# Patient Record
Sex: FEMALE | Race: WHITE | NOT HISPANIC OR LATINO | Employment: OTHER | URBAN - METROPOLITAN AREA
[De-identification: names, ages, dates, MRNs, and addresses within clinical notes are randomized per-mention and may not be internally consistent; named-entity substitution may affect disease eponyms.]

---

## 2017-06-15 ENCOUNTER — OFFICE VISIT (OUTPATIENT)
Dept: PODIATRY | Facility: CLINIC | Age: 82
End: 2017-06-15
Payer: COMMERCIAL

## 2017-06-15 VITALS
SYSTOLIC BLOOD PRESSURE: 138 MMHG | HEART RATE: 74 BPM | BODY MASS INDEX: 26.57 KG/M2 | WEIGHT: 150 LBS | DIASTOLIC BLOOD PRESSURE: 66 MMHG

## 2017-06-15 DIAGNOSIS — E11.51 TYPE II DIABETES MELLITUS WITH PERIPHERAL ARTERY DISEASE (H): Primary | ICD-10-CM

## 2017-06-15 DIAGNOSIS — B35.1 DERMATOPHYTOSIS OF NAIL: ICD-10-CM

## 2017-06-15 DIAGNOSIS — L84 CORNS AND CALLOSITIES: ICD-10-CM

## 2017-06-15 PROCEDURE — 11057 PARNG/CUTG B9 HYPRKR LES >4: CPT | Mod: Q8 | Performed by: PODIATRIST

## 2017-06-15 PROCEDURE — 11721 DEBRIDE NAIL 6 OR MORE: CPT | Mod: 59 | Performed by: PODIATRIST

## 2017-06-15 RX ORDER — MECLIZINE HYDROCHLORIDE 25 MG/1
25 TABLET ORAL
COMMUNITY
Start: 2017-01-09 | End: 2020-01-09

## 2017-06-15 RX ORDER — DIPHENOXYLATE HCL/ATROPINE 2.5-.025MG
TABLET ORAL
COMMUNITY
Start: 2017-01-09 | End: 2024-03-30

## 2017-06-15 RX ORDER — ATORVASTATIN CALCIUM 40 MG/1
TABLET, FILM COATED ORAL
COMMUNITY
Start: 2016-12-16 | End: 2020-01-09

## 2017-06-15 RX ORDER — ASPIRIN 81 MG/1
81 TABLET ORAL
COMMUNITY
Start: 2016-03-03 | End: 2020-01-09

## 2017-06-15 RX ORDER — GABAPENTIN 600 MG/1
TABLET ORAL
COMMUNITY
Start: 2017-03-16 | End: 2024-03-30

## 2017-06-15 RX ORDER — LEVOTHYROXINE SODIUM 100 UG/1
100 TABLET ORAL DAILY
COMMUNITY

## 2017-06-15 NOTE — PATIENT INSTRUCTIONS
We wish you continued good healing. If you have any questions or concerns, please do not hesitate to contact us at [phone]      Please remember to call and schedule a follow up appointment if one was recommended at your earliest convenience.   PODIATRY CLINIC HOURS  TELEPHONE NUMBER    Dr. Willie Plascencia D.P.M [facility]    Clinics:  [facility]        Tosha Leigh MA  Medical Assistant  Tuesday 1PM-6PM  [facility]  Wednesday 7AM-2PM  [place]  Thursday 10AM-6PM  [place] Friday 7AM-345PM  [place]  Specialty schedulers:   [phone] to make an appointment with any Specialty Provider.        Urgent Care locations:    [facility] Monday-Friday 5 pm - 9 pm. Saturday-Sunday 9 am -5pm    Monday-Friday 11 am - 9 pm Saturday 9 am - 5 pm     Monday-Sunday 12 noon-8PM [phone]     If you need a medication refill, please contact us you may need lab work and/or a follow up visit prior to your refill (i.e. Antifungal medications).    [employer] (secure e-mail communication and access to your chart) to send a message or to make an appointment.    If MRI needed please call [facility] at [phone]        Weight management plan: Patient was referred to their PCP to discuss a diet and exercise plan.

## 2017-06-15 NOTE — MR AVS SNAPSHOT
After Visit Summary   6/15/2017    Mariela Ortiz    MRN: [id number]           Patient Information     Date Of Birth          5/25/1933        Visit Information        Provider Department      6/15/2017 4:15 PM Willie Plascencia, DPM [facility]        Today's Diagnoses     Type II diabetes mellitus with peripheral artery disease (H)    -  1    Dermatophytosis of nail        Corns and callosities          Care Instructions    We wish you continued good healing. If you have any questions or concerns, please do not hesitate to contact us at [phone]      Please remember to call and schedule a follow up appointment if one was recommended at your earliest convenience.   PODIATRY CLINIC HOURS  TELEPHONE NUMBER    Dr. Willie Plascencia DJillianPCHETAN FAC FAS    Clinics:  [facility]        Tosha Leigh MA  Medical Assistant  Tuesday 1PM-6PM  [place]  Wednesday 7AM-2PM  [place]  Thursday 10AM-6PM  [place]  Friday 7AM-345PM  [place]  Specialty schedulers:   [phone] to make an appointment with any Specialty Provider.        Urgent Care locations:    [place] Monday-Friday 5 pm - 9 pm. Saturday-Sunday 9 am -5pm    Monday-Friday 11 am - 9 pm Saturday 9 am - 5 pm     Monday-Sunday 12 noon-8PM [phone]     If you need a medication refill, please contact us you may need lab work and/or a follow up visit prior to your refill (i.e. Antifungal medications).    [email or web address] (secure e-mail communication and access to your chart) to send a message or to make an appointment.    If MRI needed please call [facility] Re[facility] at [phone]        Weight management plan: Patient was referred to their PCP to discuss a diet and exercise plan.            Follow-ups after your visit        Who to contact     If you have questions or need follow up information about  "today's clinic visit or your schedule please contact [facility] directly at [phone].  Normal or non-critical lab and imaging results will be communicated to you by MyChart, letter or phone within 4 business days after the clinic has received the results. If you do not hear from us within 7 days, please contact the clinic through [employer] or phone. If you have a critical or abnormal lab result, we will notify you by phone as soon as possible.  Submit refill requests through [employer] or call your pharmacy and they will forward the refill request to us. Please allow 3 business days for your refill to be completed.          Additional Information About Your Visit        [employer] Information     [employer] lets you send messages to your doctor, view your test results, renew your prescriptions, schedule appointments and more. To sign up, go to [email or web address] . Click on \"Log in\" on the left side of the screen, which will take you to the Welcome page. Then click on \"Sign up Now\" on the right side of the page.     You will be asked to enter the access code listed below, as well as some personal information. Please follow the directions to create your username and password.     Your access code is: [email or web address]  Expires: 2017  4:42 PM     Your access code will  in 90 days. If you need help or a new code, please call your [place] clinic or [phone].        Care EveryWhere ID     This is your Care EveryWhere ID. This could be used by other organizations to access your [place] medical records  [id number]        Your Vitals Were     Pulse BMI (Body Mass Index)                74 26.57 kg/m2           Blood Pressure from Last 3 Encounters:   06/15/17 138/66   12/14/15 142/64   03/11/15 134/70    Weight from Last 3 Encounters:   06/15/17 68 kg (150 lb)   12/14/15 71.2 kg (157 lb)   04/01/15 73 kg (161 lb)              We Performed the Following     DEBRIDEMENT OF NAILS, 6 OR MORE     TRIM " HYPERKERATOTIC SKIN LESION,>4          Today's Medication Changes          These changes are accurate as of: 6/15/17  4:42 PM.  If you have any questions, ask your nurse or doctor.               These medicines have changed or have updated prescriptions.        Dose/Directions    aspirin EC 81 MG EC tablet   This may have changed:  Another medication with the same name was removed. Continue taking this medication, and follow the directions you see here.   Changed by:  Willie Plascencia DPM        Dose:  81 mg   Take 81 mg by mouth   Refills:  0                Primary Care Provider Office Phone # Fax #    Tiffany Prater [phone]       [address] [address]        Thank you!     Thank you for choosing [facility]  for your care. Our goal is always to provide you with excellent care. Hearing back from our patients is one way we can continue to improve our services. Please take a few minutes to complete the written survey that you may receive in the mail after your visit with us. Thank you!             Your Updated Medication List - Protect others around you: Learn how to safely use, store and throw away your medicines at www.disposemymeds.org.          This list is accurate as of: 6/15/17  4:42 PM.  Always use your most recent med list.                   Brand Name Dispense Instructions for use    ACE NOT PRESCRIBED (INTENTIONAL)     0 each    1 each continuous prn. ACE Inhibitor not prescribed due to Other:not needed right now       acetaminophen 500 MG tablet    TYLENOL     Take 500-1,000 mg by mouth Take 2 tablets as needed for pain       amoxicillin 500 MG capsule    AMOXIL     Take 500 mg by mouth 4 tablets 1 hour before dental.       aspirin EC 81 MG EC tablet      Take 81 mg by mouth       atorvastatin 40 MG tablet    LIPITOR     TAKE ONE TABLET BY MOUTH EVERY DAY       CALCIUM 600 + D PO      Take 2 tablets by mouth daily.        D-400 400 UNITS Tabs      Once daily.       diphenoxylate-atropine 2.5-0.025 MG per tablet    LOMOTIL         gabapentin 600 MG tablet    NEURONTIN     TAKE 1 TABLET BY MOUTH THREE TIMES A DAY       levothyroxine 150 MCG tablet    SYNTHROID/LEVOTHROID     TAKE ONE TABLET BY MOUTH EVERY DAY BEFORE MORNING MEAL       meclizine 25 MG tablet    ANTIVERT     Take 25 mg by mouth       pantoprazole sodium 40 MG tablet      Take 1 packet by mouth daily

## 2017-06-15 NOTE — PROGRESS NOTES
Subjective:    Pt is seen today  for a diabetic foot exam.  Patient is having a hard time trimming his nails and points to all of them.  This has been getting more difficult over the years.  Sometimes they are painful.  The pain is aggravated by activity and relieved by rest.  Patient also has calluses which are painful.  Describes it as a burning pain which is aggravated by activity and relieved by rest.  Primary care is Ana Lilia Hurt last seen 2/2/17.      ROS:  No hx of wound healing problems, denies numbness, erythema, or fever and chills.  Denies foot ulcers.     No Known Allergies    Current Outpatient Prescriptions   Medication Sig Dispense Refill     gabapentin (NEURONTIN) 600 MG tablet Take 1 tablet (600 mg) by mouth 3 times daily 270 tablet 3     atorvastatin (LIPITOR) 40 MG tablet Take 1 tablet (40 mg) by mouth daily 90 tablet 0     pantoprazole sodium 40 MG tablet Take 1 packet by mouth daily       acetaminophen (TYLENOL) 500 MG tablet Take 500-1,000 mg by mouth Take 2 tablets as needed for pain       levothyroxine (LEVOTHROID) 150 MCG tablet Take 1 tablet (150 mcg) by mouth daily 90 tablet 3     ACE NOT PRESCRIBED, INTENTIONAL, 1 each continuous prn. ACE Inhibitor not prescribed due to Other:not needed right now 0 each 0     Cholecalciferol (D-400) 400 UNITS TABS Once daily.        amoxicillin (AMOXIL) 500 MG capsule Take 500 mg by mouth 4 tablets 1 hour before dental.       aspirin 325 MG tablet Take 325 mg by mouth daily.       Calcium Carbonate-Vitamin D (CALCIUM 600 + D OR) Take 2 tablets by mouth daily.         Patient Active Problem List   Diagnosis     Advanced directives, counseling/discussion     GERD (gastroesophageal reflux disease)     Chronic lymphocytic leukemia (H)     Spinal stenosis     Type 2 diabetes mellitus without complication (H)     Hyperlipidemia LDL goal <100     Right knee pain     Low back pain     AC (acromioclavicular) arthritis     Arthritis of shoulder     Hypothyroidism  due to acquired atrophy of thyroid       Past Medical History:   Diagnosis Date     Melanoma (H)        Past Surgical History:   Procedure Laterality Date     APPENDECTOMY       CHOLECYSTECTOMY, LAPOROSCOPIC      Cholecystectomy, Laparoscopic     HYSTERECTOMY, PAP NO LONGER INDICATED  1981    menorrhagia     JOINT REPLACEMENT, HIP RT/LT  fall 2002    right     JOINT REPLACEMTN, KNEE RT/LT  2002    Joint Replacement knee RT/LT       Family History   Problem Relation Age of Onset     DIABETES Daughter        Social History   Substance Use Topics     Smoking status: Former Smoker     Start date: 4/1/2002     Smokeless tobacco: Never Used     Alcohol use Yes      Comment: 2 beers a week         Exam:    There were no vitals taken for this visit..  Patient a good historian.  A&O X 3.  Pulses DP 1/4, PT 0/4 b/l.  CRT < 3 seconds X 10 digits.  Bilateral edema or varicosities noted.  5.07 monofilament  intact b/l.  Reflexes 2/4 b/l.  Skin thin, shiny, and atrophic with no hair growth noted b/l.  Normal arch with weightbearing.  No forefoot or rear foot deformities noted.  MS 5/5 all compartments.  Normal ROM all fore foot and rearfoot joints.  All nails are thickened, elongated, discolored with subungual debris.  Patient has calluses on right foot sub fifth met head and right  plantar toes 1,2,4 and left hallus plantar.  No masses or breakdown in the skin bilaterally.  No erythema or ecchymosis noted bilateral.     A/P  Diabetes mellitus with PAD  Onychomycosis  Calluses x 5    Mycotic nails manually debrided with a .  Calluses debrided with a fifteen blade.    Gave patient instructions on daily foot examination and wearing good shoes at all times. Will RETURN TO CLINIC ASAP if notices any problems.  Discussed with patient that I do not do this in my practice unless patient at significant risk of limb loss.  Will refer to foot care service/nurse.          Willie Plascencia DPM, FACFAS

## 2017-06-15 NOTE — LETTER
6/15/2017       RE: Mariela Ortiz  [address]  [facility] [address]           Dear Colleague,    Thank you for referring your patient, Mariela Ortiz, to the [facility]. Please see a copy of my visit note below.    Subjective:    Pt is seen today  for a diabetic foot exam.  Patient is having a hard time trimming his nails and points to all of them.  This has been getting more difficult over the years.  Sometimes they are painful.  The pain is aggravated by activity and relieved by rest.  Patient also has calluses which are painful.  Describes it as a burning pain which is aggravated by activity and relieved by rest.  Primary care is Ana Lilia Hurt last seen 2/2/17.      ROS:  No hx of wound healing problems, denies numbness, erythema, or fever and chills.  Denies foot ulcers.     No Known Allergies    Current Outpatient Prescriptions   Medication Sig Dispense Refill     gabapentin (NEURONTIN) 600 MG tablet Take 1 tablet (600 mg) by mouth 3 times daily 270 tablet 3     atorvastatin (LIPITOR) 40 MG tablet Take 1 tablet (40 mg) by mouth daily 90 tablet 0     pantoprazole sodium 40 MG tablet Take 1 packet by mouth daily       acetaminophen (TYLENOL) 500 MG tablet Take 500-1,000 mg by mouth Take 2 tablets as needed for pain       levothyroxine (LEVOTHROID) 150 MCG tablet Take 1 tablet (150 mcg) by mouth daily 90 tablet 3     ACE NOT PRESCRIBED, INTENTIONAL, 1 each continuous prn. ACE Inhibitor not prescribed due to Other:not needed right now 0 each 0     Cholecalciferol (D-400) 400 UNITS TABS Once daily.        amoxicillin (AMOXIL) 500 MG capsule Take 500 mg by mouth 4 tablets 1 hour before dental.       aspirin 325 MG tablet Take 325 mg by mouth daily.       Calcium Carbonate-Vitamin D (CALCIUM 600 + D OR) Take 2 tablets by mouth daily.         Patient Active Problem List   Diagnosis     Advanced directives, counseling/discussion     GERD (gastroesophageal reflux disease)     Chronic  lymphocytic leukemia (H)     Spinal stenosis     Type 2 diabetes mellitus without complication (H)     Hyperlipidemia LDL goal <100     Right knee pain     Low back pain     AC (acromioclavicular) arthritis     Arthritis of shoulder     Hypothyroidism due to acquired atrophy of thyroid       Past Medical History:   Diagnosis Date     Melanoma (H)        Past Surgical History:   Procedure Laterality Date     APPENDECTOMY       CHOLECYSTECTOMY, LAPOROSCOPIC      Cholecystectomy, Laparoscopic     HYSTERECTOMY, PAP NO LONGER INDICATED  1981    menorrhagia     JOINT REPLACEMENT, HIP RT/LT  fall 2002    right     JOINT REPLACEMTN, KNEE RT/LT  2002    Joint Replacement knee RT/LT       Family History   Problem Relation Age of Onset     DIABETES Daughter        Social History   Substance Use Topics     Smoking status: Former Smoker     Start date: 4/1/2002     Smokeless tobacco: Never Used     Alcohol use Yes      Comment: 2 beers a week         Exam:    There were no vitals taken for this visit..  Patient a good historian.  A&O X 3.  Pulses DP 1/4, PT 0/4 b/l.  CRT < 3 seconds X 10 digits.  Bilateral edema or varicosities noted.  5.07 monofilament  intact b/l.  Reflexes 2/4 b/l.  Skin thin, shiny, and atrophic with no hair growth noted b/l.  Normal arch with weightbearing.  No forefoot or rear foot deformities noted.  MS 5/5 all compartments.  Normal ROM all fore foot and rearfoot joints.  All nails are thickened, elongated, discolored with subungual debris.  Patient has calluses on right foot sub fifth met head and right  plantar toes 1,2,4 and left hallus plantar.  No masses or breakdown in the skin bilaterally.  No erythema or ecchymosis noted bilateral.     A/P  Diabetes mellitus with PAD  Onychomycosis  Calluses x 5    Mycotic nails manually debrided with a .  Calluses debrided with a fifteen blade.    Gave patient instructions on daily foot examination and wearing good shoes at all times. Will RETURN TO  CLINIC ASAP if notices any problems.  Discussed with patient that I do not do this in my practice unless patient at significant risk of limb loss.  Will refer to foot care service/nurse.          Willie Plascencia DPM, FACFAS           Again, thank you for allowing me to participate in the care of your patient.        Sincerely,              Willie Plascencia DPM

## 2020-01-09 ENCOUNTER — OFFICE VISIT (OUTPATIENT)
Dept: PODIATRY | Facility: CLINIC | Age: 85
End: 2020-01-09
Payer: COMMERCIAL

## 2020-01-09 VITALS
HEART RATE: 70 BPM | WEIGHT: 134 LBS | DIASTOLIC BLOOD PRESSURE: 64 MMHG | BODY MASS INDEX: 23.74 KG/M2 | SYSTOLIC BLOOD PRESSURE: 146 MMHG

## 2020-01-09 DIAGNOSIS — E11.51 TYPE II DIABETES MELLITUS WITH PERIPHERAL ARTERY DISEASE (H): Primary | ICD-10-CM

## 2020-01-09 DIAGNOSIS — B35.1 ONYCHOMYCOSIS: ICD-10-CM

## 2020-01-09 DIAGNOSIS — L84 CALLUS: ICD-10-CM

## 2020-01-09 PROBLEM — F03.90 DEMENTIA (H): Status: ACTIVE | Noted: 2017-07-21

## 2020-01-09 PROBLEM — R42 DISEQUILIBRIUM: Status: ACTIVE | Noted: 2019-03-09

## 2020-01-09 PROBLEM — Z78.9 ALCOHOL USE: Status: ACTIVE | Noted: 2018-04-03

## 2020-01-09 PROBLEM — N99.3 VAGINAL VAULT PROLAPSE AFTER HYSTERECTOMY: Status: ACTIVE | Noted: 2019-02-19

## 2020-01-09 PROCEDURE — 11056 PARNG/CUTG B9 HYPRKR LES 2-4: CPT | Mod: Q8 | Performed by: PODIATRIST

## 2020-01-09 PROCEDURE — 11720 DEBRIDE NAIL 1-5: CPT | Mod: 59 | Performed by: PODIATRIST

## 2020-01-09 PROCEDURE — 99213 OFFICE O/P EST LOW 20 MIN: CPT | Mod: 25 | Performed by: PODIATRIST

## 2020-01-09 NOTE — LETTER
1/9/2020         RE: Mariela Ortiz  [address]  [facility] [address]        Dear Colleague,    Thank you for referring your patient, Mariela Ortiz, to the [facility]. Please see a copy of my visit note below.    Subjective:    Pt is seen today as a new pt for a diabetic foot exam.  Patient is having a hard time trimming his nails and points right hallux.  This has been getting more difficult over the years.   Patient also has calluses which are painful.  Describes it as a burning pain which is aggravated by activity and relieved by rest.  Patient's most painful callus is under her right fifth metatarsal head.  Primary care is Dr. Maki last seen 10/2/19.      ROS:  A 10-point review of systems was performed and is positive for that noted in the HPI and as seen above.  All other areas are negative.          Allergies   Allergen Reactions     Morphine Rash and Unknown     Red line went up her arm when given  Other reaction(s): Erythema  Red line went up her arm when given       Oxycodone-Acetaminophen Rash       Current Outpatient Medications   Medication Sig Dispense Refill     ACE NOT PRESCRIBED, INTENTIONAL, 1 each continuous prn. ACE Inhibitor not prescribed due to Other:not needed right now 0 each 0     acetaminophen (TYLENOL) 500 MG tablet Take 500-1,000 mg by mouth Take 2 tablets as needed for pain       Calcium Carbonate-Vitamin D (CALCIUM 600 + D OR) Take 2 tablets by mouth daily.       Cholecalciferol (D-400) 400 UNITS TABS Once daily.        diphenoxylate-atropine (LOMOTIL) 2.5-0.025 MG per tablet        gabapentin (NEURONTIN) 600 MG tablet TAKE 1 TABLET BY MOUTH THREE TIMES A DAY       levothyroxine (SYNTHROID/LEVOTHROID) 150 MCG tablet TAKE ONE TABLET BY MOUTH EVERY DAY BEFORE MORNING MEAL       pantoprazole sodium 40 MG tablet Take 1 packet by mouth daily       amoxicillin (AMOXIL) 500 MG capsule Take 500 mg by mouth 4 tablets 1 hour before dental.         Patient  Active Problem List   Diagnosis     Advanced directives, counseling/discussion     GERD (gastroesophageal reflux disease)     Chronic lymphocytic leukemia (H)     Spinal stenosis     Type 2 diabetes mellitus without complication (H)     Hyperlipidemia LDL goal <100     Right knee pain     Low back pain     AC (acromioclavicular) arthritis     Arthritis of shoulder     Hypothyroidism due to acquired atrophy of thyroid     Alcohol use     Closed fracture of part of neck of femur (H)     Dementia (H)     Disequilibrium     Dyspepsia     Fecal urgency     Melanoma of skin (H)     Osteoarthritis of knees, bilateral     Cystocele     Sensorineural hearing loss     Vaginal vault prolapse after hysterectomy       Past Medical History:   Diagnosis Date     Melanoma (H)        Past Surgical History:   Procedure Laterality Date     APPENDECTOMY       CHOLECYSTECTOMY, LAPOROSCOPIC      Cholecystectomy, Laparoscopic     HYSTERECTOMY, PAP NO LONGER INDICATED  1981    menorrhagia     JOINT REPLACEMENT, HIP RT/LT  fall 2002    right     JOINT REPLACEMTN, KNEE RT/LT  2002    Joint Replacement knee RT/LT       Family History   Problem Relation Age of Onset     Diabetes Daughter        Social History     Tobacco Use     Smoking status: Former Smoker     Start date: 4/1/2002     Smokeless tobacco: Never Used   Substance Use Topics     Alcohol use: Yes     Comment: 2 beers a week         Exam:    Vitals: BP (!) 146/64 (BP Location: Left arm)   Pulse 70   Wt 60.8 kg (134 lb)   BMI 23.74 kg/m     BMI: Body mass index is 23.74 kg/m .  Height: Data Unavailable    Constitutional/ general:  Pt is in no apparent distress, appears well-nourished.  Cooperative with history and physical exam.  Patient seen with daughter today    Psych:  The patient answered questions appropriately.  Normal affect.  Seems to have reasonable expectations, in terms of treatment.     Eyes:  Visual scanning/ tracking without deficit.     Ears:  Response to auditory  stimuli is normal.   Auricles in proper alignment.     Lymphatic:  Popliteal lymph nodes not enlarged.     Lungs:  Non labored breathing, non labored speech. No cough.  No audible wheezing. Even, quiet breathing.       Vascular:  positive  DP pulse.  negative PT pulse.  CFT < 3 sec.  positive ankle edema.  positive varicosities.  negative pedal hair growth.    Neuro:  Alert and oriented x 3. Coordinated gait.  Light touch sensation is intact to the L4, L5, S1 distributions. No obvious deficits.  No evidence of neurological-based weakness, spasticity, or contracture in the lower extremities.  Monofilament intact on all digits    Derm:  Skin thin, shiny, atrophic, with no hair growth noted.   No erythema, ecchymosis, or cyanosis.  No foot ulcers.    Right hallux nail thickened, elongated, discolored with subungual debris.  Patient has calluses on right foot sub-fifth metatarsal heads and on fourth and first toes.  No masses or breakdown in the skin bilaterally.  No erythema or ecchymosis noted bilateral.     Musculoskeletal:    Lower extremity muscle strength is normal.  Patient is ambulatory without an assistive device or brace.  No gross deformities.  Normal arch.        A/P  Diabetes mellitus with PAD  Onychomycosis  Calluses x 1    Mycotic nail manually debrided with a .  Calluses debrided with a fifteen blade.    Gave patient instructions on daily foot examination and wearing good shoes at all times. Discussed with patient  that I do not do routine foot care in my practice unless patient at significant risk of limb loss.  Will refer to foot care service/nurse.  Return to clinic prn.            Willie Plascencia DPM DPM, FACFAS           Again, thank you for allowing me to participate in the care of your patient.        Sincerely,        Willie Plascencia DPM

## 2020-01-09 NOTE — PATIENT INSTRUCTIONS
Weight management plan: Patient was referred to their PCP to discuss a diet and exercise plan.  We wish you continued good healing. If you have any questions or concerns, please do not hesitate to contact us at [phone]    Please remember to call and schedule a follow up appointment if one was recommended at your earliest convenience.   PODIATRY CLINIC HOURS  TELEPHONE NUMBER    Dr. Willie Plascencia D.P.M [facility]    Clinics:  [facility]    Tosha Leigh [facility]   Tuesday 1PM-6PM  [place]  Wednesday 7AM-2PM  [facility]  Thursday 10AM-6PM  [place]  Friday 7AM-3PM  [place]  Specialty schedulers:   [phone] to make an appointment with any Specialty Provider.        Urgent Care locations:    [facility] Monday-Friday 5 pm - 9 pm. Saturday-Sunday 9 am -5pm    Monday-Friday 11 am - 9 pm Saturday 9 am - 5 pm     Monday-Sunday 12 noon-8PM [phone]     If you need a medication refill, please contact us you may need lab work and/or a follow up visit prior to your refill (i.e. Antifungal medications).    [employer] (secure e-mail communication and access to your chart) to send a message or to make an appointment.    If MRI needed please call [facility] Reyes at [phone]        Solve to follow up with Primary Care provider regarding elevated blood pressure.

## 2020-01-09 NOTE — PROGRESS NOTES
Subjective:    Pt is seen today as a new pt for a diabetic foot exam.  Patient is having a hard time trimming his nails and points right hallux.  This has been getting more difficult over the years.   Patient also has calluses which are painful.  Describes it as a burning pain which is aggravated by activity and relieved by rest.  Patient's most painful callus is under her right fifth metatarsal head.  Primary care is Dr. Maki last seen 10/2/19.      ROS:  A 10-point review of systems was performed and is positive for that noted in the HPI and as seen above.  All other areas are negative.          Allergies   Allergen Reactions     Morphine Rash and Unknown     Red line went up her arm when given  Other reaction(s): Erythema  Red line went up her arm when given       Oxycodone-Acetaminophen Rash       Current Outpatient Medications   Medication Sig Dispense Refill     ACE NOT PRESCRIBED, INTENTIONAL, 1 each continuous prn. ACE Inhibitor not prescribed due to Other:not needed right now 0 each 0     acetaminophen (TYLENOL) 500 MG tablet Take 500-1,000 mg by mouth Take 2 tablets as needed for pain       Calcium Carbonate-Vitamin D (CALCIUM 600 + D OR) Take 2 tablets by mouth daily.       Cholecalciferol (D-400) 400 UNITS TABS Once daily.        diphenoxylate-atropine (LOMOTIL) 2.5-0.025 MG per tablet        gabapentin (NEURONTIN) 600 MG tablet TAKE 1 TABLET BY MOUTH THREE TIMES A DAY       levothyroxine (SYNTHROID/LEVOTHROID) 150 MCG tablet TAKE ONE TABLET BY MOUTH EVERY DAY BEFORE MORNING MEAL       pantoprazole sodium 40 MG tablet Take 1 packet by mouth daily       amoxicillin (AMOXIL) 500 MG capsule Take 500 mg by mouth 4 tablets 1 hour before dental.         Patient Active Problem List   Diagnosis     Advanced directives, counseling/discussion     GERD (gastroesophageal reflux disease)     Chronic lymphocytic leukemia (H)     Spinal stenosis     Type 2 diabetes mellitus without complication (H)      Hyperlipidemia LDL goal <100     Right knee pain     Low back pain     AC (acromioclavicular) arthritis     Arthritis of shoulder     Hypothyroidism due to acquired atrophy of thyroid     Alcohol use     Closed fracture of part of neck of femur (H)     Dementia (H)     Disequilibrium     Dyspepsia     Fecal urgency     Melanoma of skin (H)     Osteoarthritis of knees, bilateral     Cystocele     Sensorineural hearing loss     Vaginal vault prolapse after hysterectomy       Past Medical History:   Diagnosis Date     Melanoma (H)        Past Surgical History:   Procedure Laterality Date     APPENDECTOMY       CHOLECYSTECTOMY, LAPOROSCOPIC      Cholecystectomy, Laparoscopic     HYSTERECTOMY, PAP NO LONGER INDICATED  1981    menorrhagia     JOINT REPLACEMENT, HIP RT/LT  fall 2002    right     JOINT REPLACEMTN, KNEE RT/LT  2002    Joint Replacement knee RT/LT       Family History   Problem Relation Age of Onset     Diabetes Daughter        Social History     Tobacco Use     Smoking status: Former Smoker     Start date: 4/1/2002     Smokeless tobacco: Never Used   Substance Use Topics     Alcohol use: Yes     Comment: 2 beers a week         Exam:    Vitals: BP (!) 146/64 (BP Location: Left arm)   Pulse 70   Wt 60.8 kg (134 lb)   BMI 23.74 kg/m    BMI: Body mass index is 23.74 kg/m .  Height: Data Unavailable    Constitutional/ general:  Pt is in no apparent distress, appears well-nourished.  Cooperative with history and physical exam.  Patient seen with daughter today    Psych:  The patient answered questions appropriately.  Normal affect.  Seems to have reasonable expectations, in terms of treatment.     Eyes:  Visual scanning/ tracking without deficit.     Ears:  Response to auditory stimuli is normal.   Auricles in proper alignment.     Lymphatic:  Popliteal lymph nodes not enlarged.     Lungs:  Non labored breathing, non labored speech. No cough.  No audible wheezing. Even, quiet breathing.       Vascular:   positive  DP pulse.  negative PT pulse.  CFT < 3 sec.  positive ankle edema.  positive varicosities.  negative pedal hair growth.    Neuro:  Alert and oriented x 3. Coordinated gait.  Light touch sensation is intact to the L4, L5, S1 distributions. No obvious deficits.  No evidence of neurological-based weakness, spasticity, or contracture in the lower extremities.  Monofilament intact on all digits    Derm:  Skin thin, shiny, atrophic, with no hair growth noted.   No erythema, ecchymosis, or cyanosis.  No foot ulcers.    Right hallux nail thickened, elongated, discolored with subungual debris.  Patient has calluses on right foot sub-fifth metatarsal heads and on fourth and first toes.  No masses or breakdown in the skin bilaterally.  No erythema or ecchymosis noted bilateral.     Musculoskeletal:    Lower extremity muscle strength is normal.  Patient is ambulatory without an assistive device or brace.  No gross deformities.  Normal arch.        A/P  Diabetes mellitus with PAD  Onychomycosis  Calluses x 1    Mycotic nail manually debrided with a .  Calluses debrided with a fifteen blade.    Gave patient instructions on daily foot examination and wearing good shoes at all times. Discussed with patient  that I do not do routine foot care in my practice unless patient at significant risk of limb loss.  Will refer to foot care service/nurse.  Return to clinic prn.            Willie Plascencia DPM DPM, BOONE

## 2024-01-01 ENCOUNTER — APPOINTMENT (OUTPATIENT)
Dept: PHYSICAL THERAPY | Facility: HOSPITAL | Age: 89
DRG: 064 | End: 2024-01-01
Attending: HOSPITALIST
Payer: COMMERCIAL

## 2024-01-01 ENCOUNTER — APPOINTMENT (OUTPATIENT)
Dept: NEUROLOGY | Facility: HOSPITAL | Age: 89
DRG: 064 | End: 2024-01-01
Attending: PSYCHIATRY & NEUROLOGY
Payer: COMMERCIAL

## 2024-01-01 ENCOUNTER — APPOINTMENT (OUTPATIENT)
Dept: SPEECH THERAPY | Facility: HOSPITAL | Age: 89
DRG: 064 | End: 2024-01-01
Attending: HOSPITALIST
Payer: COMMERCIAL

## 2024-01-01 ENCOUNTER — APPOINTMENT (OUTPATIENT)
Dept: MRI IMAGING | Facility: HOSPITAL | Age: 89
DRG: 064 | End: 2024-01-01
Attending: PSYCHIATRY & NEUROLOGY
Payer: COMMERCIAL

## 2024-01-01 ENCOUNTER — APPOINTMENT (OUTPATIENT)
Dept: SPEECH THERAPY | Facility: HOSPITAL | Age: 89
DRG: 064 | End: 2024-01-01
Payer: COMMERCIAL

## 2024-01-01 ENCOUNTER — APPOINTMENT (OUTPATIENT)
Dept: MRI IMAGING | Facility: HOSPITAL | Age: 89
DRG: 064 | End: 2024-01-01
Attending: NURSE PRACTITIONER
Payer: COMMERCIAL

## 2024-01-01 ENCOUNTER — APPOINTMENT (OUTPATIENT)
Dept: CT IMAGING | Facility: HOSPITAL | Age: 89
DRG: 064 | End: 2024-01-01
Attending: EMERGENCY MEDICINE
Payer: COMMERCIAL

## 2024-01-01 ENCOUNTER — APPOINTMENT (OUTPATIENT)
Dept: CARDIOLOGY | Facility: HOSPITAL | Age: 89
DRG: 064 | End: 2024-01-01
Attending: PSYCHIATRY & NEUROLOGY
Payer: COMMERCIAL

## 2024-01-01 ENCOUNTER — HOSPITAL ENCOUNTER (INPATIENT)
Facility: HOSPITAL | Age: 89
LOS: 4 days | End: 2024-12-10
Attending: HOSPITALIST | Admitting: HOSPITALIST
Payer: COMMERCIAL

## 2024-01-01 ENCOUNTER — APPOINTMENT (OUTPATIENT)
Dept: RADIOLOGY | Facility: HOSPITAL | Age: 89
DRG: 064 | End: 2024-01-01
Attending: EMERGENCY MEDICINE
Payer: COMMERCIAL

## 2024-01-01 PROCEDURE — 250N000011 HC RX IP 250 OP 636: Performed by: HOSPITALIST

## 2024-01-01 PROCEDURE — 71045 X-RAY EXAM CHEST 1 VIEW: CPT

## 2024-01-01 PROCEDURE — 93306 TTE W/DOPPLER COMPLETE: CPT | Mod: 26 | Performed by: INTERNAL MEDICINE

## 2024-01-01 PROCEDURE — 110N000005 HC R&B HOSPICE, ACCENT

## 2024-01-01 PROCEDURE — 95819 EEG AWAKE AND ASLEEP: CPT

## 2024-01-01 PROCEDURE — 99232 SBSQ HOSP IP/OBS MODERATE 35: CPT | Performed by: HOSPITALIST

## 2024-01-01 PROCEDURE — 70544 MR ANGIOGRAPHY HEAD W/O DYE: CPT

## 2024-01-01 PROCEDURE — 250N000013 HC RX MED GY IP 250 OP 250 PS 637: Performed by: HOSPITALIST

## 2024-01-01 PROCEDURE — 70551 MRI BRAIN STEM W/O DYE: CPT

## 2024-01-01 PROCEDURE — 93306 TTE W/DOPPLER COMPLETE: CPT

## 2024-01-01 PROCEDURE — 95816 EEG AWAKE AND DROWSY: CPT | Mod: 26 | Performed by: PSYCHIATRY & NEUROLOGY

## 2024-01-01 PROCEDURE — 70496 CT ANGIOGRAPHY HEAD: CPT

## 2024-01-01 RX ORDER — ONDANSETRON 2 MG/ML
4 INJECTION INTRAMUSCULAR; INTRAVENOUS EVERY 6 HOURS PRN
Status: DISCONTINUED | OUTPATIENT
Start: 2024-01-01 | End: 2024-01-01 | Stop reason: HOSPADM

## 2024-01-01 RX ORDER — ONDANSETRON 4 MG/1
4 TABLET, ORALLY DISINTEGRATING ORAL EVERY 6 HOURS PRN
Status: DISCONTINUED | OUTPATIENT
Start: 2024-01-01 | End: 2024-01-01 | Stop reason: HOSPADM

## 2024-01-01 RX ORDER — HYDROMORPHONE HYDROCHLORIDE 1 MG/ML
0.5 INJECTION, SOLUTION INTRAMUSCULAR; INTRAVENOUS; SUBCUTANEOUS EVERY 4 HOURS
Status: DISCONTINUED | OUTPATIENT
Start: 2024-01-01 | End: 2024-01-01 | Stop reason: HOSPADM

## 2024-01-01 RX ORDER — NALOXONE HYDROCHLORIDE 0.4 MG/ML
0.2 INJECTION, SOLUTION INTRAMUSCULAR; INTRAVENOUS; SUBCUTANEOUS
Status: DISCONTINUED | OUTPATIENT
Start: 2024-01-01 | End: 2024-01-01 | Stop reason: HOSPADM

## 2024-01-01 RX ORDER — HYDROMORPHONE HYDROCHLORIDE 1 MG/ML
0.5 INJECTION, SOLUTION INTRAMUSCULAR; INTRAVENOUS; SUBCUTANEOUS
Status: DISCONTINUED | OUTPATIENT
Start: 2024-01-01 | End: 2024-01-01 | Stop reason: HOSPADM

## 2024-01-01 RX ORDER — MINERAL OIL/HYDROPHIL PETROLAT
OINTMENT (GRAM) TOPICAL
Status: DISCONTINUED | OUTPATIENT
Start: 2024-01-01 | End: 2024-01-01 | Stop reason: HOSPADM

## 2024-01-01 RX ORDER — OLANZAPINE 5 MG/1
5 TABLET, ORALLY DISINTEGRATING ORAL DAILY PRN
Status: DISCONTINUED | OUTPATIENT
Start: 2024-01-01 | End: 2024-01-01 | Stop reason: HOSPADM

## 2024-01-01 RX ORDER — LORAZEPAM 2 MG/ML
0.5 INJECTION INTRAMUSCULAR EVERY 6 HOURS
Status: DISCONTINUED | OUTPATIENT
Start: 2024-01-01 | End: 2024-01-01 | Stop reason: HOSPADM

## 2024-01-01 RX ORDER — NALOXONE HYDROCHLORIDE 0.4 MG/ML
0.4 INJECTION, SOLUTION INTRAMUSCULAR; INTRAVENOUS; SUBCUTANEOUS
Status: DISCONTINUED | OUTPATIENT
Start: 2024-01-01 | End: 2024-01-01 | Stop reason: HOSPADM

## 2024-01-01 RX ORDER — BISACODYL 10 MG
10 SUPPOSITORY, RECTAL RECTAL
Status: DISCONTINUED | OUTPATIENT
Start: 2024-01-01 | End: 2024-01-01 | Stop reason: HOSPADM

## 2024-01-01 RX ORDER — HYDROMORPHONE HYDROCHLORIDE 1 MG/ML
1 SOLUTION ORAL
Status: DISCONTINUED | OUTPATIENT
Start: 2024-01-01 | End: 2024-01-01 | Stop reason: HOSPADM

## 2024-01-01 RX ORDER — GLYCOPYRROLATE 0.2 MG/ML
0.2 INJECTION, SOLUTION INTRAMUSCULAR; INTRAVENOUS EVERY 4 HOURS PRN
Status: DISCONTINUED | OUTPATIENT
Start: 2024-01-01 | End: 2024-01-01 | Stop reason: HOSPADM

## 2024-01-01 RX ORDER — ACETAMINOPHEN 650 MG/1
650 SUPPOSITORY RECTAL EVERY 4 HOURS PRN
Status: DISCONTINUED | OUTPATIENT
Start: 2024-01-01 | End: 2024-01-01 | Stop reason: HOSPADM

## 2024-01-01 RX ORDER — CARBOXYMETHYLCELLULOSE SODIUM 5 MG/ML
1-2 SOLUTION/ DROPS OPHTHALMIC
Status: DISCONTINUED | OUTPATIENT
Start: 2024-01-01 | End: 2024-01-01 | Stop reason: HOSPADM

## 2024-01-01 RX ORDER — LORAZEPAM 2 MG/ML
0.5 INJECTION INTRAMUSCULAR EVERY 6 HOURS PRN
Status: DISCONTINUED | OUTPATIENT
Start: 2024-01-01 | End: 2024-01-01 | Stop reason: HOSPADM

## 2024-01-01 RX ADMIN — LORAZEPAM 0.5 MG: 2 INJECTION INTRAMUSCULAR; INTRAVENOUS at 22:09

## 2024-01-01 RX ADMIN — GLYCOPYRROLATE 0.2 MG: 0.2 INJECTION, SOLUTION INTRAMUSCULAR; INTRAVENOUS at 13:14

## 2024-01-01 RX ADMIN — HYDROMORPHONE HYDROCHLORIDE 0.5 MG: 1 INJECTION, SOLUTION INTRAMUSCULAR; INTRAVENOUS; SUBCUTANEOUS at 19:59

## 2024-01-01 RX ADMIN — HYDROMORPHONE HYDROCHLORIDE 0.5 MG: 1 INJECTION, SOLUTION INTRAMUSCULAR; INTRAVENOUS; SUBCUTANEOUS at 12:20

## 2024-01-01 RX ADMIN — LORAZEPAM 0.5 MG: 2 INJECTION INTRAMUSCULAR; INTRAVENOUS at 22:02

## 2024-01-01 RX ADMIN — LORAZEPAM 0.5 MG: 2 INJECTION INTRAMUSCULAR; INTRAVENOUS at 16:19

## 2024-01-01 RX ADMIN — LORAZEPAM 0.5 MG: 2 INJECTION INTRAMUSCULAR; INTRAVENOUS at 16:40

## 2024-01-01 RX ADMIN — HYDROMORPHONE HYDROCHLORIDE 0.5 MG: 1 INJECTION, SOLUTION INTRAMUSCULAR; INTRAVENOUS; SUBCUTANEOUS at 08:15

## 2024-01-01 RX ADMIN — HYDROMORPHONE HYDROCHLORIDE 0.5 MG: 1 INJECTION, SOLUTION INTRAMUSCULAR; INTRAVENOUS; SUBCUTANEOUS at 00:18

## 2024-01-01 RX ADMIN — HYDROMORPHONE HYDROCHLORIDE 0.5 MG: 1 INJECTION, SOLUTION INTRAMUSCULAR; INTRAVENOUS; SUBCUTANEOUS at 12:44

## 2024-01-01 RX ADMIN — HYDROMORPHONE HYDROCHLORIDE 0.5 MG: 1 INJECTION, SOLUTION INTRAMUSCULAR; INTRAVENOUS; SUBCUTANEOUS at 20:21

## 2024-01-01 RX ADMIN — LORAZEPAM 0.5 MG: 2 INJECTION INTRAMUSCULAR; INTRAVENOUS at 10:20

## 2024-01-01 RX ADMIN — HYDROMORPHONE HYDROCHLORIDE 0.5 MG: 1 INJECTION, SOLUTION INTRAMUSCULAR; INTRAVENOUS; SUBCUTANEOUS at 01:07

## 2024-01-01 RX ADMIN — LORAZEPAM 0.5 MG: 2 INJECTION INTRAMUSCULAR; INTRAVENOUS at 21:14

## 2024-01-01 RX ADMIN — HYDROMORPHONE HYDROCHLORIDE 0.5 MG: 1 INJECTION, SOLUTION INTRAMUSCULAR; INTRAVENOUS; SUBCUTANEOUS at 16:41

## 2024-01-01 RX ADMIN — LORAZEPAM 0.5 MG: 2 INJECTION INTRAMUSCULAR; INTRAVENOUS at 10:34

## 2024-01-01 RX ADMIN — LORAZEPAM 0.5 MG: 2 INJECTION INTRAMUSCULAR; INTRAVENOUS at 10:31

## 2024-01-01 RX ADMIN — HYDROMORPHONE HYDROCHLORIDE 0.5 MG: 1 INJECTION, SOLUTION INTRAMUSCULAR; INTRAVENOUS; SUBCUTANEOUS at 04:31

## 2024-01-01 RX ADMIN — HYDROMORPHONE HYDROCHLORIDE 0.5 MG: 1 INJECTION, SOLUTION INTRAMUSCULAR; INTRAVENOUS; SUBCUTANEOUS at 08:41

## 2024-01-01 RX ADMIN — HYDROMORPHONE HYDROCHLORIDE 0.5 MG: 1 INJECTION, SOLUTION INTRAMUSCULAR; INTRAVENOUS; SUBCUTANEOUS at 16:14

## 2024-01-01 RX ADMIN — LORAZEPAM 0.5 MG: 2 INJECTION INTRAMUSCULAR; INTRAVENOUS at 21:21

## 2024-01-01 RX ADMIN — LORAZEPAM 0.5 MG: 2 INJECTION INTRAMUSCULAR; INTRAVENOUS at 04:30

## 2024-01-01 RX ADMIN — HYDROMORPHONE HYDROCHLORIDE 0.5 MG: 1 INJECTION, SOLUTION INTRAMUSCULAR; INTRAVENOUS; SUBCUTANEOUS at 16:24

## 2024-01-01 RX ADMIN — HYDROMORPHONE HYDROCHLORIDE 0.5 MG: 1 INJECTION, SOLUTION INTRAMUSCULAR; INTRAVENOUS; SUBCUTANEOUS at 23:25

## 2024-01-01 RX ADMIN — GLYCOPYRROLATE 0.2 MG: 0.2 INJECTION, SOLUTION INTRAMUSCULAR; INTRAVENOUS at 16:39

## 2024-01-01 RX ADMIN — HYDROMORPHONE HYDROCHLORIDE 0.5 MG: 1 INJECTION, SOLUTION INTRAMUSCULAR; INTRAVENOUS; SUBCUTANEOUS at 20:45

## 2024-01-01 RX ADMIN — HYDROMORPHONE HYDROCHLORIDE 0.5 MG: 1 INJECTION, SOLUTION INTRAMUSCULAR; INTRAVENOUS; SUBCUTANEOUS at 12:31

## 2024-01-01 RX ADMIN — HYDROMORPHONE HYDROCHLORIDE 0.5 MG: 1 INJECTION, SOLUTION INTRAMUSCULAR; INTRAVENOUS; SUBCUTANEOUS at 08:31

## 2024-01-01 RX ADMIN — LORAZEPAM 0.5 MG: 2 INJECTION INTRAMUSCULAR; INTRAVENOUS at 04:28

## 2024-01-01 RX ADMIN — HYDROMORPHONE HYDROCHLORIDE 0.5 MG: 1 INJECTION, SOLUTION INTRAMUSCULAR; INTRAVENOUS; SUBCUTANEOUS at 23:53

## 2024-01-01 RX ADMIN — LORAZEPAM 0.5 MG: 2 INJECTION INTRAMUSCULAR; INTRAVENOUS at 16:24

## 2024-01-01 RX ADMIN — LORAZEPAM 0.5 MG: 2 INJECTION INTRAMUSCULAR; INTRAVENOUS at 18:59

## 2024-01-01 RX ADMIN — HYDROMORPHONE HYDROCHLORIDE 0.5 MG: 0.5 INJECTION, SOLUTION INTRAMUSCULAR; INTRAVENOUS; SUBCUTANEOUS at 10:34

## 2024-01-01 RX ADMIN — LORAZEPAM 0.5 MG: 2 INJECTION INTRAMUSCULAR; INTRAVENOUS at 05:28

## 2024-01-01 RX ADMIN — HYDROMORPHONE HYDROCHLORIDE 0.5 MG: 1 INJECTION, SOLUTION INTRAMUSCULAR; INTRAVENOUS; SUBCUTANEOUS at 05:28

## 2024-01-01 RX ADMIN — HYDROMORPHONE HYDROCHLORIDE 0.5 MG: 1 INJECTION, SOLUTION INTRAMUSCULAR; INTRAVENOUS; SUBCUTANEOUS at 19:00

## 2024-01-01 RX ADMIN — HYDROMORPHONE HYDROCHLORIDE 0.5 MG: 1 INJECTION, SOLUTION INTRAMUSCULAR; INTRAVENOUS; SUBCUTANEOUS at 04:28

## 2024-01-01 ASSESSMENT — ACTIVITIES OF DAILY LIVING (ADL)
ADLS_ACUITY_SCORE: 81
ADLS_ACUITY_SCORE: 66
ADLS_ACUITY_SCORE: 81
ADLS_ACUITY_SCORE: 89
ADLS_ACUITY_SCORE: 81
ADLS_ACUITY_SCORE: 81
PATIENT'S_PREFERRED_MEANS_OF_COMMUNICATION: ENGLISH SPEAKER WITH HEARING LOSS, NO SPEECH PROBLEMS.
ADLS_ACUITY_SCORE: 89
ADLS_ACUITY_SCORE: 80
ADLS_ACUITY_SCORE: 89
ADLS_ACUITY_SCORE: 90
ADLS_ACUITY_SCORE: 66
ADLS_ACUITY_SCORE: 89
ADLS_ACUITY_SCORE: 81
ADLS_ACUITY_SCORE: 81
ADLS_ACUITY_SCORE: 90
ADLS_ACUITY_SCORE: 89
ADLS_ACUITY_SCORE: 81
ADLS_ACUITY_SCORE: 81
ADLS_ACUITY_SCORE: 89
ADLS_ACUITY_SCORE: 90
ADLS_ACUITY_SCORE: 66
ADLS_ACUITY_SCORE: 89
ADLS_ACUITY_SCORE: 89
HEARING_DIFFICULTY_OR_DEAF: YES
USE_OF_HEARING_ASSISTIVE_DEVICES: RIGHT HEARING AID
ADLS_ACUITY_SCORE: 84
ADLS_ACUITY_SCORE: 90
ADLS_ACUITY_SCORE: 81
ADLS_ACUITY_SCORE: 89
ADLS_ACUITY_SCORE: 84
ADLS_ACUITY_SCORE: 81
ADLS_ACUITY_SCORE: 66
ADLS_ACUITY_SCORE: 81
ADLS_ACUITY_SCORE: 86
ADLS_ACUITY_SCORE: 66
ADLS_ACUITY_SCORE: 81
ADLS_ACUITY_SCORE: 80
ADLS_ACUITY_SCORE: 90
ADLS_ACUITY_SCORE: 81
ADLS_ACUITY_SCORE: 66
ADLS_ACUITY_SCORE: 81
ADLS_ACUITY_SCORE: 89
ADLS_ACUITY_SCORE: 81
ADLS_ACUITY_SCORE: 84
ADLS_ACUITY_SCORE: 89
WERE_AUXILIARY_AIDS_OFFERED?: NO
ADLS_ACUITY_SCORE: 89
ADLS_ACUITY_SCORE: 81
ADLS_ACUITY_SCORE: 84
ADLS_ACUITY_SCORE: 81
ADLS_ACUITY_SCORE: 81
ADLS_ACUITY_SCORE: 86
DESCRIBE_HEARING_LOSS: BILATERAL HEARING LOSS
ADLS_ACUITY_SCORE: 81
ADLS_ACUITY_SCORE: 84
ADLS_ACUITY_SCORE: 86
ADLS_ACUITY_SCORE: 66
ADLS_ACUITY_SCORE: 86
ADLS_ACUITY_SCORE: 89
ADLS_ACUITY_SCORE: 89
ADLS_ACUITY_SCORE: 90
ADLS_ACUITY_SCORE: 89
ADLS_ACUITY_SCORE: 90
ADLS_ACUITY_SCORE: 81
ADLS_ACUITY_SCORE: 86
ADLS_ACUITY_SCORE: 86
ADLS_ACUITY_SCORE: 80
ADLS_ACUITY_SCORE: 89
ADLS_ACUITY_SCORE: 89
ADLS_ACUITY_SCORE: 84
ADLS_ACUITY_SCORE: 90
ADLS_ACUITY_SCORE: 81
ADLS_ACUITY_SCORE: 81
ADLS_ACUITY_SCORE: 89

## 2024-03-30 ENCOUNTER — HOSPITAL ENCOUNTER (INPATIENT)
Facility: HOSPITAL | Age: 89
LOS: 16 days | Discharge: HOME | DRG: 884 | End: 2024-04-15
Attending: EMERGENCY MEDICINE | Admitting: STUDENT IN AN ORGANIZED HEALTH CARE EDUCATION/TRAINING PROGRAM
Payer: COMMERCIAL

## 2024-03-30 ENCOUNTER — APPOINTMENT (OUTPATIENT)
Dept: RADIOLOGY | Facility: HOSPITAL | Age: 89
DRG: 884 | End: 2024-03-30
Attending: EMERGENCY MEDICINE
Payer: COMMERCIAL

## 2024-03-30 DIAGNOSIS — G47.00 INSOMNIA, UNSPECIFIED TYPE: ICD-10-CM

## 2024-03-30 DIAGNOSIS — N30.00 ACUTE CYSTITIS WITHOUT HEMATURIA: ICD-10-CM

## 2024-03-30 DIAGNOSIS — R41.0 CONFUSION: ICD-10-CM

## 2024-03-30 DIAGNOSIS — B99.9 CONFUSION ASSOCIATED WITH INFECTION: ICD-10-CM

## 2024-03-30 DIAGNOSIS — R63.0 ANOREXIA: ICD-10-CM

## 2024-03-30 DIAGNOSIS — R45.1 AGITATION: Primary | ICD-10-CM

## 2024-03-30 DIAGNOSIS — U07.1 COVID-19: ICD-10-CM

## 2024-03-30 DIAGNOSIS — R53.1 GENERAL WEAKNESS: ICD-10-CM

## 2024-03-30 DIAGNOSIS — R41.0 CONFUSION ASSOCIATED WITH INFECTION: ICD-10-CM

## 2024-03-30 DIAGNOSIS — K52.9 CHRONIC DIARRHEA: ICD-10-CM

## 2024-03-30 PROBLEM — H81.13 BENIGN PAROXYSMAL POSITIONAL VERTIGO DUE TO BILATERAL VESTIBULAR DISORDER: Status: ACTIVE | Noted: 2021-04-20

## 2024-03-30 LAB
ALBUMIN SERPL BCG-MCNC: 3.7 G/DL (ref 3.5–5.2)
ALBUMIN UR-MCNC: NEGATIVE MG/DL
ALP SERPL-CCNC: 97 U/L (ref 40–150)
ALT SERPL W P-5'-P-CCNC: 16 U/L (ref 0–50)
ANION GAP SERPL CALCULATED.3IONS-SCNC: 8 MMOL/L (ref 7–15)
APPEARANCE UR: ABNORMAL
AST SERPL W P-5'-P-CCNC: 16 U/L (ref 0–45)
BACTERIA #/AREA URNS HPF: ABNORMAL /HPF
BILIRUB DIRECT SERPL-MCNC: <0.2 MG/DL (ref 0–0.3)
BILIRUB SERPL-MCNC: 0.5 MG/DL
BILIRUB UR QL STRIP: NEGATIVE
BUN SERPL-MCNC: 5.3 MG/DL (ref 8–23)
CALCIUM SERPL-MCNC: 8.9 MG/DL (ref 8.2–9.6)
CHLORIDE SERPL-SCNC: 102 MMOL/L (ref 98–107)
COLOR UR AUTO: YELLOW
CREAT SERPL-MCNC: 0.83 MG/DL (ref 0.51–0.95)
DEPRECATED HCO3 PLAS-SCNC: 28 MMOL/L (ref 22–29)
EGFRCR SERPLBLD CKD-EPI 2021: 67 ML/MIN/1.73M2
ERYTHROCYTE [DISTWIDTH] IN BLOOD BY AUTOMATED COUNT: 13.1 % (ref 10–15)
GLUCOSE BLDC GLUCOMTR-MCNC: 113 MG/DL (ref 70–99)
GLUCOSE BLDC GLUCOMTR-MCNC: 97 MG/DL (ref 70–99)
GLUCOSE SERPL-MCNC: 119 MG/DL (ref 70–99)
GLUCOSE UR STRIP-MCNC: NEGATIVE MG/DL
HBA1C MFR BLD: 5.3 %
HCT VFR BLD AUTO: 39.9 % (ref 35–47)
HGB BLD-MCNC: 13.1 G/DL (ref 11.7–15.7)
HGB UR QL STRIP: ABNORMAL
KETONES UR STRIP-MCNC: NEGATIVE MG/DL
LEUKOCYTE ESTERASE UR QL STRIP: ABNORMAL
MAGNESIUM SERPL-MCNC: 2.3 MG/DL (ref 1.7–2.3)
MCH RBC QN AUTO: 32.6 PG (ref 26.5–33)
MCHC RBC AUTO-ENTMCNC: 32.8 G/DL (ref 31.5–36.5)
MCV RBC AUTO: 99 FL (ref 78–100)
NITRATE UR QL: POSITIVE
PH UR STRIP: 6 [PH] (ref 5–7)
PHOSPHATE SERPL-MCNC: 3.6 MG/DL (ref 2.5–4.5)
PLATELET # BLD AUTO: 193 10E3/UL (ref 150–450)
POTASSIUM SERPL-SCNC: 4.5 MMOL/L (ref 3.4–5.3)
PROT SERPL-MCNC: 6.4 G/DL (ref 6.4–8.3)
RBC # BLD AUTO: 4.02 10E6/UL (ref 3.8–5.2)
RBC URINE: 0 /HPF
SODIUM SERPL-SCNC: 138 MMOL/L (ref 135–145)
SP GR UR STRIP: 1.01 (ref 1–1.03)
SQUAMOUS EPITHELIAL: <1 /HPF
TSH SERPL DL<=0.005 MIU/L-ACNC: 1.33 UIU/ML (ref 0.3–4.2)
UROBILINOGEN UR STRIP-MCNC: <2 MG/DL
WBC # BLD AUTO: 10.9 10E3/UL (ref 4–11)
WBC CLUMPS #/AREA URNS HPF: PRESENT /HPF
WBC URINE: 101 /HPF

## 2024-03-30 PROCEDURE — 250N000011 HC RX IP 250 OP 636: Performed by: EMERGENCY MEDICINE

## 2024-03-30 PROCEDURE — 84443 ASSAY THYROID STIM HORMONE: CPT | Performed by: STUDENT IN AN ORGANIZED HEALTH CARE EDUCATION/TRAINING PROGRAM

## 2024-03-30 PROCEDURE — 99285 EMERGENCY DEPT VISIT HI MDM: CPT | Mod: 25

## 2024-03-30 PROCEDURE — 120N000001 HC R&B MED SURG/OB

## 2024-03-30 PROCEDURE — 81001 URINALYSIS AUTO W/SCOPE: CPT | Performed by: EMERGENCY MEDICINE

## 2024-03-30 PROCEDURE — 85027 COMPLETE CBC AUTOMATED: CPT | Performed by: EMERGENCY MEDICINE

## 2024-03-30 PROCEDURE — 83735 ASSAY OF MAGNESIUM: CPT | Performed by: EMERGENCY MEDICINE

## 2024-03-30 PROCEDURE — 71045 X-RAY EXAM CHEST 1 VIEW: CPT

## 2024-03-30 PROCEDURE — 250N000013 HC RX MED GY IP 250 OP 250 PS 637: Performed by: STUDENT IN AN ORGANIZED HEALTH CARE EDUCATION/TRAINING PROGRAM

## 2024-03-30 PROCEDURE — G0378 HOSPITAL OBSERVATION PER HR: HCPCS

## 2024-03-30 PROCEDURE — 83036 HEMOGLOBIN GLYCOSYLATED A1C: CPT | Performed by: STUDENT IN AN ORGANIZED HEALTH CARE EDUCATION/TRAINING PROGRAM

## 2024-03-30 PROCEDURE — 36415 COLL VENOUS BLD VENIPUNCTURE: CPT | Performed by: EMERGENCY MEDICINE

## 2024-03-30 PROCEDURE — 258N000003 HC RX IP 258 OP 636: Performed by: STUDENT IN AN ORGANIZED HEALTH CARE EDUCATION/TRAINING PROGRAM

## 2024-03-30 PROCEDURE — 87086 URINE CULTURE/COLONY COUNT: CPT | Performed by: EMERGENCY MEDICINE

## 2024-03-30 PROCEDURE — 96365 THER/PROPH/DIAG IV INF INIT: CPT

## 2024-03-30 PROCEDURE — 84100 ASSAY OF PHOSPHORUS: CPT | Performed by: STUDENT IN AN ORGANIZED HEALTH CARE EDUCATION/TRAINING PROGRAM

## 2024-03-30 PROCEDURE — 82248 BILIRUBIN DIRECT: CPT | Performed by: EMERGENCY MEDICINE

## 2024-03-30 PROCEDURE — 93005 ELECTROCARDIOGRAM TRACING: CPT | Performed by: EMERGENCY MEDICINE

## 2024-03-30 PROCEDURE — 80053 COMPREHEN METABOLIC PANEL: CPT | Performed by: EMERGENCY MEDICINE

## 2024-03-30 PROCEDURE — 99223 1ST HOSP IP/OBS HIGH 75: CPT | Performed by: STUDENT IN AN ORGANIZED HEALTH CARE EDUCATION/TRAINING PROGRAM

## 2024-03-30 RX ORDER — ONDANSETRON 4 MG/1
4 TABLET, ORALLY DISINTEGRATING ORAL EVERY 6 HOURS PRN
Status: DISCONTINUED | OUTPATIENT
Start: 2024-03-30 | End: 2024-04-15 | Stop reason: HOSPADM

## 2024-03-30 RX ORDER — CEFTRIAXONE 1 G/1
1 INJECTION, POWDER, FOR SOLUTION INTRAMUSCULAR; INTRAVENOUS EVERY 24 HOURS
Status: DISCONTINUED | OUTPATIENT
Start: 2024-03-31 | End: 2024-04-01

## 2024-03-30 RX ORDER — ONDANSETRON 2 MG/ML
4 INJECTION INTRAMUSCULAR; INTRAVENOUS EVERY 6 HOURS PRN
Status: DISCONTINUED | OUTPATIENT
Start: 2024-03-30 | End: 2024-04-15 | Stop reason: HOSPADM

## 2024-03-30 RX ORDER — AMOXICILLIN 250 MG
1 CAPSULE ORAL 2 TIMES DAILY PRN
Status: DISCONTINUED | OUTPATIENT
Start: 2024-03-30 | End: 2024-04-15 | Stop reason: HOSPADM

## 2024-03-30 RX ORDER — SODIUM CHLORIDE 9 MG/ML
INJECTION, SOLUTION INTRAVENOUS CONTINUOUS
Status: DISCONTINUED | OUTPATIENT
Start: 2024-03-30 | End: 2024-03-31

## 2024-03-30 RX ORDER — GUAIFENESIN 200 MG/10ML
200 LIQUID ORAL EVERY 4 HOURS PRN
COMMUNITY

## 2024-03-30 RX ORDER — LISINOPRIL 5 MG/1
5 TABLET ORAL DAILY
COMMUNITY

## 2024-03-30 RX ORDER — AMOXICILLIN 250 MG
2 CAPSULE ORAL 2 TIMES DAILY PRN
Status: DISCONTINUED | OUTPATIENT
Start: 2024-03-30 | End: 2024-04-15 | Stop reason: HOSPADM

## 2024-03-30 RX ORDER — LIDOCAINE 40 MG/G
CREAM TOPICAL
Status: DISCONTINUED | OUTPATIENT
Start: 2024-03-30 | End: 2024-04-15 | Stop reason: HOSPADM

## 2024-03-30 RX ORDER — ACETAMINOPHEN 500 MG
1000 TABLET ORAL EVERY 6 HOURS PRN
Status: DISCONTINUED | OUTPATIENT
Start: 2024-03-30 | End: 2024-04-05

## 2024-03-30 RX ORDER — LISINOPRIL 5 MG/1
5 TABLET ORAL DAILY
Status: DISCONTINUED | OUTPATIENT
Start: 2024-03-30 | End: 2024-04-15 | Stop reason: HOSPADM

## 2024-03-30 RX ORDER — LEVOTHYROXINE SODIUM 100 UG/1
100 TABLET ORAL DAILY
Status: DISCONTINUED | OUTPATIENT
Start: 2024-03-30 | End: 2024-04-15 | Stop reason: HOSPADM

## 2024-03-30 RX ORDER — LANOLIN ALCOHOL/MO/W.PET/CERES
1000 CREAM (GRAM) TOPICAL DAILY
Status: ON HOLD | COMMUNITY
End: 2024-04-15

## 2024-03-30 RX ORDER — LOPERAMIDE HCL 2 MG
2 CAPSULE ORAL DAILY PRN
COMMUNITY

## 2024-03-30 RX ORDER — CEFTRIAXONE 1 G/1
1 INJECTION, POWDER, FOR SOLUTION INTRAMUSCULAR; INTRAVENOUS ONCE
Qty: 10 ML | Refills: 0 | Status: COMPLETED | OUTPATIENT
Start: 2024-03-30 | End: 2024-03-30

## 2024-03-30 RX ORDER — CALCIUM CARBONATE 500 MG/1
1000 TABLET, CHEWABLE ORAL 4 TIMES DAILY PRN
Status: DISCONTINUED | OUTPATIENT
Start: 2024-03-30 | End: 2024-04-15 | Stop reason: HOSPADM

## 2024-03-30 RX ORDER — QUETIAPINE FUMARATE 25 MG/1
25 TABLET, FILM COATED ORAL ONCE
Status: COMPLETED | OUTPATIENT
Start: 2024-03-30 | End: 2024-03-30

## 2024-03-30 RX ORDER — GUAIFENESIN 200 MG/10ML
200 LIQUID ORAL EVERY 4 HOURS PRN
Status: DISCONTINUED | OUTPATIENT
Start: 2024-03-30 | End: 2024-04-15 | Stop reason: HOSPADM

## 2024-03-30 RX ORDER — LANOLIN ALCOHOL/MO/W.PET/CERES
1000 CREAM (GRAM) TOPICAL DAILY
Status: DISCONTINUED | OUTPATIENT
Start: 2024-03-30 | End: 2024-04-14

## 2024-03-30 RX ADMIN — CYANOCOBALAMIN TAB 1000 MCG 1000 MCG: 1000 TAB at 14:57

## 2024-03-30 RX ADMIN — CEFTRIAXONE SODIUM 1 G: 1 INJECTION, POWDER, FOR SOLUTION INTRAMUSCULAR; INTRAVENOUS at 11:53

## 2024-03-30 RX ADMIN — ACETAMINOPHEN 1000 MG: 500 TABLET ORAL at 17:22

## 2024-03-30 RX ADMIN — LISINOPRIL 5 MG: 5 TABLET ORAL at 14:57

## 2024-03-30 RX ADMIN — SODIUM CHLORIDE: 9 INJECTION, SOLUTION INTRAVENOUS at 14:52

## 2024-03-30 RX ADMIN — QUETIAPINE FUMARATE 25 MG: 25 TABLET ORAL at 17:39

## 2024-03-30 ASSESSMENT — ACTIVITIES OF DAILY LIVING (ADL)
ADLS_ACUITY_SCORE: 42
ADLS_ACUITY_SCORE: 36
ADLS_ACUITY_SCORE: 44
ADLS_ACUITY_SCORE: 35
DEPENDENT_IADLS:: CLEANING;COOKING;LAUNDRY;SHOPPING;MEAL PREPARATION;MEDICATION MANAGEMENT;TRANSPORTATION
ADLS_ACUITY_SCORE: 35
ADLS_ACUITY_SCORE: 44
ADLS_ACUITY_SCORE: 35
ADLS_ACUITY_SCORE: 33
ADLS_ACUITY_SCORE: 42
ADLS_ACUITY_SCORE: 35
ADLS_ACUITY_SCORE: 42

## 2024-03-30 NOTE — CONSULTS
"Care Management Initial Consult    General Information  Assessment completed with: Children, Mariela confused. Talked to daughters Ginger and Lyla  Type of CM/SW Visit: Initial Assessment    Primary Care Provider verified and updated as needed: Yes   Readmission within the last 30 days: no previous admission in last 30 days      Reason for Consult: discharge planning  Advance Care Planning: Advance Care Planning Reviewed: no concerns identified          Communication Assessment  Patient's communication style: spoken language (English or Bilingual)             Cognitive  Cognitive/Neuro/Behavioral:                        Living Environment:   People in home: child(cristino), adult  Mariela and daughter Judith  Current living Arrangements: house (\"It is Mariela's house that she and Judith live in\".)      Able to return to prior arrangements: other (see comments) (family wants a TCU)       Family/Social Support:  Care provided by: self, child(cristino)  Provides care for: no one, unable/limited ability to care for self  Marital Status:   Children          Description of Support System: Supportive, Involved    Support Assessment: Adequate family and caregiver support, Adequate social supports    Current Resources:   Patient receiving home care services: No     Community Resources: DME  Equipment currently used at home: cane, straight, walker, rolling (\"She most often uses the cane. Also has a 4WW if needed\".)  Supplies currently used at home: Incontinence Supplies, Other (\"partial dentures, hearing aid: 1 lost and she doesn't like to wear them\".)    Employment/Financial:  Employment Status: retired     Employment/ Comments: \"no  history\"  Financial Concerns:     Referral to Financial Worker: No       Does the patient's insurance plan have a 3 day qualifying hospital stay waiver?  No    Lifestyle & Psychosocial Needs:  Social Determinants of Health     Food Insecurity: Not on file   Depression: Not on file " "  Housing Stability: Not on file   Tobacco Use: Medium Risk (4/10/2022)    Patient History     Smoking Tobacco Use: Former     Smokeless Tobacco Use: Never     Passive Exposure: Not on file   Financial Resource Strain: Not on file   Alcohol Use: Not on file   Transportation Needs: Not on file   Physical Activity: Not on file   Interpersonal Safety: Not on file   Stress: Not on file   Social Connections: Not on file       Functional Status:  Prior to admission patient needed assistance:   Dependent ADLs:: Ambulation-cane, Ambulation-walker, Bathing, Dressing (\"daughters help some. Much increased help is needed over the past few days\")  Dependent IADLs:: Cleaning, Cooking, Laundry, Shopping, Meal Preparation, Medication Management, Transportation  Assesssment of Functional Status: Not at baseline with ADL Functioning, Not at baseline with mobility, Not at  functional baseline    Mental Health Status:          Chemical Dependency Status:                Values/Beliefs:  Spiritual, Cultural Beliefs, [employer] Practices, Values that affect care:                 Additional Information:  Mariela was found to be COVID + on 3/27/24. Family had brought her to [facility] ER on 3/27/24, but family took her home because they didn't want to board in the ER. They took her home, but she was \"too much work at home with her worsening confusion, so brought her to [facility] ER.\"    She lives in a house with her daughter Judith. It is Mariela's house. Judith is \"not with her during the daytimes when she is working. Normally this is fine that she is alone sometimes, but over the past few days she has had increased confusion and increased agitation.\" \"She most often uses the cane. Also has a 4WW if needed\".    She is independent with most ADLs and gets help with all IADLs.     Family wants her to go to TCU. Will need PT/OT recommendations. [facility]. [facility], [facility], 3. [facility] or [place] or " [facility] or [facility]. I did discuss with Stephanie and Lyla about her needing a qualifying stay for insurance to cover or for her [employer] to waive the qualifying stay. They are aware it might be private pay, but that we will inform them if it would be private pay.    Stephanie daughter [phone]. (Call me first).    Family to transport at discontinue.    KEARNS was given and discussed. All questions were answered.    CM to follow for medical progression of care, discharge recommendations, and final discharge plan.    Zita Hutchison RN

## 2024-03-30 NOTE — ED NOTES
[facility] ED Handoff Report    ED Chief Complaint: generalized weakness, cough, confusion     ED Diagnosis:  (R53.1) General weakness  Comment:   Plan:     (R63.0) Anorexia  Comment:   Plan:     (R41.0) Confusion  Comment:   Plan:     (B99.9,  R41.0) Confusion associated with infection  Comment:   Plan:     (K52.9) Chronic diarrhea  Comment:   Plan:     (N30.00) Acute cystitis without hematuria  Comment:   Plan:     (U07.1) COVID-19  Comment:   Plan:        PMH:    Past Medical History:   Diagnosis Date    Melanoma (H)         Code Status:  No Order     Falls Risk: Yes Band: Applied    Current Living Situation/Residence: lives with their son or daughter     Elimination Status: Continent: No     Activity Level: SBA w/ walker    Patients Preferred Language:  English     Needed: No    Vital Signs:  /58   Pulse 61   Temp 98.4  F (36.9  C) (Oral)   Resp 16   SpO2 100%      Cardiac Rhythm: wide QRS rhythm     Pain Score: Patient is unable to quantify.    Is the Patient Confused:  Yes    Last Food or Drink: 03/30/24 at 1215    Focused Assessment:  patient is hard of hearing. Lives with daughter. Was seen on 3/27/24 at [facility] ER and diagnosed with covid. Since discharge, pt continues to be confused, having diarrhea and worsening cough at night.Uses a walker/cane at home Family looking for TCU post hospitalization for patient to get stronger.      Tests Performed: Done: Labs and Imaging    Treatments Provided:  IV antibiotics     Family Dynamics/Concerns: No    Family Updated On Visitor Policy: Yes    Plan of Care Communicated to Family: Yes    Who Was Updated about Plan of Care: 2 daughters at bedside     Belongings Checklist Done and Signed by Patient: Yes    Medications sent with patient: none     Covid: symptomatic, positive    Additional Information:     RN: Dorothea Cuellar RN 3/30/2024 12:20 PM

## 2024-03-30 NOTE — H&P
[facility]    History and Physical - Hospitalist Service       Date of Admission:  3/30/2024    Assessment & Plan    Assessment:  Mariela Ortiz is a 89 year old female admitted on 3/30/2024. She  presented to the ER with complaint of flulike symptoms for the past 3 weeks associated with cough, generalized weakness, poor appetite and oral intake, decreased mobility and diarrhea. Patient received ceftriaxone in the ED and is going to be admitted for generalized weakness secondary to dehydration requiring fluid resuscitation secondary to poor oral intake, acute on chronic diarrhea to rule out acute infectious etiology, and UTI.    Problem list and plan:  Generalized weakness secondary to dehydration  Failure to thrive secondary to poor appetite and poor oral intake secondary to COVID-19 infection and UTI  Decreased mobility secondary to weakness  Acute on chronic diarrhea possibly in setting of viral illness  Patient presented to the ER with complaint of flulike symptoms for the past 3 weeks associated with cough, generalized weakness, poor appetite and oral intake, decreased mobility and diarrhea  Family stated that they initially brought her to the ER 3 days ago at which time during the waiting process she got agitated and they brought her home but since then the symptoms have not improved and progressively worsened  Also stated that 3 days ago patient was tested for COVID and was positive for COVID.    Urinalysis with positive nitrates, leukocyte esterase, pyuria and bacteriuria suspicious for UTI, urine cultures pending  Patient received ceftriaxone in the ED will continue for now  X-ray did not show any acute process other than some atelectasis and patient did not have any pulmonary complaints  Patient is going to be admitted for generalized weakness secondary to dehydration requiring fluid resuscitation secondary to poor oral intake, acute on chronic diarrhea to rule out acute  infectious etiology, and UTI.  Continue with Tylenol  Continue with cough medication  No need for treatment for COVID for now as patient does not appear to be hypoxic and does not have any pulmonary complaints other than cough Currently holding loperamide as ruling out GI bacteria and viruses, if no infectious etiology is found can restart loperamide  Continue gentle IV hydration    Chronic lymphocytic leukemia  As per chart review patient has history of chronic lymphocytic leukemia, she follows with hematology once a year and has not required any treatment in the past is undergoing surveillance by her oncologist    Cognitive impairment with dementia  Monitor mental status closely  Appears mostly at baseline  Consider sitter if needed  Try to avoid sedating medication as at risk of delirium    Mild hyperglycemia most likely reactive  Hemoglobin A1c 5.3  Monitor blood sugar levels intermittently    Physical deconditioning/weakness  PT and OT and case management consulted for placement  Fall precautions    History of hypothyroidism  Continue with levothyroxine  Follow-up TSH    History of hypertension  Continue with lisinopril  Monitor vital signs as per protocol    DVT PPx  Intermittent pneumatic compression    CODE STATUS  Full code as per discussion with the patients family, as per patient's daughter patient does want to be resuscitated but if she goes into a vegetative state does not want to end up on a ventilator        Diet: Combination Diet Regular Diet Adult    DVT Prophylaxis: Pneumatic Compression Devices  Zaman Catheter: Not present  Lines: None     Cardiac Monitoring: None  Code Status: Full Code    Mental status: Patient was alert awake but confused, most of the history was obtained from patient's family at bedside, some history was obtained from chart review and the rest of the history was obtained from discussion with the ER physician.  Clinically Significant Risk Factors Present on Admission                   # Hypertension: Noted on problem list   # Dementia: noted on problem list               Disposition Plan      Expected Discharge Date: 04/01/2024                  Saad J. Gondal, MD  Hospitalist Service  [facility]  Securely message with [employer] (more info)  Text page via [employer] Paging/Directory     ______________________________________________________________________    Chief Complaint   Cough, generalized weakness, poor appetite and oral intake, decreased mobility, diarrhea    History is obtained from family at bedside    History of Present Illness   Mariela Ortiz is a 89 year old female with a past medical history documented below presented to the ER with complaint of flulike symptoms for the past 3 weeks associated with cough, generalized weakness, poor appetite and oral intake, decreased mobility and diarrhea, as per patient's family at bedside patient has some degree of mild cognitive deficit and gets agitated from time to time and also has chronic diarrhea, for the past 3 weeks patient has been having symptoms of cough, generalized weakness and poor appetite along with poor oral intake and he has not been significantly mobile and also diarrhea appeared to be worsened as if now she is not able to reach the bathroom and has accidents.  Family stated that they initially brought her to the ER 3 days ago at which time during the waiting process she got agitated and they brought her home but since then the symptoms have not improved and progressively worsened to the point where they had to bring her today again to the ER plus they were also suspecting UTI as a cause of worsening symptoms, also stated that 3 days ago patient was tested for COVID and was positive for COVID.  In the ER vitals were fairly within normal limits, labs were significant for mild hyperglycemia most likely reactive, urinalysis with positive nitrates, leukocyte esterase, pyuria and bacteriuria suspicious for  UTI, urine cultures pending, x-ray did not show any acute process other than some atelectasis and patient did not have any pulmonary complaints, patient received ceftriaxone in the ED and is going to be admitted for generalized weakness secondary to dehydration requiring fluid resuscitation secondary to poor oral intake, acute on chronic diarrhea to rule out acute infectious etiology, and UTI.      Past Medical History    Past Medical History:   Diagnosis Date    Melanoma (H)        Past Surgical History   Past Surgical History:   Procedure Laterality Date    APPENDECTOMY      CHOLECYSTECTOMY, LAPOROSCOPIC      Cholecystectomy, Laparoscopic    HYSTERECTOMY, PAP NO LONGER INDICATED  1981    menorrhagia    JOINT REPLACEMENT, HIP RT/LT  fall 2002    right    JOINT REPLACEMTN, KNEE RT/LT  2002    Joint Replacement knee RT/LT       Prior to Admission Medications   Prior to Admission Medications   Prescriptions Last Dose Informant Patient Reported? Taking?   acetaminophen (TYLENOL) 500 MG tablet 3/29/2024 at AM  Yes Yes   Sig: Take 1,000 mg by mouth every 6 hours as needed for pain   amoxicillin (AMOXIL) 500 MG capsule More than a month at PRN  Yes Yes   Sig: Take 500 mg by mouth 4 tablets 1 hour before dental.   cyanocobalamin (VITAMIN B-12) 1000 MCG tablet   Yes Yes   Sig: Take 1,000 mcg by mouth daily   guaiFENesin (ROBITUSSIN) 20 mg/mL liquid 3/29/2024 at HS  Yes Yes   Sig: Take 200 mg by mouth every 4 hours as needed for cough   levothyroxine (SYNTHROID/LEVOTHROID) 100 MCG tablet 3/30/2024 at AM  Yes Yes   Sig: Take 100 mcg by mouth daily   lisinopril (ZESTRIL) 5 MG tablet 3/30/2024 at AM  Yes Yes   Sig: Take 5 mg by mouth daily   loperamide (IMODIUM) 2 MG capsule 3/29/2024 at AM  Yes Yes   Sig: Take 2 mg by mouth daily as needed for diarrhea      Facility-Administered Medications: None        Review of Systems    The 10 point Review of Systems is negative other than noted in the HPI or here. Cough, generalized  weakness, poor appetite and oral intake, decreased mobility, diarrhea    Physical Exam   Vital Signs: Temp: 98.4  F (36.9  C) Temp src: Oral BP: 130/58 Pulse: 61   Resp: 16 SpO2: 100 % O2 Device: None (Room air)    Weight: 0 lbs 0 oz    GENERAL: Patient was seen and examined at bedside with no acute distress, chronically ill-appearing and frail  HENT:  Head is normocephalic, atraumatic.  Sunken eyes, pale conjunctival mucosa  EYES:  Eyes have anicteric sclerae without conjunctival injection   LUNGS: Bilateral air entry, clear to auscultation bilaterally  CARDIOVASCULAR:  Regular rate and rhythm with no murmurs, gallops or rubs.  ABDOMEN:  Normal bowel sounds. Soft; nontender   EXT: no edema    SKIN:  No acute rashes.  Dry scaly wrinkled skin, poor skin turgor  NEUROLOGIC:  Grossly nonfocal.  Alert and awake but oriented to self and family      Medical Decision Making       80 MINUTES SPENT BY ME on the date of service doing chart review, history, exam, documentation & further activities per the note.      Data     I have personally reviewed the following data over the past 24 hrs:    10.9  \   13.1   / 193     138 102 5.3 (L) /  119 (H)   4.5 28 0.83 \     ALT: 16 AST: 16 AP: 97 TBILI: 0.5   ALB: 3.7 TOT PROTEIN: 6.4 LIPASE: N/A     TSH: N/A T4: N/A A1C: 5.3       Imaging results reviewed over the past 24 hrs:   Recent Results (from the past 24 hour(s))   XR Chest Port 1 View    Narrative    EXAM: XR CHEST PORT 1 VIEW  LOCATION: [facility]  DATE: 3/30/2024    INDICATION: cough + covid  COMPARISON: None.      Impression    IMPRESSION: Small patchy opacity in the right lower lung, which could represent atelectasis or pneumonia. No pleural effusion or pneumothorax. Normal cardiomediastinal silhouette. Degenerative changes in the spine.

## 2024-03-30 NOTE — PHARMACY-ADMISSION MEDICATION HISTORY
Pharmacist Admission Medication History    Admission medication history is complete. The information provided in this note is only as accurate as the sources available at the time of the update.    Information Source(s): Patient and CareEverywhere/SureScripts via in-person    Pertinent Information: Patient's daughters manage her meds. Have been giving APAP and imodium scheduled for the past week.     Changes made to PTA medication list:  Added: imodium, lisinopril, vitamin B12  Deleted: Pantaprazole, lomotil, calcium, vitamin D3  Changed: None    Allergies reviewed with patient and updates made in EHR: yes    Medication History Completed By: Elizabeth Leon [facility] 3/30/2024 11:10 AM    PTA Med List   Medication Sig Last Dose    acetaminophen (TYLENOL) 500 MG tablet Take 1,000 mg by mouth every 6 hours as needed for pain 3/29/2024 at AM    amoxicillin (AMOXIL) 500 MG capsule Take 500 mg by mouth 4 tablets 1 hour before dental. More than a month at PRN    cyanocobalamin (VITAMIN B-12) 1000 MCG tablet Take 1,000 mcg by mouth daily     guaiFENesin (ROBITUSSIN) 20 mg/mL liquid Take 200 mg by mouth every 4 hours as needed for cough 3/29/2024 at HS    levothyroxine (SYNTHROID/LEVOTHROID) 100 MCG tablet Take 100 mcg by mouth daily 3/30/2024 at AM    lisinopril (ZESTRIL) 5 MG tablet Take 5 mg by mouth daily 3/30/2024 at AM    loperamide (IMODIUM) 2 MG capsule Take 2 mg by mouth daily as needed for diarrhea 3/29/2024 at AM

## 2024-03-30 NOTE — ED TRIAGE NOTES
Pt comes it with her daughter from home. She was diagnosed with covid on 3/27/24. Comes in today with complaints of cough, diarrhea and generalized weakness.

## 2024-03-30 NOTE — ED PROVIDER NOTES
EMERGENCY DEPARTMENT ENCOUNTER      NAME: Mariela Ortiz  AGE: 89 year old female  YOB: 1933  MRN: [id number]  EVALUATION DATE & TIME: 3/30/2024 10:27 AM    PCP: [facility]    ED PROVIDER: Sapphire Sandoval MD    Chief Complaint   Patient presents with    Generalized Weakness    Cough         FINAL IMPRESSION:  1. General weakness    2. Anorexia    3. Confusion    4. Confusion associated with infection    5. Chronic diarrhea    6. Acute cystitis without hematuria    7. COVID-19          ED COURSE & MEDICAL DECISION MAKING:    10:34 AM Spoke to RN Care Manager about plan for admission.  10:35 AM Met with the patient and performed my initial exam.  12:09 PM Spoke to Dr. Gondal, Hospitalist, regarding plan for admission.    Pertinent Labs & Imaging studies reviewed. (See chart for details)  89 year old female with history of HTN, BPPV, dementia, HLD who presents to the Emergency Department for evaluation of increasing generalized weakness, labile confusion and worsening diarrhea since being diagnosed with COVID-19 on 3/27.  Family no longer able to take care of patient properly.  Her exam is unremarkable, nonfocal though she is not oriented which is reportedly more or less baseline though her mental status does wax and wane.  I suspect that there might be some concurrent UTI with her worsening chronic diarrhea.  Differential includes dehydration, electrolyte abnormality, worsening cognitive impairment/dementia.    Patient placed on monitor, IV established and blood obtained.  CBC, BMP, LFTs, magnesium unremarkable.  Urinalysis nitrite positive, covered with Rocephin.  No respiratory distress but does have questionable right lower lobe infiltrate versus atelectasis on chest x-ray.  The Rocephin should also help cover for this.  Will be admitted to medicine for further management.      ED Course as of 03/30/24 1706   Sat Mar 30, 2024   1127 Nitrite Urine(!): Positive   1158 XR Chest  Port 1 View  Chest x-ray independently interpreted by myself.  Slightly rotated.  No cardiomegaly, effusion.  Questionable mild right lower lobe infiltrate       Medical Decision Making    History:  Supplemental history from: Documented in chart and Family Member/Significant Other  External Record(s) reviewed: Documented in chart and Outpatient Record: ED visit to [facility] ER for weakness and cough on 3/27/2024    Work Up:  Chart documentation includes differential considered and any EKGs or imaging independently interpreted by provider, see MDM  In additional to work up documented, I considered the following work up: see MDM    External consultation:  Discussion of management with another provider: Hospitalist    Complicating factors:  Care impacted by chronic illness: Cancer/Chemotherapy, Dementia, Diabetes, and Other: chronic diarrhea, chronic dizziness, PAD, CLL, and chronic cognitive impairment  Care affected by social determinants of health: Access to Affordable Health Care    Disposition considerations: Admit.        At the conclusion of the encounter I discussed the results of all of the tests and the disposition. The questions were answered. The patient or family acknowledged understanding and was agreeable with the care plan.    MEDICATIONS GIVEN IN THE EMERGENCY:  Medications   cefTRIAXone (ROCEPHIN) 1 g vial to attach to  mL bag for ADULTS or NS 50 mL bag for PEDS (has no administration in time range)   sodium chloride 0.9 % infusion ( Intravenous $New Bag 3/30/24 1452)   lidocaine 1 % 0.1-1 mL (has no administration in time range)   lidocaine (LMX4) cream (has no administration in time range)   sodium chloride (PF) 0.9% PF flush 3 mL (3 mLs Intracatheter Not Given 3/30/24 1452)   sodium chloride (PF) 0.9% PF flush 3 mL (has no administration in time range)   senna-docusate (SENOKOT-S/PERICOLACE) 8.6-50 MG per tablet 1 tablet (has no administration in time range)     Or    senna-docusate (SENOKOT-S/PERICOLACE) 8.6-50 MG per tablet 2 tablet (has no administration in time range)   calcium carbonate (TUMS) chewable tablet 1,000 mg (has no administration in time range)   ondansetron (ZOFRAN ODT) ODT tab 4 mg (has no administration in time range)     Or   ondansetron (ZOFRAN) injection 4 mg (has no administration in time range)   acetaminophen (TYLENOL) tablet 1,000 mg (has no administration in time range)   cyanocobalamin (VITAMIN B-12) tablet 1,000 mcg (1,000 mcg Oral $Given 3/30/24 3637)   guaiFENesin (ROBITUSSIN) 20 mg/mL solution 200 mg (has no administration in time range)   levothyroxine (SYNTHROID/LEVOTHROID) tablet 100 mcg (100 mcg Oral Not Given 3/30/24 2577)   lisinopril (ZESTRIL) tablet 5 mg (5 mg Oral $Given 3/30/24 8317)   cefTRIAXone (ROCEPHIN) 1 g vial to attach to  mL bag for ADULTS or NS 50 mL bag for PEDS (0 g Intravenous Stopped 3/30/24 1231)       NEW PRESCRIPTIONS STARTED AT TODAY'S ER VISIT  Current Discharge Medication List             =================================================================    HPI    [facility]    Use of Intrepreter: N/A       Mariela SUMNER Diana is a 89 year old female with pertinent medical history of dementia, chronic cognitive impairment, chronic dizziness, chronic diarrhea, PAD, CLL, and T2DM, who presents with cough and decreased appetite.    Per chart review, the patient presented to [facility] ER for weakness and cough on 3/27/2024. Patient lives with family full-time, has history of daily alcohol use in the past and OA with bilateral knee replacements hip replacement, chronic diarrhea with fecal urgency also neuropathy. Patient was brought in the ER by family because of progressive problems with persistent diarrhea, loss of appetite, weakness, worsening confusion, and modest cough with no chest pain or vomiting. At the ER, she was found to be  "COVID-positive and chest x-ray was normal with no infiltrates. Patient was admitted for observation and fluids. Patient and family became frustrated with lack of movement with admission due to boarding crisis and so family decided to take patient home.    Per patient's daughter (not daughter currently living with), patient lives with other daughter and has some chronic cognitive impairment, and chronic diarrhea. Patient's daughter works during the day, so patient is at home by herself for some periods of time off and on. Patient has been waxing and waning, has intermittent good and bad moments, and is at baseline manageable alone. This last week, patient has been having more diarrhea and has been wearing Depends diapers and has some episodes where she can't get to the bathroom and had episodes of bowel incontinence. Patient has been more agitated, more \"noncompliant\", and has a cough which is new symptom. Patient was seen at [place] a few days ago and was about to get admitted, but due to boarding crisis, admission was taking long, so patient was agitated and family decided to take her home. Since ED visit to [place], patient symptoms has worsened and patient's daughters has been taking turns taking care of patient at home while one of her daughters is at work. Patient has been more confused, agitated, and has a poor appetite and not been eating or drinking much, patient noting \"everything taste like shit\". Family is hoping patient can go to rehab so everything can \"go back to normal\". No other reported complaints or concerns at this time.      PAST MEDICAL HISTORY:  Past Medical History:   Diagnosis Date    Melanoma (H)        PAST SURGICAL HISTORY:  Past Surgical History:   Procedure Laterality Date    APPENDECTOMY      CHOLECYSTECTOMY, LAPOROSCOPIC      Cholecystectomy, Laparoscopic    HYSTERECTOMY, PAP NO LONGER INDICATED  1981    menorrhagia    JOINT REPLACEMENT, HIP RT/LT  fall 2002    right    JOINT REPLACEMTN, " KNEE RT/LT  2002    Joint Replacement knee RT/LT       CURRENT MEDICATIONS:    Prior to Admission Medications   Prescriptions Last Dose Informant Patient Reported? Taking?   acetaminophen (TYLENOL) 500 MG tablet 3/29/2024 at AM  Yes Yes   Sig: Take 1,000 mg by mouth every 6 hours as needed for pain   amoxicillin (AMOXIL) 500 MG capsule More than a month at PRN  Yes Yes   Sig: Take 500 mg by mouth 4 tablets 1 hour before dental.   cyanocobalamin (VITAMIN B-12) 1000 MCG tablet   Yes Yes   Sig: Take 1,000 mcg by mouth daily   guaiFENesin (ROBITUSSIN) 20 mg/mL liquid 3/29/2024 at HS  Yes Yes   Sig: Take 200 mg by mouth every 4 hours as needed for cough   levothyroxine (SYNTHROID/LEVOTHROID) 100 MCG tablet 3/30/2024 at AM  Yes Yes   Sig: Take 100 mcg by mouth daily   lisinopril (ZESTRIL) 5 MG tablet 3/30/2024 at AM  Yes Yes   Sig: Take 5 mg by mouth daily   loperamide (IMODIUM) 2 MG capsule 3/29/2024 at AM  Yes Yes   Sig: Take 2 mg by mouth daily as needed for diarrhea      Facility-Administered Medications: None       ALLERGIES:  Allergies   Allergen Reactions    Morphine Rash and Unknown     Red line went up her arm when given  Other reaction(s): Erythema  Red line went up her arm when given      Oxycodone-Acetaminophen Rash       FAMILY HISTORY:  Family History   Problem Relation Age of Onset    Diabetes Daughter        SOCIAL HISTORY:  Social History     Tobacco Use    Smoking status: Former     Types: Cigarettes     Start date: 4/1/2002    Smokeless tobacco: Never   Substance Use Topics    Alcohol use: Yes     Comment: 2 beers a week    Drug use: No        VITALS:  Patient Vitals for the past 24 hrs:   BP Temp Temp src Pulse Resp SpO2   03/30/24 1647 (!) 161/87 98.3  F (36.8  C) Oral 67 18 95 %   03/30/24 1606 (!) 148/102 -- -- 66 -- 98 %   03/30/24 1536 (!) 142/91 -- -- 64 -- 94 %   03/30/24 1506 -- -- -- 64 -- 99 %   03/30/24 1500 (!) 146/70 -- -- 64 -- 97 %   03/30/24 1457 (!) 146/70 -- -- -- -- --   03/30/24  "1056 -- 98.4  F (36.9  C) Oral -- -- --   03/30/24 1038 130/58 -- -- 61 16 100 %       PHYSICAL EXAM    General Appearance: Well-appearing, well-nourished, no acute distress. Thin and frail appearing.  Head:  Normocephalic, atraumatic  Eyes:  conjunctiva/corneas clear  ENT:   membranes are moist without pallor  Neck:  Supple, no nuchal rigidity or meningismus  Cardio:  Regular rate and rhythm, no murmur/gallop/rub  Pulm:  No respiratory distress, clear to auscultation bilaterally. Rhonchi on expiration.  Back:   No CVA tenderness, normal ROM  Abdomen:  Soft, non-tender, non distended,no rebound or guarding.  Extremities: Extremities atraumatic  Skin:  Skin warm, dry, no rashes  Neuro:  Alert and oriented to self, moving all extremities, no gross sensory defects. When patient was asked where she was at, she respond by saying \"how should I know\". When asked what the current year is, patient responds by saying \"yes\", but reports she wasn't going to tell me the year. When asked the current season, patient reports \"could be Spring or Fall\".      RADIOLOGY/LABS:  Reviewed all pertinent imaging. Please see official radiology report. All pertinent labs reviewed and interpreted.    Results for orders placed or performed during the hospital encounter of 03/30/24   XR Chest Port 1 View    Impression    IMPRESSION: Small patchy opacity in the right lower lung, which could represent atelectasis or pneumonia. No pleural effusion or pneumothorax. Normal cardiomediastinal silhouette. Degenerative changes in the spine.   Basic metabolic panel   Result Value Ref Range    Sodium 138 135 - 145 mmol/L    Potassium 4.5 3.4 - 5.3 mmol/L    Chloride 102 98 - 107 mmol/L    Carbon Dioxide (CO2) 28 22 - 29 mmol/L    Anion Gap 8 7 - 15 mmol/L    Urea Nitrogen 5.3 (L) 8.0 - 23.0 mg/dL    Creatinine 0.83 0.51 - 0.95 mg/dL    GFR Estimate 67 >60 mL/min/1.73m2    Calcium 8.9 8.2 - 9.6 mg/dL    Glucose 119 (H) 70 - 99 mg/dL   Hepatic panel   Result " Value Ref Range    Protein Total 6.4 6.4 - 8.3 g/dL    Albumin 3.7 3.5 - 5.2 g/dL    Bilirubin Total 0.5 <=1.2 mg/dL    Alkaline Phosphatase 97 40 - 150 U/L    AST 16 0 - 45 U/L    ALT 16 0 - 50 U/L    Bilirubin Direct <0.20 0.00 - 0.30 mg/dL   Result Value Ref Range    Magnesium 2.3 1.7 - 2.3 mg/dL   CBC with platelets   Result Value Ref Range    WBC Count 10.9 4.0 - 11.0 10e3/uL    RBC Count 4.02 3.80 - 5.20 10e6/uL    Hemoglobin 13.1 11.7 - 15.7 g/dL    Hematocrit 39.9 35.0 - 47.0 %    MCV 99 78 - 100 fL    MCH 32.6 26.5 - 33.0 pg    MCHC 32.8 31.5 - 36.5 g/dL    RDW 13.1 10.0 - 15.0 %    Platelet Count 193 150 - 450 10e3/uL   UA with Microscopic reflex to Culture    Specimen: Urine, Clean Catch   Result Value Ref Range    Color Urine Yellow Colorless, Straw, Light Yellow, Yellow    Appearance Urine Slightly Cloudy (A) Clear    Glucose Urine Negative Negative mg/dL    Bilirubin Urine Negative Negative    Ketones Urine Negative Negative mg/dL    Specific Gravity Urine 1.011 1.001 - 1.030    Blood Urine 0.03 mg/dL (A) Negative    pH Urine 6.0 5.0 - 7.0    Protein Albumin Urine Negative Negative mg/dL    Urobilinogen Urine <2.0 <2.0 mg/dL    Nitrite Urine Positive (A) Negative    Leukocyte Esterase Urine 500 Irineo/uL (A) Negative    Bacteria Urine Many (A) None Seen /HPF    WBC Clumps Urine Present (A) None Seen /HPF    RBC Urine 0 <=2 /HPF    WBC Urine 101 (H) <=5 /HPF    Squamous Epithelials Urine <1 <=1 /HPF   Result Value Ref Range    Hemoglobin A1C 5.3 <5.7 %   Result Value Ref Range    Phosphorus 3.6 2.5 - 4.5 mg/dL   TSH with free T4 reflex   Result Value Ref Range    TSH 1.33 0.30 - 4.20 uIU/mL       EKG:  Performed at: 3/30/2024 10:56:33    Impression: Wide QRS rhythm. Rightward axis. Non-specific intra-ventricular conduction block. T wave abnormality, consider inferolateral ischemia.    Rate: 57 BPM  Rhythm: Wide QRS rhythm  Axis: 96  QRS Interval: 130 ms  QTc Interval: 455 ms  ST Changes: T wave  abnormality.  Comparison: No previous ECGs available.    I have independently reviewed and interpreted the EKG(s) documented above.      The creation of this record is based on the scribe s observations of the work being performed by Sapphire Sandoval MD and the provider s statements to them. It was created on her behalf by Imelda Ambrosio, a trained medical scribe. This document has been checked and approved by the attending provider.    Sapphire Sandoval MD  Emergency Medicine  [facility] EMERGENCY DEPARTMENT  [address] [place] [id number]  [phone]  Dept: [phone]     Sapphire Sandoval MD  03/30/24 1851

## 2024-03-31 ENCOUNTER — APPOINTMENT (OUTPATIENT)
Dept: OCCUPATIONAL THERAPY | Facility: HOSPITAL | Age: 89
DRG: 884 | End: 2024-03-31
Attending: STUDENT IN AN ORGANIZED HEALTH CARE EDUCATION/TRAINING PROGRAM
Payer: COMMERCIAL

## 2024-03-31 ENCOUNTER — APPOINTMENT (OUTPATIENT)
Dept: PHYSICAL THERAPY | Facility: HOSPITAL | Age: 89
DRG: 884 | End: 2024-03-31
Attending: STUDENT IN AN ORGANIZED HEALTH CARE EDUCATION/TRAINING PROGRAM
Payer: COMMERCIAL

## 2024-03-31 LAB
ALBUMIN SERPL BCG-MCNC: 3.4 G/DL (ref 3.5–5.2)
ALP SERPL-CCNC: 90 U/L (ref 40–150)
ALT SERPL W P-5'-P-CCNC: 17 U/L (ref 0–50)
ANION GAP SERPL CALCULATED.3IONS-SCNC: 9 MMOL/L (ref 7–15)
AST SERPL W P-5'-P-CCNC: 18 U/L (ref 0–45)
ATRIAL RATE - MUSE: 288 BPM
BACTERIA UR CULT: NORMAL
BILIRUB SERPL-MCNC: 0.4 MG/DL
BUN SERPL-MCNC: 5.2 MG/DL (ref 8–23)
CALCIUM SERPL-MCNC: 8.7 MG/DL (ref 8.2–9.6)
CHLORIDE SERPL-SCNC: 108 MMOL/L (ref 98–107)
CREAT SERPL-MCNC: 0.78 MG/DL (ref 0.51–0.95)
DEPRECATED HCO3 PLAS-SCNC: 25 MMOL/L (ref 22–29)
DIASTOLIC BLOOD PRESSURE - MUSE: 58 MMHG
EGFRCR SERPLBLD CKD-EPI 2021: 72 ML/MIN/1.73M2
ERYTHROCYTE [DISTWIDTH] IN BLOOD BY AUTOMATED COUNT: 13 % (ref 10–15)
GLUCOSE BLDC GLUCOMTR-MCNC: 114 MG/DL (ref 70–99)
GLUCOSE BLDC GLUCOMTR-MCNC: 117 MG/DL (ref 70–99)
GLUCOSE SERPL-MCNC: 90 MG/DL (ref 70–99)
HCT VFR BLD AUTO: 36.4 % (ref 35–47)
HGB BLD-MCNC: 11.8 G/DL (ref 11.7–15.7)
INTERPRETATION ECG - MUSE: NORMAL
MAGNESIUM SERPL-MCNC: 2.1 MG/DL (ref 1.7–2.3)
MCH RBC QN AUTO: 32 PG (ref 26.5–33)
MCHC RBC AUTO-ENTMCNC: 32.4 G/DL (ref 31.5–36.5)
MCV RBC AUTO: 99 FL (ref 78–100)
P AXIS - MUSE: NORMAL DEGREES
PHOSPHATE SERPL-MCNC: 2.9 MG/DL (ref 2.5–4.5)
PLATELET # BLD AUTO: 186 10E3/UL (ref 150–450)
POTASSIUM SERPL-SCNC: 4.1 MMOL/L (ref 3.4–5.3)
PR INTERVAL - MUSE: NORMAL MS
PROT SERPL-MCNC: 5.7 G/DL (ref 6.4–8.3)
QRS DURATION - MUSE: 130 MS
QT - MUSE: 468 MS
QTC - MUSE: 455 MS
R AXIS - MUSE: 96 DEGREES
RBC # BLD AUTO: 3.69 10E6/UL (ref 3.8–5.2)
SODIUM SERPL-SCNC: 142 MMOL/L (ref 135–145)
SYSTOLIC BLOOD PRESSURE - MUSE: 130 MMHG
T AXIS - MUSE: -16 DEGREES
VENTRICULAR RATE- MUSE: 57 BPM
WBC # BLD AUTO: 13.2 10E3/UL (ref 4–11)

## 2024-03-31 PROCEDURE — 97165 OT EVAL LOW COMPLEX 30 MIN: CPT | Mod: GO

## 2024-03-31 PROCEDURE — 85027 COMPLETE CBC AUTOMATED: CPT | Performed by: STUDENT IN AN ORGANIZED HEALTH CARE EDUCATION/TRAINING PROGRAM

## 2024-03-31 PROCEDURE — 250N000013 HC RX MED GY IP 250 OP 250 PS 637

## 2024-03-31 PROCEDURE — 84100 ASSAY OF PHOSPHORUS: CPT | Performed by: STUDENT IN AN ORGANIZED HEALTH CARE EDUCATION/TRAINING PROGRAM

## 2024-03-31 PROCEDURE — 250N000011 HC RX IP 250 OP 636: Performed by: INTERNAL MEDICINE

## 2024-03-31 PROCEDURE — 80053 COMPREHEN METABOLIC PANEL: CPT | Performed by: STUDENT IN AN ORGANIZED HEALTH CARE EDUCATION/TRAINING PROGRAM

## 2024-03-31 PROCEDURE — 120N000001 HC R&B MED SURG/OB

## 2024-03-31 PROCEDURE — 250N000011 HC RX IP 250 OP 636: Performed by: STUDENT IN AN ORGANIZED HEALTH CARE EDUCATION/TRAINING PROGRAM

## 2024-03-31 PROCEDURE — 83735 ASSAY OF MAGNESIUM: CPT | Performed by: STUDENT IN AN ORGANIZED HEALTH CARE EDUCATION/TRAINING PROGRAM

## 2024-03-31 PROCEDURE — 36415 COLL VENOUS BLD VENIPUNCTURE: CPT | Performed by: STUDENT IN AN ORGANIZED HEALTH CARE EDUCATION/TRAINING PROGRAM

## 2024-03-31 PROCEDURE — 99232 SBSQ HOSP IP/OBS MODERATE 35: CPT | Performed by: INTERNAL MEDICINE

## 2024-03-31 PROCEDURE — 97162 PT EVAL MOD COMPLEX 30 MIN: CPT | Mod: GP

## 2024-03-31 PROCEDURE — 250N000013 HC RX MED GY IP 250 OP 250 PS 637: Performed by: INTERNAL MEDICINE

## 2024-03-31 PROCEDURE — 258N000003 HC RX IP 258 OP 636: Performed by: STUDENT IN AN ORGANIZED HEALTH CARE EDUCATION/TRAINING PROGRAM

## 2024-03-31 PROCEDURE — 250N000013 HC RX MED GY IP 250 OP 250 PS 637: Performed by: STUDENT IN AN ORGANIZED HEALTH CARE EDUCATION/TRAINING PROGRAM

## 2024-03-31 RX ORDER — ENOXAPARIN SODIUM 100 MG/ML
40 INJECTION SUBCUTANEOUS EVERY 24 HOURS
Status: DISCONTINUED | OUTPATIENT
Start: 2024-03-31 | End: 2024-04-15 | Stop reason: HOSPADM

## 2024-03-31 RX ORDER — TRAZODONE HYDROCHLORIDE 50 MG/1
50 TABLET, FILM COATED ORAL AT BEDTIME
Status: COMPLETED | OUTPATIENT
Start: 2024-03-31 | End: 2024-03-31

## 2024-03-31 RX ORDER — QUETIAPINE FUMARATE 25 MG/1
25 TABLET, FILM COATED ORAL
Status: DISPENSED | OUTPATIENT
Start: 2024-03-31 | End: 2024-03-31

## 2024-03-31 RX ORDER — OLANZAPINE 10 MG/2ML
5 INJECTION, POWDER, FOR SOLUTION INTRAMUSCULAR 3 TIMES DAILY PRN
Status: DISCONTINUED | OUTPATIENT
Start: 2024-03-31 | End: 2024-04-03

## 2024-03-31 RX ORDER — TRAZODONE HYDROCHLORIDE 50 MG/1
50 TABLET, FILM COATED ORAL AT BEDTIME
Status: DISCONTINUED | OUTPATIENT
Start: 2024-03-31 | End: 2024-03-31

## 2024-03-31 RX ADMIN — GUAIFENESIN 200 MG: 200 SOLUTION ORAL at 13:13

## 2024-03-31 RX ADMIN — DEXTROSE AND SODIUM CHLORIDE: 5; 450 INJECTION, SOLUTION INTRAVENOUS at 09:54

## 2024-03-31 RX ADMIN — ONDANSETRON 4 MG: 2 INJECTION INTRAMUSCULAR; INTRAVENOUS at 09:45

## 2024-03-31 RX ADMIN — OLANZAPINE 5 MG: 10 INJECTION, POWDER, FOR SOLUTION INTRAMUSCULAR at 09:45

## 2024-03-31 RX ADMIN — ACETAMINOPHEN 1000 MG: 500 TABLET ORAL at 16:29

## 2024-03-31 RX ADMIN — QUETIAPINE FUMARATE 12.5 MG: 25 TABLET ORAL at 16:29

## 2024-03-31 RX ADMIN — ACETAMINOPHEN 1000 MG: 500 TABLET ORAL at 01:22

## 2024-03-31 RX ADMIN — QUETIAPINE FUMARATE 25 MG: 25 TABLET ORAL at 05:55

## 2024-03-31 RX ADMIN — GUAIFENESIN 200 MG: 200 SOLUTION ORAL at 03:38

## 2024-03-31 RX ADMIN — CEFTRIAXONE SODIUM 1 G: 1 INJECTION, POWDER, FOR SOLUTION INTRAMUSCULAR; INTRAVENOUS at 12:17

## 2024-03-31 RX ADMIN — SODIUM CHLORIDE: 9 INJECTION, SOLUTION INTRAVENOUS at 03:15

## 2024-03-31 ASSESSMENT — ACTIVITIES OF DAILY LIVING (ADL)
ADLS_ACUITY_SCORE: 43
ADLS_ACUITY_SCORE: 44
ADLS_ACUITY_SCORE: 44
ADLS_ACUITY_SCORE: 43
ADLS_ACUITY_SCORE: 41
ADLS_ACUITY_SCORE: 44
ADLS_ACUITY_SCORE: 43
ADLS_ACUITY_SCORE: 44
ADLS_ACUITY_SCORE: 43
ADLS_ACUITY_SCORE: 44
ADLS_ACUITY_SCORE: 43
ADLS_ACUITY_SCORE: 43
ADLS_ACUITY_SCORE: 44
ADLS_ACUITY_SCORE: 41
ADLS_ACUITY_SCORE: 43
ADLS_ACUITY_SCORE: 43
ADLS_ACUITY_SCORE: 44
ADLS_ACUITY_SCORE: 43
ADLS_ACUITY_SCORE: 44

## 2024-03-31 NOTE — PROGRESS NOTES
Pt new admit from ED, is covid positive. Came up with two daughters at bedside. Pt was calm and cooperative while daughters were visiting. Pt is alert, oriented to self only. Pt kept repeating that she wanted to go home with her daughters. Writer and daughters explained that pt will have to stay in the hospital tonight. Once daughters left, pt received dinner and did not eat much of it. PRN seroquel order obtained and given. Pt then asked when she's going home. Reiterated that pt is staying in the hospital tonight, pt very forgetful. Offered to take pt back to bed, agreed. Pt set off bed alarm and was insistent on going home. Stood up and was asking staff to let her leave. CNA called daughter for pt which did not seem to help pt settle down. Pt got increasingly agitated and kept asking to leave to go home. Charge RN notified, CNA stayed in the room as a sitter. This seem to help pt. Pt can be redirectable and distracted with conversation when in the room. But if staff are not there pt will get out of bed and want to leave. Sitter continued to be in the room until the end of shift. Pt was more calm and rested in the bed but was having hard time sleeping.

## 2024-03-31 NOTE — PROGRESS NOTES
03/31/24 1401   Appointment Info   Signing Clinician's Name / Credentials (OT) Shyanne E cm SOW/L   Living Environment   People in Home child(cristino), adult   Current Living Arrangements house   Living Environment Comments Pt is not a reliable historian   Self-Care   Current Activity Tolerance moderate   Equipment Currently Used at Home cane, straight   General Information   Onset of Illness/Injury or Date of Surgery 03/30/24   Referring Physician Gondal   Patient/Family Therapy Goal Statement (OT) home   Additional Occupational Profile Info/Pertinent History of Current Problem Pt admitted with Covid 19, dementia   Existing Precautions/Restrictions fall;other (see comments)  (special)   Cognitive Status Examination   Cognitive Status Comments Impaired, poor safety awareness, paranoid at times   Visual Perception   Visual Impairment/Limitations WFL   Sensory   Sensory Quick Adds sensation intact   Range of Motion Comprehensive   General Range of Motion no range of motion deficits identified   Strength Comprehensive (MMT)   General Manual Muscle Testing (MMT) Assessment no strength deficits identified   Transfers   Transfer Comments CGA with cane   Balance   Balance Comments CGA   Activities of Daily Living   BADL Assessment/Intervention   (Decllined ADLs)   Clinical Impression   Criteria for Skilled Therapeutic Interventions Met (OT) Evaluation only   OT Diagnosis Impaired independence with ADLs   OT Problem List-Impairments impacting ADL balance;cognition;mobility   Assessment of Occupational Performance 1-3 Performance Deficits   Risk & Benefits of therapy have been explained   (Pt unable to understand recommendations due to cognition)   Clinical Impression Comments Pt seen bedside for OT eval.  Pt demonstrates decreased independence with ADLs and mobility due to impaired balance and cognition.  Pt not appropriate for skilled therapy as she is not able to participate in meaningful therapy and does not  demonstrate ability to learn new information or carry over new information.  Recommend 24 hour supervision and assist at home or Memory Car/LTC, pending ability of family to care for pt.   OT Total Evaluation Time   OT Eval, Low Complexity Minutes (06227) 15   OT Discharge Planning   OT Plan Eval only   OT Discharge Recommendation (DC Rec) other (see comments)   OT Rationale for DC Rec Recommend 24 hour supervision and assist at home or Memory Care/LTC, pending families ability to provide 24 hour care.   OT Brief overview of current status CGA

## 2024-03-31 NOTE — PLAN OF CARE
"  Problem: Adult Inpatient Plan of Care  Goal: Plan of Care Review  Description: The Plan of Care Review/Shift note should be completed every shift.  The Outcome Evaluation is a brief statement about your assessment that the patient is improving, declining, or no change.  This information will be displayed automatically on your shift  note.  Outcome: Progressing  Goal: Patient-Specific Goal (Individualized)  Description: You can add care plan individualizations to a care plan. Examples of Individualization might be:  \"Parent requests to be called daily at 9am for status\", \"I have a hard time hearing out of my right ear\", or \"Do not touch me to wake me up as it startles  me\".  Outcome: Progressing  Goal: Absence of Hospital-Acquired Illness or Injury  Outcome: Progressing  Intervention: Identify and Manage Fall Risk  Recent Flowsheet Documentation  Taken 3/30/2024 2353 by Lan Sellers RN  Safety Promotion/Fall Prevention:   activity supervised   assistive device/personal items within reach  Intervention: Prevent Infection  Recent Flowsheet Documentation  Taken 3/30/2024 2353 by Lan Sellers RN  Infection Prevention: hand hygiene promoted  Goal: Optimal Comfort and Wellbeing  Outcome: Progressing  Goal: Readiness for Transition of Care  Outcome: Progressing     Problem: Risk for Delirium  Goal: Optimal Coping  Outcome: Progressing  Goal: Improved Behavioral Control  Outcome: Progressing  Intervention: Minimize Safety Risk  Recent Flowsheet Documentation  Taken 3/30/2024 2353 by Lan Sellers RN  Enhanced Safety Measures: assistive devices when indicated  Goal: Improved Attention and Thought Clarity  Outcome: Progressing  Goal: Improved Sleep  Outcome: Progressing   Goal Outcome Evaluation:             Pt is alert and oriented to self, stay awake all night, refused sleeping medication, pt was pulling the lines and trying to go home. 1:1 for safety at the bedside. VSS , took this morning a Seroquel " and currently laying in the bed quietly.

## 2024-03-31 NOTE — PROGRESS NOTES
[facility]    Medicine Progress Note - Hospitalist Service    Date of Admission:  3/30/2024    Assessment & Plan     Mariela Ortiz is a 89 year old female admitted on 3/30/2024. She  presented to the ER with complaint of flulike symptoms for the past 3 weeks associated with cough, generalized weakness, poor appetite and oral intake, decreased mobility and diarrhea. Patient received ceftriaxone in the ED and is going to be admitted for generalized weakness secondary to dehydration requiring fluid resuscitation secondary to poor oral intake, acute on chronic diarrhea to rule out acute infectious etiology, and UTI.    Generalized weakness due to covid and dehydration  Failure to thrive   COVID-19 infection   UTI  Acute on chronic diarrhea possibly in setting of viral illness  - flulike symptoms for the past 3 weeks associated with cough, generalized weakness, poor appetite and oral intake, decreased mobility and diarrhea  -Family stated that they initially brought her to the ER 3 days ago at which time during the waiting process she got agitated and they brought her home but since then the symptoms have not improved and progressively worsened  -3 days ago tested for COVID positive   -Urinalysis with positive nitrates, leukocyte esterase, pyuria and bacteriuria suspicious for UTI, urine cultures pending  -Patient received ceftriaxone in the ED will continue for now  -X-ray did not show any acute process other than some atelectasis and patient did not have any pulmonary complaints  -No need for treatment for COVID for now as patient does not appear to be hypoxic and does not have any pulmonary complaints other than cough Currently holding loperamide as ruling out GI bacteria and viruses, if no infectious etiology is found can restart loperamide  -Continue gentle IV hydration    Chronic lymphocytic leukemia  -follows with hematology once a year and has not required any treatment in the past is  undergoing surveillance by her oncologist    Cognitive impairment with dementia  Metabolic encephalopathy -start on Seroquel and Zyprexia prn  Monitor mental status closely  Appears mostly at baseline  Consider sitter if needed  Try to avoid sedating medication as at risk of delirium    Mild hyperglycemia most likely reactive  Hemoglobin A1c 5.3  Monitor blood sugar levels intermittently    Physical deconditioning/weakness  PT and OT and case management consulted for placement  Fall precautions    History of hypothyroidism  Continue with levothyroxine  Follow-up TSH    History of hypertension  Continue with lisinopril  Monitor vital signs as per protocol        Diet: Combination Diet Regular Diet Adult    DVT Prophylaxis: Enoxaparin (Lovenox) subcutaneous; high risk with covid  Zaman Catheter: Not present  Lines: None     Cardiac Monitoring: None  Code Status: Full Code  as per discussion with the patients family, as per patient's daughter patient does want to be resuscitated but if she goes into a vegetative state does not want to end up on a ventilator    Clinically Significant Risk Factors Present on Admission              # Hypoalbuminemia: Lowest albumin = 3.4 g/dL at 3/31/2024  5:17 AM, will monitor as appropriate     # Hypertension: Noted on problem list   # Dementia: noted on problem list               Disposition Plan     Expected Discharge Date: 04/01/2024                Pt/ot/sw for placement. Family are interested in TCU    Tee Birch MD  Hospitalist Service  [facility]  Securely message with [employer] (more info)  Text page via [employer] Paging/Directory   ______________________________________________________________________    Interval History   Intermittent confusion and agitation, no cp/sob, no n/v, no f/c    Physical Exam   Vital Signs: Temp: 98.1  F (36.7  C) Temp src: Oral BP: (!) 141/72 Pulse: 69   Resp: 18 SpO2: 98 % O2 Device: None (Room air)    Weight: 0 lbs 0  oz    General.  Awake not in acute distress.  HEENT.  Pupils equal round react to light, anicteric, EOM intact.  Neck supple no JVD.  CVS regular rhythm no murmur gallops.  Lungs.  Clear to auscultation bilateral no wheezing or rales.  Abdomen.  Soft nontender bowel sounds present.  Extremities.  No edema no calf tenderness.  Neurological.  Awake and alert. No focal deficit.General weakness  Skin no rash. No pallor.  Psych. Impaired memory      Medical Decision Making       46 MINUTES SPENT BY ME on the date of service doing chart review, history, exam, documentation & further activities per the note.      Data     I have personally reviewed the following data over the past 24 hrs:    13.2 (H)  \   11.8   / 186     142 108 (H) 5.2 (L) /  90   4.1 25 0.78 \     ALT: 17 AST: 18 AP: 90 TBILI: 0.4   ALB: 3.4 (L) TOT PROTEIN: 5.7 (L) LIPASE: N/A     TSH: N/A T4: N/A A1C: N/A       Imaging results reviewed over the past 24 hrs:   No results found for this or any previous visit (from the past 24 hour(s)).

## 2024-03-31 NOTE — PLAN OF CARE
Problem: Adult Inpatient Plan of Care  Goal: Optimal Comfort and Wellbeing  Outcome: Progressing  Intervention: Provide Person-Centered Care  Recent Flowsheet Documentation  Taken 3/31/2024 1135 by Arabella Treviño RN  Trust Relationship/Rapport: reassurance provided     Problem: Risk for Delirium  Goal: Improved Behavioral Control  Outcome: Progressing  Intervention: Prevent and Manage Agitation  Recent Flowsheet Documentation  Taken 3/31/2024 1230 by Arabella Treviño RN  Environment Familiarity/Consistency: daily routine followed  Taken 3/31/2024 1135 by Arabella Treviño RN  Environment Familiarity/Consistency: daily routine followed  Intervention: Minimize Safety Risk  Recent Flowsheet Documentation  Taken 3/31/2024 1230 by Arabella Treviño RN  Communication Enhancement Strategies: extra time allowed for response  Taken 3/31/2024 1135 by Arabella Treviño RN  Trust Relationship/Rapport: reassurance provided   Goal Outcome Evaluation:       Pt is only alert to self, has been very restless and agitated all shift long , trying to leave the room and go home, denied pain, has been having a frequent cough, pt requested a cough syrup that was administered, but pt refused all other meds. It took 3 person to hold pt down and administer IM Zyprexa which did not help with agitation, pt was constantly pacing in the room and trying to leave. Pt has 5%dextrose and half NS running at 75ml/hr, complained of nausea in the morning, had IV Zofran given, [pt refused to eat any food, saying she does not trust anyone except herself, BP was high at 179 systolic, recheck was 129/59, refused BS checks, sitter at bedside.

## 2024-03-31 NOTE — PROGRESS NOTES
Physical Therapy        03/31/24 1400   Appointment Info   Signing Clinician's Name / Credentials (PT) Carlyn Gonsalez DPT   Living Environment   People in Home child(cristino), adult   Current Living Arrangements house   Home Accessibility stairs to enter home   Number of Stairs, Main Entrance 5   Stair Railings, Main Entrance railings not in good condition, need repair for safe use   Living Environment Comments pt reports she has a tub/shower on the main floor, stall in the basement   Self-Care   Equipment Currently Used at Home cane, straight;walker, rolling   General Information   Onset of Illness/Injury or Date of Surgery 03/31/24   Referring Physician Gondal, Saad J, MD   Patient/Family Therapy Goals Statement (PT) go home   Pertinent History of Current Problem (include personal factors and/or comorbidities that impact the POC) 89 year old female admitted on 3/30/2024. She  presented to the ER with complaint of flulike symptoms for the past 3 weeks associated with cough, generalized weakness, poor appetite and oral intake, decreased mobility and diarrhea. Patient received ceftriaxone in the ED and is going to be admitted for generalized weakness secondary to dehydration requiring fluid resuscitation secondary to poor oral intake, acute on chronic diarrhea to rule out acute infectious etiology, and UTI.   Existing Precautions/Restrictions fall   Cognition   Affect/Mental Status (Cognition) confused   Orientation Status (Cognition) oriented to;person;disoriented to;place;situation;time   Follows Commands (Cognition) follows one-step commands;0-24% accuracy   Behavioral Issues verbal outbursts;disinhibition   Safety Deficit (Cognition) impulsivity;severe deficit;judgment;problem-solving   Pain Assessment   Patient Currently in Pain No   Integumentary/Edema   Integumentary/Edema no deficits were identifed   Posture    Posture Forward head position   Range of Motion (ROM)   Range of Motion ROM is WFL   Strength (Manual  Muscle Testing)   Strength (Manual Muscle Testing) strength is WFL   Bed Mobility   Comment, (Bed Mobility) pt seated EOB before and after PT, did not lie down   Transfers   Comment, (Transfers) sit<>stand, CGA without AD   Gait/Stairs (Locomotion)   Comment, (Gait/Stairs) pt impulsive, flexed posture, reaching for furniture and other surroundings both with and without cane.   Sensory Examination   Sensory Perception patient reports no sensory changes   Coordination   Coordination no deficits were identified   Muscle Tone   Muscle Tone no deficits were identified   Clinical Impression   Criteria for Skilled Therapeutic Intervention Yes, treatment indicated   PT Diagnosis (PT) gait instability   Influenced by the following impairments impaired balance and safety awareness   Functional limitations due to impairments high fall risk   Clinical Presentation (PT Evaluation Complexity) stable   Clinical Presentation Rationale presents as medically diagnosed.   Clinical Decision Making (Complexity) moderate complexity   Planned Therapy Interventions (PT) balance training;gait training;neuromuscular re-education;patient/family education;stair training;strengthening;transfer training;progressive activity/exercise   PT Total Evaluation Time   PT Eval, Moderate Complexity Minutes (34335) 16   Physical Therapy Goals   PT Frequency 3x/week   PT Predicted Duration/Target Date for Goal Attainment 04/08/24   PT Goals Transfers;Gait;Stairs   PT: Transfers Independent;Sit to/from stand   PT: Gait Modified independent;Assistive device;100 feet   PT Discharge Planning   PT Plan amb in room with cane, balance, posture, safety   PT Discharge Recommendation (DC Rec) Long term care facility;home with assist   PT Rationale for DC Rec pt's strength is ok, but her cognition limits her from meaningful participation in therapy. Will need 24 hour supervision.   PT Brief overview of current status CGA/SBA with basic functional mobility.   Total  Session Time   Total Session Time (sum of timed and untimed services) 16         Carlyn Gonsalez, DPT 3/31/2024

## 2024-04-01 ENCOUNTER — APPOINTMENT (OUTPATIENT)
Dept: PHYSICAL THERAPY | Facility: HOSPITAL | Age: 89
DRG: 884 | End: 2024-04-01
Payer: COMMERCIAL

## 2024-04-01 LAB
ERYTHROCYTE [DISTWIDTH] IN BLOOD BY AUTOMATED COUNT: 13 % (ref 10–15)
GLUCOSE BLDC GLUCOMTR-MCNC: 114 MG/DL (ref 70–99)
HCT VFR BLD AUTO: 37.6 % (ref 35–47)
HGB BLD-MCNC: 11.9 G/DL (ref 11.7–15.7)
HOLD SPECIMEN: NORMAL
MCH RBC QN AUTO: 31.6 PG (ref 26.5–33)
MCHC RBC AUTO-ENTMCNC: 31.6 G/DL (ref 31.5–36.5)
MCV RBC AUTO: 100 FL (ref 78–100)
PLATELET # BLD AUTO: 180 10E3/UL (ref 150–450)
RBC # BLD AUTO: 3.76 10E6/UL (ref 3.8–5.2)
WBC # BLD AUTO: 13.2 10E3/UL (ref 4–11)

## 2024-04-01 PROCEDURE — 97110 THERAPEUTIC EXERCISES: CPT | Mod: GP

## 2024-04-01 PROCEDURE — 36415 COLL VENOUS BLD VENIPUNCTURE: CPT | Performed by: INTERNAL MEDICINE

## 2024-04-01 PROCEDURE — 99232 SBSQ HOSP IP/OBS MODERATE 35: CPT | Performed by: INTERNAL MEDICINE

## 2024-04-01 PROCEDURE — 250N000013 HC RX MED GY IP 250 OP 250 PS 637: Performed by: INTERNAL MEDICINE

## 2024-04-01 PROCEDURE — 250N000011 HC RX IP 250 OP 636: Performed by: INTERNAL MEDICINE

## 2024-04-01 PROCEDURE — 120N000001 HC R&B MED SURG/OB

## 2024-04-01 PROCEDURE — 97116 GAIT TRAINING THERAPY: CPT | Mod: GP

## 2024-04-01 PROCEDURE — 250N000013 HC RX MED GY IP 250 OP 250 PS 637: Performed by: STUDENT IN AN ORGANIZED HEALTH CARE EDUCATION/TRAINING PROGRAM

## 2024-04-01 PROCEDURE — 85014 HEMATOCRIT: CPT | Performed by: INTERNAL MEDICINE

## 2024-04-01 RX ORDER — CEFUROXIME AXETIL 250 MG/1
250 TABLET ORAL EVERY 12 HOURS SCHEDULED
Qty: 10 TABLET | Refills: 0 | Status: COMPLETED | OUTPATIENT
Start: 2024-04-01 | End: 2024-04-05

## 2024-04-01 RX ORDER — TRAZODONE HYDROCHLORIDE 50 MG/1
50 TABLET, FILM COATED ORAL
Status: DISCONTINUED | OUTPATIENT
Start: 2024-04-01 | End: 2024-04-03

## 2024-04-01 RX ORDER — LANOLIN ALCOHOL/MO/W.PET/CERES
3 CREAM (GRAM) TOPICAL
Status: DISCONTINUED | OUTPATIENT
Start: 2024-04-01 | End: 2024-04-15 | Stop reason: HOSPADM

## 2024-04-01 RX ORDER — NICOTINE 21 MG/24HR
1 PATCH, TRANSDERMAL 24 HOURS TRANSDERMAL DAILY
Status: DISCONTINUED | OUTPATIENT
Start: 2024-04-01 | End: 2024-04-03

## 2024-04-01 RX ADMIN — CEFUROXIME AXETIL 250 MG: 250 TABLET ORAL at 22:19

## 2024-04-01 RX ADMIN — ACETAMINOPHEN 1000 MG: 500 TABLET ORAL at 16:43

## 2024-04-01 RX ADMIN — ACETAMINOPHEN 1000 MG: 500 TABLET ORAL at 03:31

## 2024-04-01 RX ADMIN — LEVOTHYROXINE SODIUM 100 MCG: 100 TABLET ORAL at 09:02

## 2024-04-01 RX ADMIN — LISINOPRIL 5 MG: 5 TABLET ORAL at 09:02

## 2024-04-01 RX ADMIN — QUETIAPINE FUMARATE 12.5 MG: 25 TABLET ORAL at 01:10

## 2024-04-01 RX ADMIN — CYANOCOBALAMIN TAB 1000 MCG 1000 MCG: 1000 TAB at 09:02

## 2024-04-01 RX ADMIN — CEFUROXIME AXETIL 250 MG: 250 TABLET ORAL at 12:10

## 2024-04-01 RX ADMIN — QUETIAPINE FUMARATE 12.5 MG: 25 TABLET ORAL at 16:43

## 2024-04-01 ASSESSMENT — ACTIVITIES OF DAILY LIVING (ADL)
ADLS_ACUITY_SCORE: 39
ADLS_ACUITY_SCORE: 41
ADLS_ACUITY_SCORE: 41
ADLS_ACUITY_SCORE: 39
ADLS_ACUITY_SCORE: 41
ADLS_ACUITY_SCORE: 39
ADLS_ACUITY_SCORE: 41
ADLS_ACUITY_SCORE: 39
ADLS_ACUITY_SCORE: 41
ADLS_ACUITY_SCORE: 39
ADLS_ACUITY_SCORE: 41

## 2024-04-01 NOTE — PLAN OF CARE
Problem: Adult Inpatient Plan of Care  Goal: Plan of Care Review  Description: The Plan of Care Review/Shift note should be completed every shift.  The Outcome Evaluation is a brief statement about your assessment that the patient is improving, declining, or no change.  This information will be displayed automatically on your shift  note.  Outcome: Progressing  Flowsheets (Taken 4/1/2024 1119)  Plan of Care Reviewed With: patient   Goal Outcome Evaluation:plan to go home when ready  Problem: Infection  Goal: Absence of Infection Signs and Symptoms  Outcome: Progressing  Intervention: Prevent or Manage Infection  Recent Flowsheet Documentation  Taken 4/1/2024 0930 by Lesa Huff, RN  Isolation Precautions:   airborne precautions maintained   special precautions maintained-patient on room air and slight cough  Problem: Comorbidity Management  Goal: Blood Pressure in Desired Range  Outcome: Progressing-monitoring every 8 hours    Plan of Care Reviewed With: patient alert to self - able to make needs known.  Uses cane when ambulating to bathroom.  On regular diet.  On room air.  Taking oral antibiotics                  clear to auscultation bilaterally/no wheezes/no rales/no rhonchi/no respiratory distress

## 2024-04-01 NOTE — PLAN OF CARE
Problem: Adult Inpatient Plan of Care  Goal: Absence of Hospital-Acquired Illness or Injury  Intervention: Identify and Manage Fall Risk  Recent Flowsheet Documentation  Taken 3/31/2024 1607 by Elieser Ramirez RN  Safety Promotion/Fall Prevention:   bedside attendant   lighting adjusted   nonskid shoes/slippers when out of bed   patient and family education     Problem: Pain Acute  Goal: Optimal Pain Control and Function  Intervention: Prevent or Manage Pain  Recent Flowsheet Documentation  Taken 3/31/2024 1607 by Elieser Ramirez, RN  Medication Review/Management: medications reviewed     Problem: Infection  Goal: Absence of Infection Signs and Symptoms  Intervention: Prevent or Manage Infection  Recent Flowsheet Documentation  Taken 3/31/2024 1607 by Elieser Ramirez, RN  Isolation Precautions: special precautions maintained   Goal Outcome Evaluation:         Pt very restless at start of shift. Pt pulling IV and getting tangled up in it, walking around in circles wanting to leave. 1:1 staff unable to control pt. IV was found to be leaking and was removed due to swelling at insertion site. Pt met order parameters to stop IV fluids. So, IV was left out due to restlessness. It will need to be replaced tomorrow prior to Noon dose of antibiotic. Pt c/o neck and back pain and agreed to take tylenol. Pt also took a prn seroquel. Seroquel was not effective in calming pt. Family here visiting pt and requested PRN sleep medication as it was reported to them that pt is not sleeping. Left message for MD to request.

## 2024-04-01 NOTE — PROGRESS NOTES
Care Management Follow Up    Length of Stay (days): 2    Expected Discharge Date: 04/01/2024     Concerns to be Addressed: discharge planning     Patient plan of care discussed at interdisciplinary rounds: Yes    Anticipated Discharge Disposition:       Anticipated Discharge Services:    Anticipated Discharge DME:      Patient/family educated on Medicare website which has current facility and service quality ratings:  yes  Education Provided on the Discharge Plan:    Patient/Family in Agreement with the Plan:      Referrals Placed by CM/SW:    Private pay costs discussed: Not applicable    Additional Information:  SW intern called pt daughter Stephanie to discuss PT/OT recommendation of Long Term Care or home with 24 hour supervision due to cognitive concerns. Stephanie stated that the goal is to return home and would be agreeable to TCU, but is not sure that she wants Long Term Care. Stephanie stated that she is visiting pt today, DIANE stated that she could speak to a physical therapist to discuss the recommendation further. DIANE updated PT with request.    [facility]   4/1/2024

## 2024-04-01 NOTE — PLAN OF CARE
"  Problem: Adult Inpatient Plan of Care  Goal: Plan of Care Review  Description: The Plan of Care Review/Shift note should be completed every shift.  The Outcome Evaluation is a brief statement about your assessment that the patient is improving, declining, or no change.  This information will be displayed automatically on your shift  note.  Outcome: Progressing     Problem: Risk for Delirium  Goal: Improved Sleep  Outcome: Progressing     Problem: Pain Acute  Goal: Optimal Pain Control and Function  Outcome: Progressing   Goal Outcome Evaluation:       Pt is alert and oriented with confusion to place. VSS. Irritable and frustrated. Infrequent coughing heard. Explained her situation and she calmed down. 1:1 attendant. Pt had episodes of coming out of bed walking around very unstable. Pt saying \" I hate this place I would rather go to [place]. I need to drink my beer, I wanna smoke.\"Redirected and explained she had COVID, cooperates at times. No c/op pain. Given Quetiapine PRN at 0110AM, slept maybe 3 hrs. Drink sips of water.Woke up at 4AM and sitting at side of bed.                 "

## 2024-04-01 NOTE — PLAN OF CARE
"Goal Outcome Evaluation:             PRIMARY DIAGNOSIS: \"GENERIC\" NURSING  OUTPATIENT/OBSERVATION GOALS TO BE MET BEFORE DISCHARGE:  ADLs back to baseline: No    Activity and level of assistance: Up with standby assistance.    Pain status: Pain free.    Return to near baseline physical activity: Yes     Discharge Planner Nurse   Safe discharge environment identified: No  Barriers to discharge: Yes       Entered by: oZ Beltrán RN 04/01/2024 6:14 AM     Please review provider order for any additional goals.   Nurse to notify provider when observation goals have been met and patient is ready for discharge.           "

## 2024-04-01 NOTE — PLAN OF CARE
Problem: Risk for Delirium  Goal: Improved Behavioral Control  Outcome: Progressing  Intervention: Minimize Safety Risk  Recent Flowsheet Documentation  Taken 4/1/2024 1642 by Elieser Ramirez RN  Enhanced Safety Measures: pain management     Problem: Confusion Acute  Goal: Optimal Cognitive Function  Outcome: Progressing   Goal Outcome Evaluation:       Pt confused and restless  this shift. Pt walked out of room and tried to walk into other pts rooms. Pt is disoriented to place, time and situation. Walked pt back to room. Pt with 1:1 staff for safety. Administered tylenol for chronic back and neck pain and prn seroquel for anxiety. The seroquel has not helped. Pt is confused, agitated and wants to leave. Pt refused blood sugar check. Will reapproach pt. Gave re-orientation, frequent redirection, reassurance, tried lavender oil and massage to help pt relax. Enc fluid and food intake. Ordered dinner for pt.

## 2024-04-01 NOTE — PROGRESS NOTES
CLINICAL NUTRITION SERVICES - ASSESSMENT NOTE     Nutrition Prescription    RECOMMENDATIONS FOR MDs/PROVIDERS TO ORDER:  Recommend MVI/M daily r/t inadequate po x 3 weeks     Malnutrition Status:    Unable to determine due to need for NFPE, covid+ isolation precautions     Recommendations already ordered by Registered Dietitian (RD):  ONS- Ensure enlive BID at Breakfast and Dinner- Strawberry and chocolate    Future/Additional Recommendations:  Monitor po intake, sup iraida., wt, BM.      REASON FOR ASSESSMENT  Mariela Ortiz is a/an 89 year old female assessed by the dietitian for Admission Nutrition Risk Screen for unsure weight loss, decreased appetite.     HPI: Pt with complaint of flulike symptoms for the past 3 weeks associated with cough, generalized weakness, poor appetite and oral intake, decreased mobility and diarrhea. Patient received ceftriaxone in the ED and is going to be admitted for generalized weakness secondary to dehydration requiring fluid resuscitation secondary to poor oral intake, acute on chronic diarrhea to rule out acute infectious etiology, and UTI.     NUTRITION HISTORY  Talked with pt daughter- Judith this afternoon, reports pt with decreased oral intakes x3 weeks. Reports pt lost interest in eating and takes a couple bites of food at meals but it has been getting slightly better now since admit. Pt dtr reports pt likes toast with butter and jelly, bellamy, mac n cheese, cranberry juice. Pt dislikes meat. Pt likes eggs occasionally if well-done. Pt drinks x1 Boost/day in the AM at home, likes strawberry and chocolate. Pt dtr notes pt would drink supplements here.   NKFA    Per MD 4/1 pt medically ready for discharge     CURRENT NUTRITION ORDERS  Diet: Regular  Intake/Tolerance: Poor x 3 weeks   3/30- ice cream   3/31- 50% yogurt/applesauce at breakfast, 50% lunch, 25% pudding, 100% jello, 100% dinner  4/1- 75% breakfast, 0% lunch     LABS  Reviewed  3/31- BUN 5.2(L)  "    MEDICATIONS  Reviewed   Scheduled ceftin, Vit B12, lovenox, levothyroxine, zestril    ANTHROPOMETRICS  Height: 157.5 cm (5' 2\")  Most Recent Weight: 54.7 kg (120 lb 9.6 oz)    IBW: 50 kg  BMI: Normal BMI  Weight History: No recent weight loss   Wt Readings from Last 3 Encounters:   04/01/24 54.7 kg (120 lb 9.6 oz)   01/09/20 60.8 kg (134 lb)   06/15/17 68 kg (150 lb)   3/27/24  54 kg (119 lb)   8/31/23  52.9 kg (116 lb 11.2 oz)   7/20/23  53.5 kg (117 lb 14.1 oz)   10/4/22  58 kg (127 lb 14.4 oz)     Dosing Weight: 54.7 kg    ASSESSED NUTRITION NEEDS  Estimated Energy Needs: 7965-2102 kcals/day (25 - 30+ kcals/kg)  Justification: Maintenance  Estimated Protein Needs: 55-66 grams protein/day (1 - 1.2 grams of pro/kg)  Justification: Increased needs  Estimated Fluid Needs: 1368 mL/day (25 mL/kg)   Justification: Maintenance    PHYSICAL FINDINGS/GI CONCERNS  See malnutrition section below.  Per Flowsheets:   GI: chronic diarrhea   BM: x6 noted this AM- loose     MALNUTRITION:  % Weight Loss:  None noted  % Intake:  <75% for > 7 days (moderate malnutrition)  Subcutaneous Fat Loss:  Unable to assess  Muscle Loss:  Unable to assess  Fluid Retention:  None noted    Malnutrition Diagnosis: Unable to determine due to need for NFPE, covid+ isolation precautions     NUTRITION DIAGNOSIS  Inadequate oral intake related to acute illness as evidenced by poor po x 3 weeks, decreased interest in eating.       INTERVENTIONS  Implementation  ONS- Ensure enlive BID at Breakfast and Dinner- Strawberry and chocolate    Goals  Pt to meet >75% nutritional needs   Electrolytes WNL      Monitoring/Evaluation  Progress toward goals will be monitored and evaluated per protocol.  "

## 2024-04-01 NOTE — PROGRESS NOTES
[facility]    Medicine Progress Note - Hospitalist Service    Date of Admission:  3/30/2024    Assessment & Plan   Mariela Ortiz is a 89 year old female admitted on 3/30/2024. She  presented to the ER with complaint of flulike symptoms for the past 3 weeks associated with cough, generalized weakness, poor appetite and oral intake, decreased mobility and diarrhea. Patient received ceftriaxone in the ED and is going to be admitted for generalized weakness secondary to dehydration requiring fluid resuscitation secondary to poor oral intake, acute on chronic diarrhea to rule out acute infectious etiology, and UTI.    Generalized weakness due to covid and dehydration, UTI  Failure to thrive   COVID-19 infection   UTI  Acute on chronic diarrhea possibly in setting of viral illness  - flulike symptoms for the past 3 weeks associated with cough, generalized weakness, poor appetite and oral intake, decreased mobility and diarrhea  -Family stated that they initially brought her to the ER 3 days ago at which time during the waiting process she got agitated and they brought her home but since then the symptoms have not improved and progressively worsened  -3 days ago tested for COVID positive   -Urinalysis with positive nitrates, leukocyte esterase, pyuria and bacteriuria suspicious for UTI, urine cultures +mixed urogenital min;   -Patient received ceftriaxone in the ED. Changed to oral abx  -X-ray did not show any acute process other than some atelectasis and patient did not have any pulmonary complaints  -No need for treatment for COVID for now as patient does not appear to be hypoxic and does not have any pulmonary complaints other than cough Currently holding loperamide as ruling out GI bacteria and viruses, if no infectious etiology is found can restart loperamide  -Continue gentle IV hydration  -improving and pt feels much better    Chronic lymphocytic leukemia  -follows with hematology once  a year and has not required any treatment in the past is undergoing surveillance by her oncologist    Cognitive impairment with dementia  Metabolic encephalopathy -start on Seroquel and Zyprexia prn  Monitor mental status closely  Appears mostly at baseline  Consider sitter if needed  Try to avoid sedating medication as at risk of delirium    Mild hyperglycemia most likely reactive  Hemoglobin A1c 5.3  Monitor blood sugar levels intermittently    Physical deconditioning/weakness  PT and OT and case management consulted for placement  Fall precautions    History of hypothyroidism  Continue with levothyroxine  Follow-up TSH    History of hypertension  Continue with lisinopril  Monitor vital signs as per protocol          Diet: Combination Diet Regular Diet Adult    DVT Prophylaxis: Enoxaparin (Lovenox) SQ  Zaman Catheter: Not present  Lines: None     Cardiac Monitoring: None  Code Status: Full Code      Clinically Significant Risk Factors              # Hypoalbuminemia: Lowest albumin = 3.4 g/dL at 3/31/2024  5:17 AM, will monitor as appropriate     # Hypertension: Noted on problem list     # Dementia: noted on problem list               Disposition Plan      Expected Discharge Date: 04/02/2024    Discharge Delays: Placement - LTC            Medially for discharge. LTC vs home with hhc if family can provide enough support    Tee Birch MD  Hospitalist Service  [facility]  Securely message with [employer] (more info)  Text page via [employer] Paging/Directory   ______________________________________________________________________    Interval History   Confused hard hearing, overall no complaints    Physical Exam   Vital Signs: Temp: 97.4  F (36.3  C) Temp src: Oral BP: 133/62 Pulse: 62   Resp: 16 SpO2: 99 % O2 Device: None (Room air)    Weight: 0 lbs 0 oz    General.  Awake confused, hard hearing not in acute distress.  HEENT.  Pupils equal round react to light, anicteric, EOM intact.  Neck supple  no JVD.  CVS regular rhythm no murmur gallops.  Lungs.  Clear to auscultation bilateral no wheezing or rales.  Abdomen.  Soft nontender bowel sounds present.  Extremities.  No edema no calf tenderness.  Neurological.  Awake  No focal deficit.  Skin no rash. No pallor.  Psych. Impaired memory and cognition    Medical Decision Making       47 MINUTES SPENT BY ME on the date of service doing chart review, history, exam, documentation & further activities per the note.      Data     I have personally reviewed the following data over the past 24 hrs:    13.2 (H)  \   11.9   / 180     N/A N/A N/A /  117 (H)   N/A N/A N/A \       Imaging results reviewed over the past 24 hrs:   No results found for this or any previous visit (from the past 24 hour(s)).

## 2024-04-02 PROCEDURE — 250N000011 HC RX IP 250 OP 636: Performed by: INTERNAL MEDICINE

## 2024-04-02 PROCEDURE — 250N000013 HC RX MED GY IP 250 OP 250 PS 637: Performed by: INTERNAL MEDICINE

## 2024-04-02 PROCEDURE — 250N000013 HC RX MED GY IP 250 OP 250 PS 637: Performed by: STUDENT IN AN ORGANIZED HEALTH CARE EDUCATION/TRAINING PROGRAM

## 2024-04-02 PROCEDURE — 120N000001 HC R&B MED SURG/OB

## 2024-04-02 RX ADMIN — OLANZAPINE 5 MG: 10 INJECTION, POWDER, FOR SOLUTION INTRAMUSCULAR at 10:42

## 2024-04-02 RX ADMIN — LEVOTHYROXINE SODIUM 100 MCG: 100 TABLET ORAL at 08:25

## 2024-04-02 RX ADMIN — LISINOPRIL 5 MG: 5 TABLET ORAL at 08:25

## 2024-04-02 RX ADMIN — Medication 3 MG: at 21:56

## 2024-04-02 RX ADMIN — QUETIAPINE FUMARATE 12.5 MG: 25 TABLET ORAL at 08:25

## 2024-04-02 RX ADMIN — ACETAMINOPHEN 1000 MG: 500 TABLET ORAL at 11:48

## 2024-04-02 RX ADMIN — CEFUROXIME AXETIL 250 MG: 250 TABLET ORAL at 08:25

## 2024-04-02 RX ADMIN — CYANOCOBALAMIN TAB 1000 MCG 1000 MCG: 1000 TAB at 08:25

## 2024-04-02 RX ADMIN — QUETIAPINE FUMARATE 12.5 MG: 25 TABLET ORAL at 18:36

## 2024-04-02 RX ADMIN — CEFUROXIME AXETIL 250 MG: 250 TABLET ORAL at 20:38

## 2024-04-02 ASSESSMENT — ACTIVITIES OF DAILY LIVING (ADL)
ADLS_ACUITY_SCORE: 41
ADLS_ACUITY_SCORE: 42
ADLS_ACUITY_SCORE: 42
ADLS_ACUITY_SCORE: 43
ADLS_ACUITY_SCORE: 42
ADLS_ACUITY_SCORE: 43
ADLS_ACUITY_SCORE: 42
ADLS_ACUITY_SCORE: 42
ADLS_ACUITY_SCORE: 43
ADLS_ACUITY_SCORE: 42
ADLS_ACUITY_SCORE: 43
ADLS_ACUITY_SCORE: 41
ADLS_ACUITY_SCORE: 43
ADLS_ACUITY_SCORE: 43

## 2024-04-02 NOTE — PROGRESS NOTES
Pt refusing blood sugar checks. Reapproached x3. Pt eating dinner now. Willing to eat pizza. Drinking  chocolate ensure. Pt is disoriented x3. Pt has been trying to leave all shift.

## 2024-04-02 NOTE — PLAN OF CARE
Problem: Adult Inpatient Plan of Care  Goal: Absence of Hospital-Acquired Illness or Injury  Intervention: Identify and Manage Fall Risk  Recent Flowsheet Documentation  Taken 4/2/2024 0830 by Lesa Huff RN  Safety Promotion/Fall Prevention:   activity supervised   bedside attendant  Intervention: Prevent Skin Injury  Recent Flowsheet Documentation  Taken 4/2/2024 0830 by Lesa Huff RN  Body Position: position changed independently  Intervention: Prevent Infection  Recent Flowsheet Documentation  Taken 4/2/2024 0830 by Lesa Huff RN  Infection Prevention: hand hygiene promoted   Goal Outcome Evaluation:plan if can have 24/7 care to go home otherwise to transitional care then home  Problem: Risk for Delirium  Goal: Improved Attention and Thought Clarity  Intervention: Maximize Cognitive Function  Recent Flowsheet Documentation  Taken 4/2/2024 0830 by Lesa Huff RN  Sensory Stimulation Regulation: television on  Reorientation Measures: clock in view-patient up in room and hitting cane against wall - wanting to go home - family coming to see patient   Problem: Confusion Acute  Goal: Optimal Cognitive Function-reorient as able   Intervention: Minimize Contributing Factors  Recent Flowsheet Documentation  Taken 4/2/2024 0830 by Lesa Huff RN  Sensory Stimulation Regulation: television on  Reorientation Measures: clock in view  Environment Familiarity/Consistency: daily routine followed    Plan of Care Reviewed With: patient alert to self- able to make needs known.  Pacing in room and hitting with cane - given oral seroquel and then IM xyprexa to help  calm her.  Family in room - helps keep patient calm.  Continue 1:1 until not needed

## 2024-04-02 NOTE — PROGRESS NOTES
Aid called nurse into room and patient hitting wall and items in room with her cane.  Swearing and threatening aid. Hitting towards aid and very paranoid that they are putting things in her food.  Nurse able to calm patient and offered to call her daughter Stephanie.  Patient then sat down.  Nurse called Stephanie and was told that other daughter Lyla is on her way.  Notified Dr. Birch.  Seroquel given at 830.     1030 spoke to daughter Lyla who is now in the room with her mother.  Nurse talked to her about giving her mother some xyprexa IM which will help calm her down and hopefully let her sleep some.  Lyla said that her mother has not really slept since Saturday and is willing for us to give.      1045 xyprexa 5mg IM in left deltoid given will continue to monitor    1150 patient complaining of shoulders and back hurting.  Gave tylenol.  Daughter states that patient is up and down in chair.

## 2024-04-02 NOTE — PROGRESS NOTES
[facility]    Medicine Progress Note - Hospitalist Service    Date of Admission:  3/30/2024    Assessment & Plan   Mariela Ortiz is a 89 year old female admitted on 3/30/2024. She  presented to the ER with complaint of flulike symptoms for the past 3 weeks associated with cough, generalized weakness, poor appetite and oral intake, decreased mobility and diarrhea, confusion. Patient received ceftriaxone in the ED and was admitted for generalized weakness secondary to dehydration requiring fluid resuscitation secondary to poor oral intake, acute on chronic diarrhea to rule out acute infectious etiology, and UTI.    Currently pt is on 1:1 due to agitation/confusion. Await placement plan.    Generalized weakness due to covid and dehydration, UTI  Failure to thrive   COVID-19 infection   UTI  Acute on chronic diarrhea possibly in setting of viral illness  - flulike symptoms for the past 3 weeks associated with cough, generalized weakness, poor appetite and oral intake, decreased mobility and diarrhea  -Family stated that they initially brought her to the ER 3 days ago at which time during the waiting process she got agitated and they brought her home but since then the symptoms have not improved and progressively worsened  -3 days PTA tested for COVID positive   -Urinalysis with positive nitrates, leukocyte esterase, pyuria and bacteriuria suspicious for UTI, urine cultures +mixed urogenital min;   -Patient received ceftriaxone in the ED. Changed to oral abx  -X-ray did not show any acute process other than some atelectasis and patient did not have any pulmonary complaints  -No need for treatment for COVID for now as patient does not appear to be hypoxic and does not have any pulmonary complaints other than cough Currently holding loperamide as ruling out GI bacteria and viruses, if no infectious etiology is found can restart loperamide  -improving slowly and pt feels much better    Chronic  lymphocytic leukemia  -follows with hematology once a year and has not required any treatment in the past is undergoing surveillance by her oncologist    Cognitive impairment with dementia  Metabolic encephalopathy -start on Seroquel and Zyprexia prn  Sleep deprivation   Monitor mental status closely  Appears mostly at baseline  Consider sitter if needed  Try to avoid sedating medication as at risk of delirium  -pt is agitated on and off, worse at night; sleeps only for 2 hours every day for last everal days  -pt is on 1:1 and thus cannot get accepted into TCU  -will consult psychiatrist     Mild hyperglycemia most likely reactive  Hemoglobin A1c 5.3  Monitor blood sugar levels intermittently    Physical deconditioning/weakness  PT and OT and case management consulted for placement.   Family ok with TCU  Fall precautions    History of hypothyroidism  Continue with levothyroxine  Follow-up TSH    History of hypertension  Continue with lisinopril  Monitor vital signs as per protocol          Diet: Combination Diet Regular Diet Adult  Snacks/Supplements Adult: Ensure Enlive; With Meals    DVT Prophylaxis: Enoxaparin (Lovenox) SQ  Zaman Catheter: Not present  Lines: None     Cardiac Monitoring: None  Code Status: Full Code      Clinically Significant Risk Factors              # Hypoalbuminemia: Lowest albumin = 3.4 g/dL at 3/31/2024  5:17 AM, will monitor as appropriate     # Hypertension: Noted on problem list     # Dementia: noted on problem list               Disposition Plan      Expected Discharge Date: 04/03/2024    Discharge Delays: Placement - LTC  1:1 Sitter still ordered - MD to assess            Await psych consultation and hopefully can wean off 1:1    Tee Birch MD  Hospitalist Service  [facility]  Securely message with [employer] (more info)  Text page via [employer] Paging/Directory   ______________________________________________________________________    Interval History   Pt is  "confused. When I asked how she is feeling, she said \"terrible\" . When I asked what mde her feeling bad, she said \"I forgot\". Patient was agitated and hit wall with crane earlier, received Zyprexia and Seroquel. Daughter in room who stated family are unable to take care of pt due to her confusion and agitation. Ok with TCU, but pt needs to wean off 1:1 o go to TCU.     D/wed with daughter, nurse, natali    Physical Exam   Vital Signs: Temp: 97.4  F (36.3  C) Temp src: Axillary BP: (!) 144/65 Pulse: 54   Resp: 18 SpO2: 98 % O2 Device: None (Room air)    Weight: 120 lbs 9.6 oz    General.  Awake confused and disoriented not in acute distress.  HEENT.  Pupils equal round react to light, anicteric, EOM intact.  Neck supple no JVD.  CVS regular rhythm no murmur gallops.  Lungs.  Clear to auscultation bilateral no wheezing or rales.  Abdomen.  Soft nontender bowel sounds present.  Extremities.  No edema no calf tenderness.  Neurological.  Awake and alert. No focal deficit.  Skin no rash. No pallor.  Psych. Impaired memory and cognition      Medical Decision Making       51 MINUTES SPENT BY ME on the date of service doing chart review, history, exam, documentation & further activities per the note.      Data         Imaging results reviewed over the past 24 hrs:   No results found for this or any previous visit (from the past 24 hour(s)).    "
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
## 2024-04-02 NOTE — PROGRESS NOTES
"Care Management Follow Up    Length of Stay (days): 3    Expected Discharge Date: 04/04/2024     Concerns to be Addressed: discharge planning     Patient plan of care discussed at interdisciplinary rounds: Yes    Anticipated Discharge Disposition:  TCU - referrals pending     Anticipated Discharge Services:  NA  Anticipated Discharge DME:  NA    Patient/family educated on Medicare website which has current facility and service quality ratings:  Yes  Education Provided on the Discharge Plan:  Yes  Patient/Family in Agreement with the Plan:  Yes    Referrals Placed by CM/SW:  Post Acute Facilities  Private pay costs discussed: Not applicable    Additional Information:  Discussed with MD.     Patient requiring TCU, however on a 1:1 for safety.     TCU referrals pending, additional referrals submitted based upon preferences in consult note.     Social HX: Mariela was found to be COVID + on 3/27/24. Family had brought her to [facility] ER on 3/27/24, but family took her home because they didn't want to board in the ER. They took her home, but she was \"too much work at home with her worsening confusion, so brought her to [facility] ER.\"     She lives in a house with her daughter Judith. It is Mariela's house. Judith is \"not with her during the daytimes when she is working. Normally this is fine that she is alone sometimes, but over the past few days she has had increased confusion and increased agitation.\" \"She most often uses the cane. Also has a 4WW if needed\".     She is independent with most ADLs and gets help with all IADLs.     CM will continue to follow care progression and aide in discharge planning as needed.     Shyanne Collins RN      "
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
## 2024-04-03 ENCOUNTER — APPOINTMENT (OUTPATIENT)
Dept: PHYSICAL THERAPY | Facility: HOSPITAL | Age: 89
DRG: 884 | End: 2024-04-03
Payer: COMMERCIAL

## 2024-04-03 LAB
GLUCOSE BLDC GLUCOMTR-MCNC: 153 MG/DL (ref 70–99)
GLUCOSE BLDC GLUCOMTR-MCNC: 157 MG/DL (ref 70–99)

## 2024-04-03 PROCEDURE — 250N000013 HC RX MED GY IP 250 OP 250 PS 637: Performed by: STUDENT IN AN ORGANIZED HEALTH CARE EDUCATION/TRAINING PROGRAM

## 2024-04-03 PROCEDURE — 97530 THERAPEUTIC ACTIVITIES: CPT | Mod: GP

## 2024-04-03 PROCEDURE — 250N000013 HC RX MED GY IP 250 OP 250 PS 637: Performed by: INTERNAL MEDICINE

## 2024-04-03 PROCEDURE — 120N000001 HC R&B MED SURG/OB

## 2024-04-03 PROCEDURE — 250N000011 HC RX IP 250 OP 636: Performed by: INTERNAL MEDICINE

## 2024-04-03 PROCEDURE — 97116 GAIT TRAINING THERAPY: CPT | Mod: GP

## 2024-04-03 PROCEDURE — 250N000013 HC RX MED GY IP 250 OP 250 PS 637: Performed by: REGISTERED NURSE

## 2024-04-03 PROCEDURE — 99222 1ST HOSP IP/OBS MODERATE 55: CPT | Performed by: REGISTERED NURSE

## 2024-04-03 RX ORDER — LOPERAMIDE HCL 2 MG
2 CAPSULE ORAL 4 TIMES DAILY PRN
Status: DISCONTINUED | OUTPATIENT
Start: 2024-04-03 | End: 2024-04-15 | Stop reason: HOSPADM

## 2024-04-03 RX ORDER — OLANZAPINE 10 MG/2ML
2.5 INJECTION, POWDER, FOR SOLUTION INTRAMUSCULAR 3 TIMES DAILY PRN
Status: DISCONTINUED | OUTPATIENT
Start: 2024-04-03 | End: 2024-04-03

## 2024-04-03 RX ORDER — OLANZAPINE 10 MG/2ML
2.5 INJECTION, POWDER, FOR SOLUTION INTRAMUSCULAR 2 TIMES DAILY PRN
Status: DISCONTINUED | OUTPATIENT
Start: 2024-04-03 | End: 2024-04-15 | Stop reason: HOSPADM

## 2024-04-03 RX ADMIN — ACETAMINOPHEN 1000 MG: 500 TABLET ORAL at 09:24

## 2024-04-03 RX ADMIN — CALCIUM CARBONATE (ANTACID) CHEW TAB 500 MG 1000 MG: 500 CHEW TAB at 20:50

## 2024-04-03 RX ADMIN — ACETAMINOPHEN 1000 MG: 500 TABLET ORAL at 02:46

## 2024-04-03 RX ADMIN — LISINOPRIL 5 MG: 5 TABLET ORAL at 09:24

## 2024-04-03 RX ADMIN — CEFUROXIME AXETIL 250 MG: 250 TABLET ORAL at 20:31

## 2024-04-03 RX ADMIN — CALCIUM CARBONATE (ANTACID) CHEW TAB 500 MG 1000 MG: 500 CHEW TAB at 17:13

## 2024-04-03 RX ADMIN — ENOXAPARIN SODIUM 40 MG: 40 INJECTION SUBCUTANEOUS at 17:13

## 2024-04-03 RX ADMIN — CYANOCOBALAMIN TAB 1000 MCG 1000 MCG: 1000 TAB at 09:24

## 2024-04-03 RX ADMIN — OLANZAPINE 2.5 MG: 5 TABLET, ORALLY DISINTEGRATING ORAL at 20:50

## 2024-04-03 RX ADMIN — CEFUROXIME AXETIL 250 MG: 250 TABLET ORAL at 09:24

## 2024-04-03 RX ADMIN — QUETIAPINE FUMARATE 12.5 MG: 25 TABLET ORAL at 15:28

## 2024-04-03 RX ADMIN — LEVOTHYROXINE SODIUM 100 MCG: 100 TABLET ORAL at 09:24

## 2024-04-03 RX ADMIN — ACETAMINOPHEN 1000 MG: 500 TABLET ORAL at 15:27

## 2024-04-03 RX ADMIN — OLANZAPINE 5 MG: 10 INJECTION, POWDER, FOR SOLUTION INTRAMUSCULAR at 04:00

## 2024-04-03 RX ADMIN — QUETIAPINE FUMARATE 12.5 MG: 25 TABLET ORAL at 02:46

## 2024-04-03 RX ADMIN — TRAZODONE HYDROCHLORIDE 25 MG: 50 TABLET ORAL at 20:31

## 2024-04-03 ASSESSMENT — ACTIVITIES OF DAILY LIVING (ADL)
ADLS_ACUITY_SCORE: 43
ADLS_ACUITY_SCORE: 42
ADLS_ACUITY_SCORE: 42
ADLS_ACUITY_SCORE: 43
ADLS_ACUITY_SCORE: 42
ADLS_ACUITY_SCORE: 42
ADLS_ACUITY_SCORE: 43
ADLS_ACUITY_SCORE: 42
ADLS_ACUITY_SCORE: 42
ADLS_ACUITY_SCORE: 43
ADLS_ACUITY_SCORE: 43
ADLS_ACUITY_SCORE: 42
ADLS_ACUITY_SCORE: 43
ADLS_ACUITY_SCORE: 42
ADLS_ACUITY_SCORE: 43
ADLS_ACUITY_SCORE: 43
ADLS_ACUITY_SCORE: 42
ADLS_ACUITY_SCORE: 42

## 2024-04-03 NOTE — PLAN OF CARE
Problem: Infection  Goal: Absence of Infection Signs and Symptoms  4/3/2024 1442 by Olena Deluna, RN  Outcome: Progressing  4/3/2024 1442 by Olena Deluna RN  Reactivated  Intervention: Prevent or Manage Infection  Recent Flowsheet Documentation  Taken 4/3/2024 0925 by Olena Deluna, RN  Isolation Precautions: special precautions maintained   Goal Outcome Evaluation:       Patient alert and oriented to self only. Patient tangential conversations when asked questions and requires reminders to stay on task. Patient is calm and cooperative this shift. Daughters present majority of shift. Up to bathroom with supervision.

## 2024-04-03 NOTE — PLAN OF CARE
"  Problem: Gas Exchange Impaired  Goal: Optimal Gas Exchange  Outcome: Adequate for Care Transition     Problem: Adult Inpatient Plan of Care  Goal: Plan of Care Review  Description: The Plan of Care Review/Shift note should be completed every shift.  The Outcome Evaluation is a brief statement about your assessment that the patient is improving, declining, or no change.  This information will be displayed automatically on your shift  note.  Outcome: Adequate for Care Transition  Goal: Patient-Specific Goal (Individualized)  Description: You can add care plan individualizations to a care plan. Examples of Individualization might be:  \"Parent requests to be called daily at 9am for status\", \"I have a hard time hearing out of my right ear\", or \"Do not touch me to wake me up as it startles  me\".  Outcome: Adequate for Care Transition  Goal: Absence of Hospital-Acquired Illness or Injury  Outcome: Adequate for Care Transition  Intervention: Identify and Manage Fall Risk  Recent Flowsheet Documentation  Taken 4/3/2024 0100 by Terrie Wong RN  Safety Promotion/Fall Prevention:   activity supervised   bedside attendant   clutter free environment maintained   lighting adjusted   nonskid shoes/slippers when out of bed   patient and family education  Intervention: Prevent Skin Injury  Recent Flowsheet Documentation  Taken 4/3/2024 0100 by Terrie Wong RN  Body Position: position changed independently  Intervention: Prevent Infection  Recent Flowsheet Documentation  Taken 4/3/2024 0100 by Terrie Wong RN  Infection Prevention: hand hygiene promoted  Goal: Optimal Comfort and Wellbeing  Outcome: Adequate for Care Transition  Intervention: Provide Person-Centered Care  Recent Flowsheet Documentation  Taken 4/3/2024 0100 by Terrie Wong RN  Trust Relationship/Rapport:   care explained   choices provided   emotional support provided   empathic listening provided  Goal: Readiness for Transition of Care  Outcome: Adequate for " Care Transition     Problem: Comorbidity Management  Goal: Maintenance of Asthma Control  Outcome: Adequate for Care Transition  Goal: Maintenance of Behavioral Health Symptom Control  Outcome: Adequate for Care Transition  Goal: Maintenance of COPD Symptom Control  Outcome: Adequate for Care Transition  Intervention: Maintain COPD Symptom Control  Recent Flowsheet Documentation  Taken 4/3/2024 0100 by Terrie Wong RN  Supportive Measures: active listening utilized  Goal: Blood Glucose Levels Within Targeted Range  Outcome: Adequate for Care Transition  Goal: Maintenance of Heart Failure Symptom Control  Outcome: Adequate for Care Transition  Goal: Blood Pressure in Desired Range  Outcome: Adequate for Care Transition  Goal: Maintenance of Osteoarthritis Symptom Control  Outcome: Adequate for Care Transition  Intervention: Maintain Osteoarthritis Symptom Control  Recent Flowsheet Documentation  Taken 4/3/2024 0100 by Terrie Wong RN  Assistive Device Utilized: cane  Activity Management: ambulated in room  Goal: Bariatric Home Regimen Maintained  Outcome: Adequate for Care Transition  Goal: Maintenance of Seizure Control  Outcome: Adequate for Care Transition  Intervention: Maintain Seizure Symptom Control  Recent Flowsheet Documentation  Taken 4/3/2024 0100 by Terrie Wong RN  Sensory Stimulation Regulation:   lighting decreased   television on     Problem: UTI (Urinary Tract Infection)  Goal: Improved Infection Symptoms  Outcome: Adequate for Care Transition   Goal Outcome Evaluation:  Pt cont to be on 1:1 for safety. Did take tylenol and prn seroquel at 0245 which did not seem to make much difference in behaviors. Up to bathroom several times with diarrhea and to void. Restless throughout the shift at 0400 prn IM xyprexa was given. Pt is more calm at this time. Pt has been coughing frequently throughout the night.

## 2024-04-03 NOTE — PROGRESS NOTES
Chart reviewed and discussed with nurse, SW and psychiastrist.     Medically stable and slowly improving from COVID and UTI.Add imodium for diarrhea which might be due to COVID.     Currently the issue is dementia with intermittent worsening confusion and delirium, agitation, on 1:1, which make TCU placement impossible.     Appreciate psych consultation and plan to wean off 1:1. If pt can get off 1:1, plan to discharge to TCU when bed available.

## 2024-04-03 NOTE — CONSULTS
"      Initial Psychiatric Consult   Consult date: April 3, 2024         Reason for Consult, requesting source:    Dementia with confusion, agitation, on 1:1, unable to discharge    Requesting source: Tee Birch    Labs and imaging reviewed. Provider contacted with recommendations.         HPI:   Psychiatry seeing patient today regarding dementia with confusion, requiring 1:1 observation delaying discharge.    Mariela Ortiz is a 89 year old female admitted on 3/30/2024. She  presented to the ER with complaint of flulike symptoms for the past 3 weeks associated with cough, generalized weakness, poor appetite and oral intake, decreased mobility and diarrhea, confusion. Patient received ceftriaxone in the ED and was admitted for generalized weakness secondary to dehydration requiring fluid resuscitation secondary to poor oral intake, acute on chronic diarrhea to rule out acute infectious etiology, and UTI.     Per nursing notation from 4/3: Pt cont to be on 1:1 for safety. Did take tylenol and prn seroquel at 0245 which did not seem to make much difference in behaviors. Up to bathroom several times with diarrhea and to void. Restless throughout the shift at 0400 prn IM xyprexa was given. Pt is more calm at this time. Pt has been coughing frequently throughout the night.      Today, patient reports feeling \"good\" and denies restlessness or anxiety. She shares that she was frustrated, as she felt she was not receiving all information regarding her care. Patient is agreeable to medications for intermittent anxiety and sleep.           Past Psychiatric History:   No psychiatric history of note.        Substance Use and History:     Tobacco Use    Smoking status: Former     Types: Cigarettes     Start date: 4/1/2002    Smokeless tobacco: Never   Substance Use Topics    Alcohol use: Yes     Comment: 2 beers a week           Past Medical History:   PAST MEDICAL HISTORY:   Past Medical History:   Diagnosis Date    " Melanoma (H)        PAST SURGICAL HISTORY:   Past Surgical History:   Procedure Laterality Date    APPENDECTOMY      CHOLECYSTECTOMY, LAPOROSCOPIC      Cholecystectomy, Laparoscopic    HYSTERECTOMY, PAP NO LONGER INDICATED  1981    menorrhagia    JOINT REPLACEMENT, HIP RT/LT  fall 2002    right    JOINT REPLACEMTN, KNEE RT/LT  2002    Joint Replacement knee RT/LT             Family History:   FAMILY HISTORY:   Family History   Problem Relation Age of Onset    Diabetes Daughter            Social History:   Patient lives with her daughter, Judith.          Physical ROS:   The 10 point Review of Systems is negative other than noted in the HPI or here.           Medications:     Current Facility-Administered Medications   Medication Dose Route Frequency Provider Last Rate Last Admin    cefuroxime (CEFTIN) tablet 250 mg  250 mg Oral Q12H [facility] (08/20) Tee Birch MD   250 mg at 04/02/24 2038    cyanocobalamin (VITAMIN B-12) tablet 1,000 mcg  1,000 mcg Oral Daily Gondal, Saad J, MD   1,000 mcg at 04/02/24 0825    enoxaparin ANTICOAGULANT (LOVENOX) injection 40 mg  40 mg Subcutaneous Q24H Tee Birch MD        levothyroxine (SYNTHROID/LEVOTHROID) tablet 100 mcg  100 mcg Oral Daily Gondal, Saad J, MD   100 mcg at 04/02/24 0825    lisinopril (ZESTRIL) tablet 5 mg  5 mg Oral Daily Gondal, Saad J, MD   5 mg at 04/02/24 0825    nicotine (NICODERM CQ) 21 MG/24HR 24 hr patch 1 patch  1 patch Transdermal Daily Tee Birch MD        sodium chloride (PF) 0.9% PF flush 3 mL  3 mL Intracatheter Q8H Gondal, Saad J, MD                  Allergies:     Allergies   Allergen Reactions    Morphine Rash and Unknown     Red line went up her arm when given  Other reaction(s): Erythema  Red line went up her arm when given      Oxycodone-Acetaminophen Rash          Labs:     Recent Results (from the past 48 hour(s))   Glucose by meter    Collection Time: 04/01/24 10:43 PM   Result Value Ref Range    GLUCOSE BY METER POCT 114 (H) 70 - 99  "mg/dL          Physical and Psychiatric Examination:     BP 98/61 (BP Location: Right arm)   Pulse 71   Temp 97.8  F (36.6  C) (Oral)   Resp 18   Ht 1.575 m (5' 2\")   Wt 54.7 kg (120 lb 9.6 oz)   SpO2 98%   BMI 22.06 kg/m    Weight is 120 lbs 9.6 oz  Body mass index is 22.06 kg/m .    Physical Exam:  I have reviewed the physical exam as documented by by the medical team and agree with findings and assessment and have no additional findings to add at this time.         MSE:   Calm and cooperative. Patient has dx of dementia, but was able to answer questions appropriately. Denies AH/VH. Patient insight appears fair and judgement is intact.          DSM-5 Diagnosis:   Neurocognitive disorder, delirium superimposed on pre-existing dementia          Assessment:   Psychiatry seeing patient today regarding confusion with agitation, requiring 1:1 observation. This 1:1 observation has hindered patient's ability to discharge to a TCU. Given patient's pre-existing dementia, COVID infection and recent UTI, it would not be unexpected to observe an increase in confusion and agitation. During my assessment of patient, she was calm and cooperative.  Patient does not appear to have scheduled medications for agitation or sleep, but has been receiving PRN medications overnight on 4/3 at 0246 and 0400. It is reasonable to schedule a medication for patient to help facilitate a more adequate night of sleep, which should help improve daytime confusion and agitation.            Summary of Recommendations:   1) Scheduled trazodone 25 mg at 2000 to help target sleep    2) Can continue Zyprexa ODT 2.5 mg BID PRN for anxiety and agitation associated with delirium, with IM option for extreme agitation and aggression that is unsafe for patient and staff    3) Delirium precautions:  Up during the day with lights on  Lights off at night, avoid interruptions during the night as much as possible  Family visits  Encourage wearing " "glasses  Reorientation  Avoid opioids, benzodiazepines, anticholinergics.    Continue to ensure proper nutrition, fluid and electrolyte balance. Monitor for infections, hypoxia, metabolic derangements, or other causes of delirium.         It is increasingly recognised that pharmacological treatments for dementia should be used as a second-line approach and that non-pharmacological options should, in best practice, be pursued first (KATELIN Gordon., RUBA Tapia, & BETO Nath (2004). Non-pharmacological interventions in dementia. Advances in Psychiatric Treatment, 10(3), 171-177. Doi:10.1192/apt.10.3.171)    Non-pharmacological approaches are preferable to reduce responsive behaviours, improve/maintain functional capacity and reduce emotional disorders (Gurpreet O'Mignon 2018).     Please see below.     DEMENTIA INTERVENTIONS        Non-pharmacological interventions include but are not limited to:   Lavender   Warm Blankets and/or weighted blankets   Activities of interest currently or in the past   Calming music   5,4,3,2,1    Grounding exercise: 5 things you see, 4 things you feel, 3 things you hear, 2 things you smell, 1 thing you taste    Dementia Basics  Use a consistent positive physical approach  - gesture & greet by name   - offer your hand & make eye contact   - approach slowly within visual range   - shake hands & maintain hand-under-hand   - move to the side of the patient  - get to eye level & respect intimate space   - wait for acknowledgement     How you help     Sight or Visual cues    Verbal or Auditory cues    Touch or Tactile cues     USE VISUAL combined VERBAL (gesture/point)   -  It s about time for     -  Let s go this way     -  Here are your socks      DON T ask questions you DON T want to hear the answer to  ( Do you want to  ,  Are you ready to  , \"I need you to  )    Acknowledge the response/reaction to your info     USE THEIR WORDS (with a ? OR in agreement)    LIMIT your words - Keep it SIMPLE    " WAIT!!!!      ID common interest    Say something nice about the person or their place    Share something about yourself and encourage the person to share back    Follow their lead - listen actively    Use some of their words to keep the flow going    Remember its the FIRST TIME!    Do s    Go with the FLOW    Use SUPPORTIVE communication techniques   - Use objects and the environment   - Give examples   - Use gestures and pointing   - Acknowledge & accept emotions   - Use empathy & Validation   - Use familiar phrases or known interests   - Respect  values  and  beliefs  - avoid the negative     DON Ts    Try to CONTROL the FLOW   - Give up reality orientation and BIG lies   - Do not correct errors   - Offer info if asked, monitoring the emotional state    Try to STOP the FLOW   - Don t reject topics   - Don t try to distract UNTIL you are well connected   - Keep VISUAL cues positive       A Positive Physical Approach for Someone with Dementia   1. Knock on door or table - to get attention if the person is not looking at you & get permission to enter or approach     2. Open palm near face and smile - look friendly and give the person a visual cue make eye contact     3. Call the person by name OR at least say  Hi!      4. Move your hand out from an open hand near face to a greeting handshake position     5. Approach the person from the front - notice their reaction to your outstretched hand - start approaching or let the person come to you, if s/he likes to be in control     6. Move slowly - one step/second, stand tall, don t crouch down or lean in as you move toward the person     7. Move toward the right side of the person and offer your hand - give the person time to look at your hand and reach for it, if s/he is doing something else - offer, don t force     8. Stand to the side of the person at arm s length - respect personal space & be supportive not confrontational     9. Shake hands with the person - make eye  contact while shaking     10. Slide your hand from a  shake  position to hand-under-hand position - for safety,     connection, and function     11. Give your name & greet -  I m (name). It s good to see you!      12. Get to the person s level to talk - sit, squat, or kneel if the person is seated and stand beside the person if s/he is standing     13. NOW, deliver your message        Approaching When The Person is distressed:    TWO CHANGES -   1. Look concerned not too happy, if the person is upset     2. Let the person move toward you, keeping your body turned  sideways(supportive - not confrontational)     3. After greeting  try one of two options    a.  Sounds like you are (give an emotion or feeling that seems to be true)???    b. Repeat the person s words to you  If s/he said,  Where s my mom?  you   would say  You re looking for your mom (pause)  tell me about your mom     If the person said  I want to go home! , you would say  You want to go home (pause)  Tell me about your home  .     BASIC CARD CUES - WITH Dementia   ? -Knock - Announce self   ? -Greet & Smile   ? -Move Slowly - Hand offered in  handshake  position   ? -Move from the front to the side   ? -Greet with a handshake & your name        -Slide into hand-under-hand hold         -Get to the person s level   ? -Be friendly -make a  nice  comment or smile         -Give your message  simple, short, friendly     First -     ALWAYS use the positive physical approach!     Then -     Pay attention to the THREE ways you communicate     1 .  How you speak   - Tone of voice (friendly not bossy or critical)   - Pitch of voice (deep is better)   - Speed of speech ( slow and easy not pressured or fast)     2 .  What you say   THREE basic reasons to talk to someone   1. To get the person to DO something (5   approaches to try)   1. give a short, direct message about what is happening   2. give simple choices about what the person can do   3. ask the person to  "help you do something   4. ask if the person will give it a try   5.  break down the task - give it one step at a time     ** only ask  Are you ready to   If you are willing to come back later **     2.  Just to have a friendly interaction - to talk to the person    go slow - Go with Flow   2.  acknowledge emotions - \"sounds like , seems like , I can see you are \"   ?  3. use familiar words or phrases (what the person uses)   4. know who the person has been as a person what s/he values   ?  5. use familiar objects, pictures, actions to help & direct   ?   6.be prepared to have the same conversation over & over   ?   7. look interested & friendly   8.be prepared for some emotional outbursts   9.DON'T argue  - BUT don't let the person get into dangerous situations     **REMEMBER - the person is doing the BEST that s/he can**    3. Deal with the person's distress or frustration/anger   1.Try to figure out what the person really NEEDS or WANTS (\"It sounds like \" \"It looks like \" \"It seems like \" \"You're feeling \")   2.Use empathy not forced reality or lying   3.Once the person is listening and responding to you THEN -   4.Redirect his attention and actions to something that is OK OR   5.Distract him with other things or activities you know he likes & values     **Always BE CAREFUL about personal space and touch with the person especially when s/he is distressed or being forceful **    4.  How you respond to the person   1.use positive, friendly approval or praise (short, specific and sincere)   2.offer your thanks and appreciation for his/her efforts   3.laugh with him/her & appreciate attempts at humor & friendliness   4.  shake hands to start and end an interaction    5.  use touch - hugging, hand holding, comforting only IF the person wants it     If what you are doing is NOT working -     ? STOP!     ? BACK OFF - give the person some space and time     ? Decide on what to do differently     ? Try Again!       Key " Points About 'Who' the person Is .   - preferred name   - introvert or extrovert   - a planner or a doer   - a follower or a leader   - a 'detail' or a 'big picture' person   - work history - favorite and most hated jobs or parts of jobs   - family relationships and history - feelings about   -various family members   - social history - memberships and relationships to friends and groups   - leisure background - favorite activities & beliefs about fun, games, & free time   - previous daily routines and schedules   - personal care habits and preferences   - Judaism and spiritual needs and beliefs   - values and interests   - favorite topics, foods, places   - favorite music and songs - dislike of music or songs   - hot buttons & stressors   - behavior under stress   - what things help with stress?   - handedness   - level of cognitive impairment   - types of help that are useful       Communication - When Words Don t Work Anymore      Keys to Success:   -Watch movements & actions   -Watch facial expressions and eye movements   -Listen for changes in volume, frequency, and intensity of sounds or words   -Investigate & Check it out   -Meet the need     It s all about Meeting Needs    -Physical needs   -Emotional needs   -Probable Needs:   -Physical  ?  *Tired   *In pain or uncomfortable   *Thirsty or Hungry   *Need to pee or have a BM or already did & need help   *Too hot or too cold     -Emotional   *Afraid   *Lonely   *Bored   *Angry   *Excited     What Can You Do?   -Figure it out Go thru the list   -Meet the need  Offer help that matches need   -Use visual cues more than verbal cues   -Use touch only after  permission  is given   -Connect - Visually, Verbally, Tactilely   -Protect Yourself & the Person - use Hand Under Hand & Supportive Stance techniques   -Reflect - copy expression/tone, repeat some key words, move with the person   -Engage - LISTEN with your head, your heart, and your body  -Respond - try to  "meet the unmet needs, offer comfort and connection     ** IF IT DOESN T seem to be working - STOP, BACK OFF - and then TRY AGAIN - changing something in your efforts (visually, verbally, or through touch/physical contact)**     Types of Help - Using Your Senses   Visual   -Written Information - Schedules and Notes   -Key Word Signs - locators & identifiers   -Objects in View - familiar items to stimulate task performance   -Gestures - pointing and movements   -Demonstration - provide someone to imitate     2.  Auditory  -Talking and Telling - give information, ask questions, provide choices   -Breaking it Down - Step-by-Step Task Instructions   -Using Simple Words and Phrases - Verbal Cues   -Name Calling - Auditory Attention   -Positive Feedback - praise, \"yes\", encouragement     3.Tactile - Touch   -Greeting & Comforting - lennox rangel, 'hand-holding'   -Touch for Attention during tasks  - Tactile Guidance - lead through 'once' to get the feel   -Hand-Under-Hand Guidance - palm to palm contact   -Hand-Under-Hand Assistance - physical help   -Dependent Care - doing for & to the person               Page me or re-consult psychiatry as needed.       Selene Gleason, PMEKATERINAP, APRN  Consult/Liaison Psychiatry  [facility]   Contact information available via [facility] Paging/Directory.  If I am not available, please call [facility] intake ([phone])              "

## 2024-04-03 NOTE — PROGRESS NOTES
Care Management Follow Up    Length of Stay (days): 4    Expected Discharge Date: 04/04/2024     Concerns to be Addressed: discharge planning     Patient plan of care discussed at interdisciplinary rounds: Yes    Anticipated Discharge Disposition:  TCU     Anticipated Discharge Services:    Anticipated Discharge DME:      Patient/family educated on Medicare website which has current facility and service quality ratings:    Education Provided on the Discharge Plan:    Patient/Family in Agreement with the Plan:      Referrals Placed by CM/SW:    Private pay costs discussed: Not applicable    Additional Information:  SW following for discharge planning to TCU, pt continues on 1:1, psyc consulted. Psyc submitted recommendations. SW to continue to follow for discharge planning.     SAM Bryant

## 2024-04-03 NOTE — PLAN OF CARE
Problem: Adult Inpatient Plan of Care  Goal: Absence of Hospital-Acquired Illness or Injury  Intervention: Identify and Manage Fall Risk  Recent Flowsheet Documentation  Taken 4/2/2024 1600 by Leisa Xie RN  Safety Promotion/Fall Prevention:   activity supervised   assistive device/personal items within reach   clutter free environment maintained   nonskid shoes/slippers when out of bed   mobility aid in reach  Intervention: Prevent Infection  Recent Flowsheet Documentation  Taken 4/2/2024 1600 by Leisa Xie RN  Infection Prevention:   hand hygiene promoted   personal protective equipment utilized     Problem: Adult Inpatient Plan of Care  Goal: Plan of Care Review  Description: The Plan of Care Review/Shift note should be completed every shift.  The Outcome Evaluation is a brief statement about your assessment that the patient is improving, declining, or no change.  This information will be displayed automatically on your shift  note.  4/2/2024 2224 by Leisa Xie RN  Outcome: Progressing  4/2/2024 2224 by Leisa Xie RN  Outcome: Progressing  Goal: Absence of Hospital-Acquired Illness or Injury  Intervention: Identify and Manage Fall Risk  Recent Flowsheet Documentation  Taken 4/2/2024 1600 by Leisa Xie RN  Safety Promotion/Fall Prevention:   activity supervised   assistive device/personal items within reach   clutter free environment maintained   nonskid shoes/slippers when out of bed   mobility aid in reach  Intervention: Prevent Infection  Recent Flowsheet Documentation  Taken 4/2/2024 1600 by Leisa Xie RN  Infection Prevention:   hand hygiene promoted   personal protective equipment utilized     Problem: Risk for Delirium  Goal: Improved Behavioral Control  Intervention: Minimize Safety Risk  Recent Flowsheet Documentation  Taken 4/2/2024 1600 by Leisa Xie RN  Communication Enhancement Strategies:   call light answered in person   family involved in  communication plan   repetition utilized  Enhanced Safety Measures:   pain management   assistive devices when indicated  Goal: Improved Attention and Thought Clarity  Intervention: Maximize Cognitive Function  Recent Flowsheet Documentation  Taken 4/2/2024 1600 by Leisa Xie RN  Reorientation Measures:   clock in view   familiar social contact encouraged   Goal Outcome Evaluation:       Pt refused VS, BS checks, and a couple meds - see chart. Pt very agitated and restless until PRN seroquel given around 18:30, which was effective. Pt still was restless but much more cooperative and redirectable. Pt cannot get comfortable in bed, so getting up frequently. Needs reorientation frequently as pt is forgetful. Ambulating well with SBA.

## 2024-04-04 ENCOUNTER — APPOINTMENT (OUTPATIENT)
Dept: PHYSICAL THERAPY | Facility: HOSPITAL | Age: 89
DRG: 884 | End: 2024-04-04
Payer: COMMERCIAL

## 2024-04-04 ENCOUNTER — APPOINTMENT (OUTPATIENT)
Dept: CT IMAGING | Facility: HOSPITAL | Age: 89
DRG: 884 | End: 2024-04-04
Attending: INTERNAL MEDICINE
Payer: COMMERCIAL

## 2024-04-04 LAB
GLUCOSE BLDC GLUCOMTR-MCNC: 160 MG/DL (ref 70–99)
GLUCOSE BLDC GLUCOMTR-MCNC: 98 MG/DL (ref 70–99)

## 2024-04-04 PROCEDURE — 250N000013 HC RX MED GY IP 250 OP 250 PS 637: Performed by: STUDENT IN AN ORGANIZED HEALTH CARE EDUCATION/TRAINING PROGRAM

## 2024-04-04 PROCEDURE — 97116 GAIT TRAINING THERAPY: CPT | Mod: GP

## 2024-04-04 PROCEDURE — 250N000013 HC RX MED GY IP 250 OP 250 PS 637: Performed by: INTERNAL MEDICINE

## 2024-04-04 PROCEDURE — 70450 CT HEAD/BRAIN W/O DYE: CPT

## 2024-04-04 PROCEDURE — 250N000011 HC RX IP 250 OP 636: Performed by: INTERNAL MEDICINE

## 2024-04-04 PROCEDURE — 250N000013 HC RX MED GY IP 250 OP 250 PS 637: Performed by: HOSPITALIST

## 2024-04-04 PROCEDURE — 120N000001 HC R&B MED SURG/OB

## 2024-04-04 PROCEDURE — 250N000013 HC RX MED GY IP 250 OP 250 PS 637: Performed by: REGISTERED NURSE

## 2024-04-04 RX ORDER — TRAZODONE HYDROCHLORIDE 50 MG/1
50 TABLET, FILM COATED ORAL AT BEDTIME
Status: DISCONTINUED | OUTPATIENT
Start: 2024-04-04 | End: 2024-04-09

## 2024-04-04 RX ORDER — TRAZODONE HYDROCHLORIDE 50 MG/1
50 TABLET, FILM COATED ORAL AT BEDTIME
Status: DISCONTINUED | OUTPATIENT
Start: 2024-04-04 | End: 2024-04-04

## 2024-04-04 RX ADMIN — OLANZAPINE 2.5 MG: 5 TABLET, ORALLY DISINTEGRATING ORAL at 22:44

## 2024-04-04 RX ADMIN — TRAZODONE HYDROCHLORIDE 50 MG: 50 TABLET ORAL at 20:28

## 2024-04-04 RX ADMIN — LEVOTHYROXINE SODIUM 100 MCG: 100 TABLET ORAL at 08:10

## 2024-04-04 RX ADMIN — CEFUROXIME AXETIL 250 MG: 250 TABLET ORAL at 09:33

## 2024-04-04 RX ADMIN — CEFUROXIME AXETIL 250 MG: 250 TABLET ORAL at 20:28

## 2024-04-04 RX ADMIN — ACETAMINOPHEN 1000 MG: 500 TABLET ORAL at 01:06

## 2024-04-04 RX ADMIN — OLANZAPINE 2.5 MG: 5 TABLET, ORALLY DISINTEGRATING ORAL at 17:26

## 2024-04-04 RX ADMIN — ACETAMINOPHEN 1000 MG: 500 TABLET ORAL at 17:36

## 2024-04-04 RX ADMIN — QUETIAPINE FUMARATE 12.5 MG: 25 TABLET ORAL at 03:25

## 2024-04-04 RX ADMIN — CYANOCOBALAMIN TAB 1000 MCG 1000 MCG: 1000 TAB at 09:33

## 2024-04-04 RX ADMIN — DICLOFENAC 2 G: 10 GEL TOPICAL at 11:42

## 2024-04-04 RX ADMIN — LISINOPRIL 5 MG: 5 TABLET ORAL at 09:33

## 2024-04-04 RX ADMIN — QUETIAPINE FUMARATE 12.5 MG: 25 TABLET ORAL at 11:08

## 2024-04-04 ASSESSMENT — ACTIVITIES OF DAILY LIVING (ADL)
ADLS_ACUITY_SCORE: 44
ADLS_ACUITY_SCORE: 43
ADLS_ACUITY_SCORE: 44
ADLS_ACUITY_SCORE: 43
ADLS_ACUITY_SCORE: 45
ADLS_ACUITY_SCORE: 43
ADLS_ACUITY_SCORE: 43
ADLS_ACUITY_SCORE: 44
ADLS_ACUITY_SCORE: 43
ADLS_ACUITY_SCORE: 44
ADLS_ACUITY_SCORE: 42
ADLS_ACUITY_SCORE: 43
ADLS_ACUITY_SCORE: 44
ADLS_ACUITY_SCORE: 43
ADLS_ACUITY_SCORE: 43
ADLS_ACUITY_SCORE: 45
ADLS_ACUITY_SCORE: 44
ADLS_ACUITY_SCORE: 45
ADLS_ACUITY_SCORE: 45
ADLS_ACUITY_SCORE: 43

## 2024-04-04 NOTE — PROGRESS NOTES
[facility]    Medicine Progress Note - Hospitalist Service    Date of Admission:  3/30/2024    Assessment & Plan   Mariela Ortiz is a 89 year old female admitted on 3/30/2024. She  presented to the ER with complaint of flulike symptoms for the past 3 weeks associated with cough, generalized weakness, poor appetite and oral intake, decreased mobility and diarrhea, confusion. Patient received ceftriaxone in the ED and was admitted for generalized weakness secondary to dehydration requiring fluid resuscitation secondary to poor oral intake, acute on chronic diarrhea to rule out acute infectious etiology, and UTI.    Summary: elderly with most likely dementia admitted for COVID and UTI. Worse mental status with not sleeping, restless and agitation on 1:1. Psych and neurology consults. Await TCU placement but no acceptance yet due to being on 1:1.     Generalized weakness due to covid and dehydration, UTI  Failure to thrive   COVID-19 infection   UTI  Acute on chronic diarrhea possibly in setting of viral illness  - flulike symptoms for the past 3 weeks associated with cough, generalized weakness, poor appetite and oral intake, decreased mobility and diarrhea  -Family stated that they initially brought her to the ER 3 days ago at which time during the waiting process she got agitated and they brought her home but since then the symptoms have not improved and progressively worsened  -3 days PTA tested for COVID positive   -Urinalysis with positive nitrates, leukocyte esterase, pyuria and bacteriuria suspicious for UTI, urine cultures +mixed urogenital min;   -Patient received ceftriaxone in the ED. Changed to oral abx  -X-ray did not show any acute process other than some atelectasis and patient did not have any pulmonary complaints  -No need for treatment for COVID for now as patient does not appear to be hypoxic and does not have any pulmonary complaints other than cough   -improving slowly      Chronic lymphocytic leukemia  -follows with hematology once a year and has not required any treatment in the past is undergoing surveillance by her oncologist    Cognitive impairment with dementia?  Metabolic encephalopathy -start on Seroquel and Zyprexia prn  Sleep deprivation   Monitor mental status closely  Try to avoid sedating medication as at risk of delirium  -pt is agitated on and off, worse at night; sleeps only for 2 hours every day for last everal days  -pt is on 1:1 and thus cannot get accepted into TCU  -consulted psychiatrist: trazodone 25 mg at 2000 to help target sleep. Zyprexa ODT 2.5 mg BID PRN for anxiety and agitation with IM option for extreme agitation and aggression that is unsafe for patient and staff. Will increase Trazodone to 50 mg since pt didn't sleep much for last several nights.  -pt is still restless during the night; better during daytime when family member here  -as per family, patient's mental status change was new. [place] been fine not long ago. No formal diagnosis of dementia. Now pt is much worse, with being agitated and restless and not sleeping much. Not sure if covid/UTI can explain this since pt's mental status not improving as expected. Will get ct of head and consult neurologist.    Mild hyperglycemia most likely reactive  Hemoglobin A1c 5.3  Monitor blood sugar levels intermittently    Physical deconditioning/weakness  PT and OT and case management consulted for placement.   Family ok with TCU  Fall precautions    History of hypothyroidism  Continue with levothyroxine  Follow-up TSH    History of hypertension  Continue with lisinopril  Monitor vital signs as per protocol        Diet: Combination Diet Regular Diet Adult  Snacks/Supplements Adult: Ensure Enlive; With Meals    DVT Prophylaxis: Enoxaparin (Lovenox) SQ  Zaman Catheter: Not present  Lines: None     Cardiac Monitoring: None  Code Status: Full Code      Clinically Significant Risk Factors              #  Hypoalbuminemia: Lowest albumin = 3.4 g/dL at 3/31/2024  5:17 AM, will monitor as appropriate     # Hypertension: Noted on problem list     # Dementia: noted on problem list               Disposition Plan      Expected Discharge Date: 04/05/2024    Discharge Delays: 1:1 Sitter still ordered - MD to assess  Placement - TCU    Discharge Comments: Still on 1:1        Barrier: 1:1; restless at night  Plan for TCU when off 1:1    Called daughter Stephanie and updated.Will increase Trazodone because pt didn't sleep much during the night. Stephanie plan to stay in hospital overnight tonight and see if she can help to get pt to sleep.     Tee Birch MD  Hospitalist Service  [facility]  Securely message with [employer]Ta[employer] (more info)  Text page via [employer] Paging/Directory   ______________________________________________________________________    Interval History   Restless at night; diarrhea better, other vs are stable    Physical Exam   Vital Signs: Temp: 97.5  F (36.4  C) Temp src: Oral BP: 128/70 Pulse: 76   Resp: 17 SpO2: 96 % O2 Device: None (Room air)    Weight: 120 lbs 9.6 oz      Medical Decision Making       30 MINUTES SPENT BY ME on the date of service doing chart review, history, exam, documentation & further activities per the note.      Data         Imaging results reviewed over the past 24 hrs:   No results found for this or any previous visit (from the past 24 hour(s)).

## 2024-04-04 NOTE — PLAN OF CARE
Problem: Adult Inpatient Plan of Care  Goal: Plan of Care Review  Description: The Plan of Care Review/Shift note should be completed every shift.  The Outcome Evaluation is a brief statement about your assessment that the patient is improving, declining, or no change.  This information will be displayed automatically on your shift  note.  Outcome: Progressing  Goal: Absence of Hospital-Acquired Illness or Injury  Intervention: Identify and Manage Fall Risk  Recent Flowsheet Documentation  Taken 4/3/2024 1600 by Leisa Xie RN  Safety Promotion/Fall Prevention:   activity supervised   assistive device/personal items within reach   clutter free environment maintained   nonskid shoes/slippers when out of bed   mobility aid in reach   room near nurse's station   bedside attendant  Intervention: Prevent Skin Injury  Recent Flowsheet Documentation  Taken 4/3/2024 1600 by Leisa Xie RN  Body Position: position changed independently  Intervention: Prevent Infection  Recent Flowsheet Documentation  Taken 4/3/2024 1600 by Leisa Xie RN  Infection Prevention:   hand hygiene promoted   rest/sleep promoted     Problem: Adult Inpatient Plan of Care  Goal: Absence of Hospital-Acquired Illness or Injury  Intervention: Identify and Manage Fall Risk  Recent Flowsheet Documentation  Taken 4/3/2024 1600 by Leisa Xie RN  Safety Promotion/Fall Prevention:   activity supervised   assistive device/personal items within reach   clutter free environment maintained   nonskid shoes/slippers when out of bed   mobility aid in reach   room near nurse's station   bedside attendant  Intervention: Prevent Skin Injury  Recent Flowsheet Documentation  Taken 4/3/2024 1600 by Leisa Xie RN  Body Position: position changed independently  Intervention: Prevent Infection  Recent Flowsheet Documentation  Taken 4/3/2024 1600 by Leisa Xie RN  Infection Prevention:   hand hygiene promoted   rest/sleep  promoted   Goal Outcome Evaluation:       Pt more cooperative when daughter is here. Needs lots of encouragement from staff & daughter to take meds. Pt is very restless. PRN PO zyprexa given at HS. Able to obtain  & 153. PRN tylenol given for back pain. Appetite is improving.

## 2024-04-04 NOTE — PLAN OF CARE
"  Problem: Adult Inpatient Plan of Care  Description: The Care Plan Review/Shift Note, Individualized Goals, Hospital-Acquired Illness or Injury, Comfort and Wellbeing, and Transition Planning are the \"Overarching Goals\" and should be updated throughout the hospitalization.  Please hover over the (i) for specific information on each goal topic.  Goal: Plan of Care Review  Description: The Plan of Care Review/Shift note should be completed every shift.  The Outcome Evaluation is a brief statement about your assessment that the patient is improving, declining, or no change.  This information will be displayed automatically on your shift  note.  Outcome: Progressing  Flowsheets (Taken 4/4/2024 0945)  Outcome Evaluation: Pt intake is improving. Ate 50-75% of 3 full meals yesterday and taking at least 1 of 2 ordered ensure/day. Stools more soft/formed x3 between yesterday and today. Prn immodium added but not given yet  Overall Patient Progress: improving     Problem: Oral Intake Inadequate  Goal: Improved Oral Intake  Outcome: Progressing     Problem: Adult Inpatient Plan of Care  Description: The Care Plan Review/Shift Note, Individualized Goals, Hospital-Acquired Illness or Injury, Comfort and Wellbeing, and Transition Planning are the \"Overarching Goals\" and should be updated throughout the hospitalization.  Please hover over the (i) for specific information on each goal topic.  Goal: Plan of Care Review  Description: The Plan of Care Review/Shift note should be completed every shift.  The Outcome Evaluation is a brief statement about your assessment that the patient is improving, declining, or no change.  This information will be displayed automatically on your shift  note.  Recent Flowsheet Documentation  Taken 4/4/2024 0945 by Olga Cook RD  Outcome Evaluation: Pt intake is improving. Ate 50-75% of 3 full meals yesterday and taking at least 1 of 2 ordered ensure/day. Stools more soft/formed x3 between " yesterday and today. Prn immodium added but not given yet  Overall Patient Progress: improving   Goal Outcome Evaluation:           Overall Patient Progress: improvingOverall Patient Progress: improving    Outcome Evaluation: Pt intake is improving. Ate 50-75% of 3 full meals yesterday and taking at least 1 of 2 ordered ensure/day. Stools more soft/formed x3 between yesterday and today. Prn immodium added but not given yet. Noted Dtrs here most of day shift yesterday and pt more cooperative when they are present

## 2024-04-04 NOTE — PLAN OF CARE
"  Problem: Risk for Delirium  Goal: Improved Behavioral Control  Intervention: Minimize Safety Risk  Recent Flowsheet Documentation  Taken 4/4/2024 5644 by Olena Deluna RN  Enhanced Safety Measures: family to remain at bedside     Patient continues to require 1:1 staff. Patient  unsteady on feet and uses cane and furniture walks. Trialed off 1:1 and patient getting up approximately every 10-15 minutes trying to \"pack\" items to go home. Difficult to redirect at times.  Daughter her in the afternoon. 1:1 remians in place for patient safety.                  "

## 2024-04-04 NOTE — PROGRESS NOTES
Care Management Follow Up    Length of Stay (days): 5    Expected Discharge Date: 04/05/2024     Concerns to be Addressed: discharge planning     Patient plan of care discussed at interdisciplinary rounds: Yes    Anticipated Discharge Disposition:  per team      Anticipated Discharge Services:    Anticipated Discharge DME:      Patient/family educated on Medicare website which has current facility and service quality ratings:    Education Provided on the Discharge Plan:    Patient/Family in Agreement with the Plan:      Referrals Placed by CM/SW:    Private pay costs discussed: Not applicable    Additional Information:  SW met with pt daughter Catalina she had questions about plans for discharge, SW explained that pt continues on 1:1 and airborne precautions. Daughter stated she has not been updated by MD and would like to know medications pt is on and what the medical plan is for pt. SW offered to message attending MD. Catalina will talk to nursing as well. Family has been trying to stay with pt.     SAM Bryant

## 2024-04-04 NOTE — PLAN OF CARE
Problem: Adult Inpatient Plan of Care  Goal: Plan of Care Review  Description: The Plan of Care Review/Shift note should be completed every shift.  The Outcome Evaluation is a brief statement about your assessment that the patient is improving, declining, or no change.  This information will be displayed automatically on your shift  note.  Outcome: Progressing     Problem: Gas Exchange Impaired  Goal: Optimal Gas Exchange  Outcome: Progressing     Problem: UTI (Urinary Tract Infection)  Goal: Improved Infection Symptoms  Outcome: Progressing   Goal Outcome Evaluation:                  Pt alert and confused,restless all night,PRN Tylenol for c/o pain and Seroquel for restlessness administered,pt awake all night,1:1 continued for pt safety.

## 2024-04-05 LAB
ERYTHROCYTE [DISTWIDTH] IN BLOOD BY AUTOMATED COUNT: 13.5 % (ref 10–15)
GLUCOSE BLDC GLUCOMTR-MCNC: 104 MG/DL (ref 70–99)
GLUCOSE BLDC GLUCOMTR-MCNC: 173 MG/DL (ref 70–99)
GLUCOSE BLDC GLUCOMTR-MCNC: 208 MG/DL (ref 70–99)
HCT VFR BLD AUTO: 36.9 % (ref 35–47)
HGB BLD-MCNC: 11.6 G/DL (ref 11.7–15.7)
HOLD SPECIMEN: NORMAL
MCH RBC QN AUTO: 31.9 PG (ref 26.5–33)
MCHC RBC AUTO-ENTMCNC: 31.4 G/DL (ref 31.5–36.5)
MCV RBC AUTO: 101 FL (ref 78–100)
PLATELET # BLD AUTO: 157 10E3/UL (ref 150–450)
RBC # BLD AUTO: 3.64 10E6/UL (ref 3.8–5.2)
WBC # BLD AUTO: 12.9 10E3/UL (ref 4–11)

## 2024-04-05 PROCEDURE — 250N000013 HC RX MED GY IP 250 OP 250 PS 637: Performed by: INTERNAL MEDICINE

## 2024-04-05 PROCEDURE — 250N000013 HC RX MED GY IP 250 OP 250 PS 637: Performed by: STUDENT IN AN ORGANIZED HEALTH CARE EDUCATION/TRAINING PROGRAM

## 2024-04-05 PROCEDURE — 99223 1ST HOSP IP/OBS HIGH 75: CPT | Performed by: NURSE PRACTITIONER

## 2024-04-05 PROCEDURE — 99418 PROLNG IP/OBS E/M EA 15 MIN: CPT | Performed by: NURSE PRACTITIONER

## 2024-04-05 PROCEDURE — 250N000011 HC RX IP 250 OP 636: Performed by: REGISTERED NURSE

## 2024-04-05 PROCEDURE — 85027 COMPLETE CBC AUTOMATED: CPT | Performed by: INTERNAL MEDICINE

## 2024-04-05 PROCEDURE — 250N000011 HC RX IP 250 OP 636: Performed by: INTERNAL MEDICINE

## 2024-04-05 PROCEDURE — 36415 COLL VENOUS BLD VENIPUNCTURE: CPT | Performed by: INTERNAL MEDICINE

## 2024-04-05 PROCEDURE — 250N000013 HC RX MED GY IP 250 OP 250 PS 637: Performed by: HOSPITALIST

## 2024-04-05 PROCEDURE — 250N000013 HC RX MED GY IP 250 OP 250 PS 637: Performed by: PSYCHIATRY & NEUROLOGY

## 2024-04-05 PROCEDURE — 99222 1ST HOSP IP/OBS MODERATE 55: CPT | Performed by: PSYCHIATRY & NEUROLOGY

## 2024-04-05 PROCEDURE — 120N000001 HC R&B MED SURG/OB

## 2024-04-05 PROCEDURE — 99233 SBSQ HOSP IP/OBS HIGH 50: CPT | Performed by: INTERNAL MEDICINE

## 2024-04-05 PROCEDURE — 82607 VITAMIN B-12: CPT | Performed by: PSYCHIATRY & NEUROLOGY

## 2024-04-05 RX ORDER — LANOLIN ALCOHOL/MO/W.PET/CERES
3 CREAM (GRAM) TOPICAL EVERY 24 HOURS
Status: DISCONTINUED | OUTPATIENT
Start: 2024-04-05 | End: 2024-04-15 | Stop reason: HOSPADM

## 2024-04-05 RX ORDER — ACETAMINOPHEN 325 MG/10.15ML
1000 LIQUID ORAL EVERY 6 HOURS PRN
Status: DISCONTINUED | OUTPATIENT
Start: 2024-04-05 | End: 2024-04-09

## 2024-04-05 RX ADMIN — CEFUROXIME AXETIL 250 MG: 250 TABLET ORAL at 20:24

## 2024-04-05 RX ADMIN — OLANZAPINE 2.5 MG: 10 INJECTION, POWDER, FOR SOLUTION INTRAMUSCULAR at 12:05

## 2024-04-05 RX ADMIN — ACETAMINOPHEN 1000 MG: 325 SOLUTION ORAL at 14:50

## 2024-04-05 RX ADMIN — LISINOPRIL 5 MG: 5 TABLET ORAL at 08:27

## 2024-04-05 RX ADMIN — TRAZODONE HYDROCHLORIDE 50 MG: 50 TABLET ORAL at 20:23

## 2024-04-05 RX ADMIN — Medication 3 MG: at 20:23

## 2024-04-05 RX ADMIN — ACETAMINOPHEN 1000 MG: 500 TABLET ORAL at 02:55

## 2024-04-05 ASSESSMENT — ACTIVITIES OF DAILY LIVING (ADL)
ADLS_ACUITY_SCORE: 43
ADLS_ACUITY_SCORE: 49
ADLS_ACUITY_SCORE: 45
ADLS_ACUITY_SCORE: 49
ADLS_ACUITY_SCORE: 43
ADLS_ACUITY_SCORE: 45
ADLS_ACUITY_SCORE: 43
ADLS_ACUITY_SCORE: 45
ADLS_ACUITY_SCORE: 43
ADLS_ACUITY_SCORE: 45

## 2024-04-05 NOTE — PLAN OF CARE
Problem: Adult Inpatient Plan of Care  Goal: Absence of Hospital-Acquired Illness or Injury  Outcome: Progressing  Intervention: Identify and Manage Fall Risk  Recent Flowsheet Documentation  Taken 4/5/2024 1000 by Arabella Treviño RN  Safety Promotion/Fall Prevention: activity supervised  Intervention: Prevent Skin Injury  Recent Flowsheet Documentation  Taken 4/5/2024 1000 by Arabella Treviño RN  Body Position: position changed independently  Taken 4/5/2024 0900 by Arabella Treviño RN  Body Position: position changed independently  Intervention: Prevent Infection  Recent Flowsheet Documentation  Taken 4/5/2024 1000 by Arabella Treviño RN  Infection Prevention: hand hygiene promoted     Problem: Risk for Delirium  Goal: Improved Behavioral Control  Outcome: Progressing  Intervention: Prevent and Manage Agitation  Recent Flowsheet Documentation  Taken 4/5/2024 1000 by Arabella Treviño RN  Environment Familiarity/Consistency: daily routine followed  Intervention: Minimize Safety Risk  Recent Flowsheet Documentation  Taken 4/5/2024 1000 by Arabella Treviño RN  Communication Enhancement Strategies: call light answered in person  Enhanced Safety Measures: pain management  Trust Relationship/Rapport: care explained   Goal Outcome Evaluation:       Pt is alert to self, has been mostly agitated , had short periods of sleep and back awake pacing the room wanting to leave, was given IM Zyprexa which didn't seem to help , ate some breakfast and drinking very little, sitter at bedside, refused morning medication, vitals have been stable, she denies any pain. EGG was ordered and pt refused to get the test done, two of her daughters at bedside and was informed about her refusal, tylenol has been changed from tablet to oral solution , daughter says pt likes strawberry ensure and will take medication with it.

## 2024-04-05 NOTE — CONSULTS
[facility]  Neurology Consultation    Patient Name:  Mariela Ortiz  MRN:  [id number]    :  1933  Date of Service:  2024  Primary care provider:  [facility]      Neurology consultation service was asked to see Mariela Ortiz by Dr. Birch to evaluate encephalpathy.    Chief Complaint:  encephalopahty    History of Present Illness:   Mariela Ortiz is a 89 year old female admitted 1 week ago with history of dementia, COVID, who was admitted with weeks of generalized weakness, poor appetite, decreased mobility and confusion.    She has a chart diagnosis of dementia that dates back to at least .  She was also found to have COVID on admission.  From a medicine standpoint patient has been ready to discharge for quite some time but was required a one-to-one especially at night.  She saw psychiatry yesterday who noted she was actually calm and cooperative throughout their exam.  She was able to answer questions appropriately.  However the past couple days she has noted she had to be much worse.  She is being agitated and restless.  She is not sleeping at night.  Her CKD and rhythm is disrupted.  CT of the head was obtained and neurology was consulted.      Daughter states she has memory issues at baseline. Frequent forgetful.  Worse in past 3 weeks since developed cough and stopped eating and drinking.  States her short term memory is poor.  She owns her own home but lives with one of her daughters who works full time.  No safety issues in the home.        ROS patient does not answer questions for review of systems.    PMH  Past Medical History:   Diagnosis Date    Melanoma (H)      Past Surgical History:   Procedure Laterality Date    APPENDECTOMY      CHOLECYSTECTOMY, LAPOROSCOPIC      Cholecystectomy, Laparoscopic    HYSTERECTOMY, PAP NO LONGER INDICATED      menorrhagia    JOINT REPLACEMENT, HIP RT/LT  fall     right    JOINT  "REPLACEMTN, KNEE RT/LT  2002    Joint Replacement knee RT/LT       Medications   I have personally reviewed the patient's medication list.     Allergies  I have personally reviewed the patient's allergy list.     Social History  Does not answer for me    Family History    Does not answer for me      Physical Examination   Vitals: /78   Pulse 82   Temp 98.1  F (36.7  C) (Axillary)   Resp 17   Ht 1.575 m (5' 2\")   Wt 54.7 kg (120 lb 9.6 oz)   SpO2 94%   BMI 22.06 kg/m    General: Sitting in chair, NAD  Head: NC/AT  Eyes: no icterus  Cardiac: Extremities warm, no edema.   Respiratory: non-labored on RA  Skin: No rash or lesion on exposed skin  Psych: Combative, somewhat aggressive, cursing at provider.  Neuro:  Her eyes are closed but she will awaken at least somewhat for me.  She does not follow any commands for me with the exception of sticking out her tongue.  She is able to speak and when conversing her voice is quite clear and loud.  However she does not answer any simple orientation questions to command.  She does not tell me her name.  She is moving all extremities equally and antigravity.  She does withdraw briskly to noxious stimuli.  Deep tendon reflexes are not hyperactive.  She does not have clonus.  No abnormal movements identified.  Her tone is slightly elevated.    Investigations   I have personally reviewed pertinent labs, tests, and radiological imaging. Discussion of notable findings is included under Impression.     CT personally reviewed: Prominent generalized atrophy with marked hippocampal atrophy bilaterally.    Was patient transferred from outside hospital?   No    Impression  #Major neurocognitive disorder- suspected Alzheimers  #abnormal head CT  #delirium  #metabolic encephalopathy    Ms. Handley is a 89-year-old female who who is presenting for encephalopathy in the setting of UTI and COVID.  She is worsened in the past 2 days and appears that her circadian rhythm is disrupted.  " She is not sleeping.  Her history is described by her daughter as well as her head CT is suggestive of a neurodegenerative dementia.  I suspect Alzheimer's disease based on the prominent hippocampal atrophy.  Certainly her prolonged hospitalization and infections put her at risk for delirium.  Ideally I attempted to order EEG, although patient does not cooperating at present.  I have a lower suspicion for ongoing seizures so we will continue to monitor.  Potentially when family is present we can attempt again, we will hold off will manage conservatively and trying to treat delirium.        Recommendations  -Continue treating metabolic causes  - Denying EEG at present, discussed with daughter and will hold off for now, but may need to consider if not improving or certainly if developing any seizure-like activity.  - Delirium precautions  - Scheduled melatonin in the evening ordered.    - vitamin b12 level  - Will see in coming days if worsening will consider EEG, otherwise will allow psych/medicine to treat delirium.      Thank you for involving Neurology in the care of Mariela Ortiz.     Selena Olivas,     Medical Decision Making       NOTE(S)/MEDICAL RECORDS REVIEWED over the past 24 hours: medicine and psych notes  Tests personally interpreted in the past 24 hours:  - HEAD CT showing as above  Tests ORDERED & REVIEWED in the past 24 hours:  - Chest XR  - tsh  -ua  -cbc  - phos, mag  -cmp  SUPPLEMENTAL HISTORY, in addition to the patient's history, over the past 24 hours obtained from:   - daughter  Medical complexity over the past 24 hours:  - Prescription DRUG MANAGEMENT performed

## 2024-04-05 NOTE — PLAN OF CARE
Patient very agitated and combative at shift start. ; backed off and re approached with only one to one sitter in room. Patient given and accepted oral Zyprexa and Tylenol which was effective for a few hours. Patient ate some dinner and fluid intake good. Standby assist to bathroom. Patient Trazadone dose increased and given with much effort ( she reportedly has not been sleeping.) Patient became very restless and not redirectable; trying to leave the room and demanding to go/walk home. Patient's daughter here trying to help. Call placed to Pharmacy - conferred - they to send another dose of Zyprexa.  Problem: Adult Inpatient Plan of Care  Goal: Absence of Hospital-Acquired Illness or Injury  Intervention: Identify and Manage Fall Risk  Recent Flowsheet Documentation  Taken 4/4/2024 1555 by Dee Mistry, RN  Safety Promotion/Fall Prevention:   bedside attendant   activity supervised   assistive device/personal items within reach   clutter free environment maintained   mobility aid in reach   nonskid shoes/slippers when out of bed   safety round/check completed     Problem: Adult Inpatient Plan of Care  Goal: Optimal Comfort and Wellbeing  Intervention: Monitor Pain and Promote Comfort  Recent Flowsheet Documentation  Taken 4/4/2024 1737 by Dee Mistry, RN  Pain Management Interventions: medication (see MAR)  Taken 4/4/2024 1557 by Dee Mistry, RN  Pain Management Interventions: (Tylenol requested;refused) other (see comments)     Problem: Risk for Delirium  Goal: Optimal Coping  Outcome: Not Progressing     Problem: Risk for Delirium  Goal: Improved Behavioral Control  Outcome: Not Progressing     Problem: Pain Acute  Goal: Optimal Pain Control and Function  Intervention: Prevent or Manage Pain  Recent Flowsheet Documentation  Taken 4/4/2024 1555 by Dee Mistry, RN  Medication Review/Management: medications reviewed     Problem: Confusion Acute  Goal: Optimal Cognitive Function  Outcome: Not  Progressing   Goal Outcome Evaluation:

## 2024-04-05 NOTE — PLAN OF CARE
Problem: Adult Inpatient Plan of Care  Goal: Plan of Care Review  Description: The Plan of Care Review/Shift note should be completed every shift.  The Outcome Evaluation is a brief statement about your assessment that the patient is improving, declining, or no change.  This information will be displayed automatically on your shift  note.  Outcome: Progressing   Goal Outcome Evaluation:  Pt refused vitals,very confused,difficult to redirect,family staying with patient,pt was able to sleep bout 5-6 hours,PRN tylenol given per family request,up to bathroom and voided  with assist of one person.

## 2024-04-05 NOTE — PROGRESS NOTES
[facility]    Medicine Progress Note - Hospitalist Service    Date of Admission:  3/30/2024    Assessment & Plan   Mariela Ortiz is a 89 year old female admitted on 3/30/2024. She  presented to the ER with complaint of flulike symptoms for the past 3 weeks associated with cough, generalized weakness, poor appetite and oral intake, decreased mobility and diarrhea, confusion. Patient received ceftriaxone in the ED and was admitted for generalized weakness secondary to dehydration requiring fluid resuscitation secondary to poor oral intake, acute on chronic diarrhea to rule out acute infectious etiology, and possible UTI.    Summary: elderly with most likely dementia admitted for COVID and UTI. Worse mental status with not sleeping, restless and agitation on 1:1. Psych and neurology consults.   Will likely need placement      Generalized weakness due to covid and dehydration, UTI  Failure to thrive   COVID-19 infection   UTI  Acute on chronic diarrhea possibly in setting of viral illness  - flulike symptoms for the past 3 weeks associated with cough, generalized weakness, poor appetite and oral intake, decreased mobility and diarrhea  -Family stated that they initially brought her to the ER 3 days ago at which time during the waiting process she got agitated and they brought her home but since then the symptoms have not improved and progressively worsened  -3 days PTA tested for COVID positive   -Urinalysis with positive nitrates, leukocyte esterase, pyuria and bacteriuria suspicious for UTI, urine cultures +mixed urogenital min; repeat UA today   -Patient received ceftriaxone in the ED. Changed to oral abx  -X-ray did not show any acute process other than some atelectasis and patient did not have any pulmonary complaints  -No need for treatment for COVID for now as patient does not appear to be hypoxic and does not have any pulmonary complaints other than cough   -improving slowly      Chronic lymphocytic leukemia  -follows with hematology once a year and has not required any treatment in the past is undergoing surveillance by her oncologist    Possible underlying cognitive impairment  Metabolic encephalopathy -start on Seroquel and Zyprexia prn  Sleep deprivation   Monitor mental status closely  Try to avoid sedating medication as at risk of delirium  -pt is agitated on and off, worse at night; sleeps only for 2 hours every day for last everal days  -pt is on 1:1 and thus cannot get accepted into TCU  -consulted psychiatrist: recommended trazodone 25 mg, now dose increased to 50 mg.  Zyprexa ODT 2.5 mg BID PRN for anxiety and agitation with IM option for extreme agitation and aggression that is unsafe for patient and staff.   -as per family, patient's mental status change was new. [place] been fine not long ago. No formal diagnosis of dementia. Now pt is much worse, with being agitated and restless and not sleeping much. Not sure if covid/UTI can explain this since pt's mental status not improving as expected.  CT head demonstrated severe presumed chronic small vessel ischemic changes with moderate otherwise generalized volume loss, suspect might have had underlying cognitive impairment    Mild hyperglycemia most likely reactive  Hemoglobin A1c 5.3  Monitor blood sugar levels intermittently    Physical deconditioning/weakness  PT and OT and case management consulted for placement.   Family ok with TCU  Fall precautions    History of hypothyroidism  Continue with levothyroxine  Follow-up TSH    History of hypertension  Continue with lisinopril  Monitor vital signs as per protocol          Diet: Combination Diet Regular Diet Adult  Snacks/Supplements Adult: Ensure Enlive; With Meals    DVT Prophylaxis: Enoxaparin (Lovenox) SQ  Zaman Catheter: Not present  Lines: None     Cardiac Monitoring: None  Code Status: Full Code      Clinically Significant Risk Factors              # Hypoalbuminemia: Lowest  albumin = 3.4 g/dL at 3/31/2024  5:17 AM, will monitor as appropriate     # Hypertension: Noted on problem list     # Dementia: noted on problem list               Disposition Plan      Expected Discharge Date: 04/08/2024    Discharge Delays: 1:1 Sitter still ordered - MD to assess  Placement - TCU    Discharge Comments: Patient needs to be off 1:1 to go to TCU            Janee Stanford MD  Hospitalist Service  [facility]  Securely message with [employer] (more info)  Text page via [employer] Paging/Directory   ______________________________________________________________________    Interval History   Patient is sleeping at this time.  Daughter Stephanie is at bedside, she states that patient was very confused, agitated and combative last night.  She slept for about 5 hours which is the longest since she has been admitted to the hospital.  She continues to have congested cough.  She does not eat much.  She has loose stool.  Per daughter, she has chronic pain.      Physical Exam   Vital Signs: Temp: 98.1  F (36.7  C) Temp src: Axillary BP: 125/78 Pulse: 82   Resp: 17 SpO2: 94 % O2 Device: None (Room air)    Weight: 120 lbs 9.6 oz    General Appearance: No acute distress, sitting in a chair  Respiratory: Respirations unlabored, congested cough noted  Cardiovascular: Regular rate and rhythm.  Normal S1-S2.  1+ lower extremity edema  Other: Sleepy    Medical Decision Making       55 MINUTES SPENT BY ME on the date of service doing chart review, history, exam, documentation & further activities per the note.  MANAGEMENT DISCUSSED with the following over the past 24 hours: Patient, patient's daughter Stephanie at bedside, RN, RN CM       Data     I have personally reviewed the following data over the past 24 hrs:    12.9 (H)  \   11.6 (L)   / 157     N/A N/A N/A /  104 (H)   N/A N/A N/A \       Imaging results reviewed over the past 24 hrs:   Recent Results (from the past 24 hour(s))   CT Head w/o Contrast     Narrative    EXAM: CT HEAD W/O CONTRAST  LOCATION: [facility]  DATE: 4/4/2024    INDICATION: altered mental status  COMPARISON: 06/26/2016.  TECHNIQUE: Routine CT Head without IV contrast. Multiplanar reformats. Dose reduction techniques were used.    FINDINGS:  INTRACRANIAL CONTENTS: No intracranial hemorrhage, extraaxial collection, or mass effect.  No CT evidence of acute infarct. Severe presumed chronic small vessel ischemic changes. Moderate generalized volume loss. No hydrocephalus.     VISUALIZED ORBITS/SINUSES/MASTOIDS: No intraorbital abnormality. Moderate mucosal fluid/debris scattered about the paranasal sinuses. No middle ear or mastoid effusion.    BONES/SOFT TISSUES: No acute abnormality.      Impression    IMPRESSION:  1.  No evidence of acute hemorrhage, hydrocephalus or transcortical infarct.    2.  Moderate bilateral frontal and ethmoid sinus fluid/debris.

## 2024-04-05 NOTE — PROGRESS NOTES
Care Management Follow Up    Length of Stay (days): 6    Expected Discharge Date: 04/06/2024     Concerns to be Addressed: Needs to be off 1:1  Patient plan of care discussed at interdisciplinary rounds: Yes    Anticipated Discharge Disposition:  TCU     Anticipated Discharge Services:  TCU  Anticipated Discharge DME:  TBD    Patient/family educated on Medicare website which has current facility and service quality ratings:  yes  Education Provided on the Discharge Plan:  yes  Patient/Family in Agreement with the Plan:  yes    Referrals Placed by CM/SW:  [facility]  Private pay costs discussed: Not applicable    Additional Information:  Previously assessed  Spoke with [facility] who reports they could have a bed available for patient on Monday if patient can successfully be off the 1:1. CM to call Monday and update [facility] on client progress. No weekend admissions or bed availability.    Spoke with daughter Stephanie who reported that they were consulting with palliative today. Discussed [facility] with daughter and daughter is worried about her mom not coming off the 1:1. She would like a referral sent to [facility] in [place] as it is closer to home.    Referral sent to [facility]    Palliative met with family and they will meet again at noon if SW can attend, they are invited. They changed status to DNR/DNI but would still like to consider TCU. They will think about other options over the weekend but are still hopeful that she will recover.    Jojo Astorga, JUSTIN  04/05/24  10:22 AM

## 2024-04-05 NOTE — CONSULTS
Palliative Care Consultation Note  [facility]      Patient: Mariela Ortiz  Date of Admission:  3/30/2024    Requesting Clinician / Team: Dr Tatum  Reason for consult: Goals of care       Recommendations & Counseling     GOALS OF CARE:   Restorative. Her 2 dtrs Stephanie and Lyla hope that Mariela can recover and return to her baseline level of function (a week ago, pt could've understood the palliative care discussion, would've been comfortable discussing serious matters, and would've had opinions she would want to share.  Continue current treatments for now. Dtrs would appreciate a daily clinical update call from MD if possible.   Ok for TCU when able (needs to be off 1:1 sitter).  Dtrs aware that pt may need a discussion re: her residence and whether she needs either more services at her private home (a different dtr lives with her) or an alternative residential situation.   Per dtrs, she had wanted to come to the hospital and at this time, dtrs think that she would be open to future hospitalizations (although do confirm that pt does not really like it here and feels she is being kept here against her will). We did discuss the role of an informational hospice consult.  Daughters are aware that I could order it today to have that conversation sometime this weekend or they can think it over and let the hospitalist know if they wish for this this weekend.  They understand that they would meet with the care management staff member to talk about which hospice agency they would want to have an informational consult from.  They also understand that they do not need to do anything regarding this during this admission and they can ponder it further and if desired can talk with her primary care provider regarding this.  Palliative Care will be seeing again next Monday at 1200. Hospitalist and CM welcome to join.     ADVANCE CARE PLANNING:  Patient has an advance directive dated 1999.  Primary  Health Care Agent Stephanie.  Alternate(s) Dtr Lyla.   There is no POLST form on file, recommend to complete prior to DC.  Code status: After indepth discussion with pts 2 dtrs/both of whom are her HCAs, they elected to change her code status to DNR/DNI or allow natural death. Her HCD does authorize them to make important decisions.     MEDICAL MANAGEMENT:   Chronic pain, bilaterally, takes Tylenol at home.  Reportedly does not like to take pills.  Has not her knee pain, she is not as active as her daughters would wish for her. LFTs normal.   -Agree with Tylenol. Could consider scheduling it eg 1000 mg BID to see if helps her delirium.    Altered memory dating back several years, daughters have seen dementia on her notes in her chart but are not that familiar with this diagnosis.  They have not directly spoken with neurology who saw the patient during this admission.  -Appreciate Hospitalist and Neurology communication with dtrs re: her cognitive changes/findings/what to expect.     Delirium, multiple likely etiologies (COVID infection, urinary tract infection, sleep deprivation, new environment,? alcohol abstinence/withdrawal).  Did spend time talking with daughters regarding what to expect related to delirium as well as the higher mortality at 1 year, important that they are aware of this.Family are not 100% exactly how much beer she consumes at home (might open 2-3 cans per day yet reportedly the cans are often not empty).   -Appreciate psychiatry and neurology evaluation and recommendations.    PSYCHOSOCIAL/SPIRITUAL SUPPORT:  Per dtrs, pt does not identify as spiritual.     Palliative Care will continue to follow Mariela. Thank you for the consult and allowing us to aid in the care of Mariela Ortiz.    Case discussed with MD, RN, and CM.    Ponce Nugent NP  Securely message with Vocera (more info)  Text page via [employer]     120 minutes of prolonged service, in a non face to face time  (face to face w/ 1 dtr and, and 1 dtr by telephone; but did not see pt face to face).  An additional 30 minutes was spent in chart review, conversation with RN and CM, post meeting communication with MD, and documentation. 150 minutes.       Assessment      Mariela Ortiz is a 89 year old female with a past medical history of  HTN, BPPV, dementia, HLD  who presented on March 27th with flulike symptoms for the past 3 weeks associated with cough, generalized weakness, poor appetite and oral intake, decreased mobility and diarrhea, confusion. +COVID/UTI.       Today, the patient was seen for: Goals of care    Neurology consult today:  Impression  #Major neurocognitive disorder- suspected Alzheimers  #abnormal head CT  #delirium  #metabolic encephalopathy     Ms. Handley is a 89-year-old female who who is presenting for encephalopathy in the setting of UTI and COVID.  She is worsened in the past 2 days and appears that her circadian rhythm is disrupted.  She is not sleeping.  Her history is described by her daughter as well as her head CT is suggestive of a neurodegenerative dementia.  I suspect Alzheimer's disease based on the prominent hippocampal atrophy.  Certainly her prolonged hospitalization and infections put her at risk for delirium.  Ideally I attempted to order EEG, although patient does not cooperating at present.  I have a lower suspicion for ongoing seizures so we will continue to monitor.  Potentially when family is present we can attempt again, we will hold off will manage conservatively and trying to treat delirium.     Recommendations  -Continue treating metabolic causes  - Denying EEG at present, discussed with daughter and will hold off for now, but may need to consider if not improving or certainly if developing any seizure-like activity.  - Delirium precautions  - Scheduled melatonin in the evening ordered.    - vitamin b12 level  - Will see in coming days if worsening will consider EEG, otherwise  will allow psych/medicine to treat delirium.      Palliative Care Summary:   Met with dtrs together, Lyla (in person) and Stephanie (by telephone).     Prognosis, Goals, & Planning:    Functional Status just prior to this current hospitalization:  Ambulates, with a cane and walker    Prognosis, Goals, and/or Advance Care Planning:  Indepth discussion    Code Status was addressed today:   Discussed and changed code status today, after indepth discussion with pts dtrs re: pts values/wishes, outcomes. If pt improves cognitively, dtrs are open to talking with her to make sure she is in agreement and are aware pt could change her mind, can change back to full code.     Patient's decision making preferences: with input from medical clinicians and loved ones        Patient has decision-making capacity today for complex decisions:Unreliable            Coping, Meaning, & Spirituality:   Mood, coping, and/or meaning in the context of serious illness were addressed today: Yes. 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     Social:    back in 1979. Worked 4 jobs and retired at age 59. 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 Is frustrated that she is not to do some of the construction/building that she enjoys. She lives in her own home, 1 level. With one dtr Judith.     Medications:  I have reviewed this patient's medication profile and medications from this hospitalization.     ROS:  Comprehensive ROS is reviewed and is negative except as here & per HPI:     Physical Exam   Vital Signs with Ranges  Temp:  [98.1  F (36.7  C)] 98.1  F (36.7  C)  Pulse:  [82] 82  Resp:  [17] 17  BP: (125-152)/(78-82) 125/78  SpO2:  [94 %] 94 %  120 lbs 9.6 oz    PHYSICAL EXAM:  Pt was observed sleeping, sitting in her recliner.     Data reviewed:  Results for orders placed or performed during the hospital encounter of 03/30/24 (from the past 24  hour(s))   Glucose by meter   Result Value Ref Range    GLUCOSE BY METER POCT 160 (H) 70 - 99 mg/dL   CT Head w/o Contrast    Narrative    EXAM: CT HEAD W/O CONTRAST  LOCATION: [facility]  DATE: 4/4/2024    INDICATION: altered mental status  COMPARISON: 06/26/2016.  TECHNIQUE: Routine CT Head without IV contrast. Multiplanar reformats. Dose reduction techniques were used.    FINDINGS:  INTRACRANIAL CONTENTS: No intracranial hemorrhage, extraaxial collection, or mass effect.  No CT evidence of acute infarct. Severe presumed chronic small vessel ischemic changes. Moderate generalized volume loss. No hydrocephalus.     VISUALIZED ORBITS/SINUSES/MASTOIDS: No intraorbital abnormality. Moderate mucosal fluid/debris scattered about the paranasal sinuses. No middle ear or mastoid effusion.    BONES/SOFT TISSUES: No acute abnormality.      Impression    IMPRESSION:  1.  No evidence of acute hemorrhage, hydrocephalus or transcortical infarct.    2.  Moderate bilateral frontal and ethmoid sinus fluid/debris.   Glucose by meter   Result Value Ref Range    GLUCOSE BY METER POCT 104 (H) 70 - 99 mg/dL   CBC with platelets   Result Value Ref Range    WBC Count 12.9 (H) 4.0 - 11.0 10e3/uL    RBC Count 3.64 (L) 3.80 - 5.20 10e6/uL    Hemoglobin 11.6 (L) 11.7 - 15.7 g/dL    Hematocrit 36.9 35.0 - 47.0 %     (H) 78 - 100 fL    MCH 31.9 26.5 - 33.0 pg    MCHC 31.4 (L) 31.5 - 36.5 g/dL    RDW 13.5 10.0 - 15.0 %    Platelet Count 157 150 - 450 10e3/uL   Extra Green Top Tube (LAB USE ONLY)   Result Value Ref Range    Hold Specimen JIC

## 2024-04-06 LAB
CREAT SERPL-MCNC: 0.67 MG/DL (ref 0.51–0.95)
EGFRCR SERPLBLD CKD-EPI 2021: 83 ML/MIN/1.73M2
GLUCOSE BLDC GLUCOMTR-MCNC: 103 MG/DL (ref 70–99)
GLUCOSE BLDC GLUCOMTR-MCNC: 140 MG/DL (ref 70–99)
PLATELET # BLD AUTO: 197 10E3/UL (ref 150–450)
VIT B12 SERPL-MCNC: 1467 PG/ML (ref 232–1245)

## 2024-04-06 PROCEDURE — 120N000001 HC R&B MED SURG/OB

## 2024-04-06 PROCEDURE — 250N000013 HC RX MED GY IP 250 OP 250 PS 637: Performed by: PSYCHIATRY & NEUROLOGY

## 2024-04-06 PROCEDURE — 250N000013 HC RX MED GY IP 250 OP 250 PS 637: Performed by: INTERNAL MEDICINE

## 2024-04-06 PROCEDURE — 99231 SBSQ HOSP IP/OBS SF/LOW 25: CPT | Performed by: PSYCHIATRY & NEUROLOGY

## 2024-04-06 PROCEDURE — 250N000013 HC RX MED GY IP 250 OP 250 PS 637: Performed by: STUDENT IN AN ORGANIZED HEALTH CARE EDUCATION/TRAINING PROGRAM

## 2024-04-06 PROCEDURE — 82565 ASSAY OF CREATININE: CPT | Performed by: INTERNAL MEDICINE

## 2024-04-06 PROCEDURE — 36415 COLL VENOUS BLD VENIPUNCTURE: CPT | Performed by: INTERNAL MEDICINE

## 2024-04-06 PROCEDURE — 85049 AUTOMATED PLATELET COUNT: CPT | Performed by: INTERNAL MEDICINE

## 2024-04-06 PROCEDURE — 250N000011 HC RX IP 250 OP 636: Performed by: INTERNAL MEDICINE

## 2024-04-06 PROCEDURE — 250N000013 HC RX MED GY IP 250 OP 250 PS 637: Performed by: HOSPITALIST

## 2024-04-06 PROCEDURE — 99233 SBSQ HOSP IP/OBS HIGH 50: CPT | Performed by: INTERNAL MEDICINE

## 2024-04-06 RX ADMIN — TRAZODONE HYDROCHLORIDE 50 MG: 50 TABLET ORAL at 20:41

## 2024-04-06 RX ADMIN — LEVOTHYROXINE SODIUM 100 MCG: 100 TABLET ORAL at 08:30

## 2024-04-06 RX ADMIN — Medication 3 MG: at 20:42

## 2024-04-06 RX ADMIN — ACETAMINOPHEN 1000 MG: 325 SOLUTION ORAL at 13:06

## 2024-04-06 RX ADMIN — QUETIAPINE FUMARATE 12.5 MG: 25 TABLET ORAL at 23:54

## 2024-04-06 RX ADMIN — CYANOCOBALAMIN TAB 1000 MCG 1000 MCG: 1000 TAB at 08:30

## 2024-04-06 RX ADMIN — ENOXAPARIN SODIUM 40 MG: 40 INJECTION SUBCUTANEOUS at 20:47

## 2024-04-06 RX ADMIN — LISINOPRIL 5 MG: 5 TABLET ORAL at 08:30

## 2024-04-06 ASSESSMENT — ACTIVITIES OF DAILY LIVING (ADL)
ADLS_ACUITY_SCORE: 48
ADLS_ACUITY_SCORE: 49
ADLS_ACUITY_SCORE: 48
ADLS_ACUITY_SCORE: 49
ADLS_ACUITY_SCORE: 48
ADLS_ACUITY_SCORE: 49
ADLS_ACUITY_SCORE: 48
ADLS_ACUITY_SCORE: 49
ADLS_ACUITY_SCORE: 48
ADLS_ACUITY_SCORE: 49
ADLS_ACUITY_SCORE: 48
ADLS_ACUITY_SCORE: 49
ADLS_ACUITY_SCORE: 48
ADLS_ACUITY_SCORE: 49

## 2024-04-06 NOTE — PROGRESS NOTES
[facility]    Medicine Progress Note - Hospitalist Service    Date of Admission:  3/30/2024    Assessment & Plan   Mariela Ortiz is a 89 year old female admitted on 3/30/2024. She  presented to the ER with complaint of flulike symptoms for the past 3 weeks associated with cough, generalized weakness, poor appetite and oral intake, decreased mobility and diarrhea, confusion. Patient received ceftriaxone in the ED and was admitted for generalized weakness secondary to dehydration requiring fluid resuscitation secondary to poor oral intake, acute on chronic diarrhea to rule out acute infectious etiology, and possible UTI.    Summary: elderly with most likely dementia admitted for COVID and UTI. Worse mental status with not sleeping, restless and agitation on 1:1. Psych and neurology consults appreciated.  Now family is thinking about taking her back home but waiting for delirium to resolve    Generalized weakness due to covid and dehydration, UTI  Failure to thrive   COVID-19 infection   UTI  Acute on chronic diarrhea possibly in setting of viral illness  - flulike symptoms for the past 3 weeks associated with cough, generalized weakness, poor appetite and oral intake, decreased mobility and diarrhea.  Home COVID test +3 days PTA  -No treatment was recommended for COVID infection as illness onset was not clear and patient has not been hypoxic  -Completed antibiotic course for possible UTI (urine culture with >100K mixture of pathogens).  Unable to obtain repeat UA as urine has been mixed with stool    Chronic lymphocytic leukemia  -follows with hematology once a year and has not required any treatment in the past   -is undergoing surveillance by her oncologist    Suspected underlying cognitive impairment  Metabolic encephalopathy -start on Seroquel and Zyprexia prn  Sleep deprivation   Vitamin B12 is not low  Low suspicion for seizures  Continue supportive cares    Mild hyperglycemia most  likely reactive  Hemoglobin A1c 5.3  Monitor blood sugar levels intermittently    Physical deconditioning/weakness  Continue PT and OT   Family is thinking discharging home  Fall precautions    History of hypothyroidism  Continue with levothyroxine  Follow-up TSH    History of hypertension  Continue with lisinopril  Monitor vital signs as per protocol          Diet: Combination Diet Regular Diet Adult  Snacks/Supplements Adult: Ensure Enlive; With Meals    DVT Prophylaxis: Enoxaparin (Lovenox) SQ  Zaman Catheter: Not present  Lines: None     Cardiac Monitoring: None  Code Status: No CPR- Do NOT Intubate      Clinically Significant Risk Factors              # Hypoalbuminemia: Lowest albumin = 3.4 g/dL at 3/31/2024  5:17 AM, will monitor as appropriate     # Hypertension: Noted on problem list     # Dementia: noted on problem list               Disposition Plan      Expected Discharge Date: 04/08/2024    Discharge Delays: 1:1 Sitter still ordered - MD to assess  Placement - TCU    Discharge Comments: Patient needs to be off 1:1 to go to TCU            Janee Stanford MD  Hospitalist Service  [facility]  Securely message with [employer] (more info)  Text page via [employer] Paging/Directory   ______________________________________________________________________    Interval History   Patient is much more alert and cooperative today  Denies shortness of breath, cough or any pain  Unable to obtain repeat UA    Physical Exam   Vital Signs: Temp: 97.6  F (36.4  C) Temp src: Oral BP: (!) 162/60 Pulse: 68   Resp: 18 SpO2: 99 % O2 Device: None (Room air)    Weight: 120 lbs 9.6 oz    General Appearance: No acute distress, sitting in a recliner  Respiratory: Respirations unlabored  Other: Awake, alert, very hard of hearing    Medical Decision Making       50  MINUTES SPENT BY ME on the date of service doing chart review, history, exam, documentation & further activities per the note.  MANAGEMENT DISCUSSED  with the following over the past 24 hours: Patient, nursing staff, RN CM       Data         Imaging results reviewed over the past 24 hrs:   No results found for this or any previous visit (from the past 24 hour(s)).

## 2024-04-06 NOTE — PLAN OF CARE
Problem: Comorbidity Management  Goal: Blood Glucose Levels Within Targeted Range  4/6/2024 0654 by Marni Urena RN  Outcome: Progressing  4/5/2024 1920 by Marni Urena RN  Outcome: Progressing  Intervention: Monitor and Manage Glycemia  Recent Flowsheet Documentation  Taken 4/6/2024 0140 by Marni Urena RN  Medication Review/Management: medications reviewed  Goal: Maintenance of Asthma Control  4/6/2024 0654 by Marni Urena RN  Outcome: Progressing  4/5/2024 1920 by Marni Urena RN  Outcome: Progressing  Intervention: Maintain Asthma Symptom Control  Recent Flowsheet Documentation  Taken 4/6/2024 0140 by Marni Urena RN  Medication Review/Management: medications reviewed  Goal: Maintenance of Behavioral Health Symptom Control  4/6/2024 0654 by Marni Urena RN  Outcome: Progressing  4/5/2024 1920 by Marni Urena RN  Outcome: Progressing  Intervention: Maintain Behavioral Health Symptom Control  Recent Flowsheet Documentation  Taken 4/6/2024 0140 by Marni Urena RN  Medication Review/Management: medications reviewed  Goal: Maintenance of COPD Symptom Control  4/6/2024 0654 by Marni Urena RN  Outcome: Progressing  4/5/2024 1920 by Marni Urena RN  Outcome: Progressing  Intervention: Maintain COPD Symptom Control  Recent Flowsheet Documentation  Taken 4/6/2024 0140 by Marni Urena RN  Medication Review/Management: medications reviewed  Goal: Maintenance of Heart Failure Symptom Control  4/6/2024 0654 by Marni Urena RN  Outcome: Progressing  4/5/2024 1920 by Marni Urena RN  Outcome: Progressing  Intervention: Maintain Heart Failure Management  Recent Flowsheet Documentation  Taken 4/6/2024 0140 by Marni Urena RN  Medication Review/Management: medications reviewed  Goal: Blood Pressure in Desired Range  4/6/2024 0654 by Marni Urena RN  Outcome: Progressing  4/5/2024 1920 by Marni Urena RN  Outcome: Progressing  Intervention: Maintain Blood  Pressure Management  Recent Flowsheet Documentation  Taken 4/6/2024 0140 by Marni Urena RN  Medication Review/Management: medications reviewed  Goal: Maintenance of Osteoarthritis Symptom Control  4/6/2024 0654 by Marni Urena RN  Outcome: Progressing  4/5/2024 1920 by Marni Urena RN  Outcome: Progressing  Intervention: Maintain Osteoarthritis Symptom Control  Recent Flowsheet Documentation  Taken 4/6/2024 0140 by Marni Urena RN  Assistive Device Utilized: cane  Activity Management:   activity adjusted per tolerance   ambulated to bathroom   up in chair  Medication Review/Management: medications reviewed  Goal: Bariatric Home Regimen Maintained  4/6/2024 0654 by Marni Urena RN  Outcome: Progressing  4/5/2024 1920 by Marni Urena RN  Outcome: Progressing  Intervention: Maintain and Manage Postbariatric Surgery Care  Recent Flowsheet Documentation  Taken 4/6/2024 0140 by Marni Urena RN  Medication Review/Management: medications reviewed  Goal: Maintenance of Seizure Control  4/6/2024 0654 by Marni Urena RN  Outcome: Progressing  4/5/2024 1920 by Marni Urena RN  Outcome: Progressing  Intervention: Maintain Seizure Symptom Control  Recent Flowsheet Documentation  Taken 4/6/2024 0140 by Marni Urena RN  Medication Review/Management: medications reviewed     Problem: Confusion Acute  Goal: Optimal Cognitive Function  4/6/2024 0654 by Marni Urena RN  Outcome: Progressing  4/5/2024 1920 by Marni Urena RN  Outcome: Progressing     Problem: UTI (Urinary Tract Infection)  Goal: Improved Infection Symptoms  4/6/2024 0654 by Marni Urena RN  Outcome: Progressing  4/5/2024 1920 by Marni Urena RN  Outcome: Progressing     Problem: Oral Intake Inadequate  Goal: Improved Oral Intake  4/6/2024 0654 by Marni Urena RN  Outcome: Progressing  4/5/2024 1920 by Marni Urena RN  Outcome: Progressing   Goal Outcome Evaluation:       Pt had period of confusion, wanting  to leave in the middle of the night, easily redirected by staff, reminds on 1:1 for safety. Urine sample not obtains Pt not cooperative with cares. Will continue to try/pass on to incoming.

## 2024-04-06 NOTE — PROGRESS NOTES
"[facility]  Neurology Consultation - Progress Note    Patient Name:  Mariela Ortiz  Date of Service:  April 6, 2024    Subjective:    Much better today.  No one-to-one in room.  Patient is much more alert, and cooperative.  Was seen by palliative care yesterday.  She did refuse EEG, but suspect is not needed    Objective:    Vitals: BP (!) 162/60 (BP Location: Right arm)   Pulse 68   Temp 97.6  F (36.4  C) (Oral)   Resp 18   Ht 1.575 m (5' 2\")   Wt 54.7 kg (120 lb 9.6 oz)   SpO2 99%   BMI 22.06 kg/m    General: sitting in chair NAD  Head: Atraumatic, normocephalic   Cardiac: no lower extremity edema  Neurologic:  Awake, alert, attentive, oriented to person and month.  Knows he is in the hospital but is has trouble naming which hospital.  Able to follow all simple commands for me.  Able to name simple objects such as spoon and glasses.  No aphasia appreciated.  No dysarthria.  Face is grossly symmetric, gaze is conjugate.  She is at least 4+ out of 5 strength in her bilateral upper and lower extremities.  She is hard of hearing so has some difficulty with more complex commands/instructions.  Deep tendon reflexes are reduced but present in the upper extremities.  No abnormal movements identified.  Tone is slightly elevated.    Pertinent Investigations:    I have personally reviewed most recent and pertinent labs, tests, and radiological images.     Vitamin B12: 1467  Glucose 103    Assessment  #Major neurocognitive disorder- suspected Alzheimers  #abnormal head CT  #delirium  #metabolic encephalopathy    Ms. Ortiz is a 89-year-old female with known dementia, which I suspect is Alzheimer's based on head CT atrophy pattern who neurology is consulted for encephalopathy.  Her mental status is substantially improved today.  She is noted to be smiling and interactive.  She does have some short-term memory loss which is not unexpected given her daughter's report of her baseline " and head CT.  At this time I do not think she needs EEG.  I have a very low suspicion for ongoing seizures.  Continue to treat delirium and any metabolic disturbances as possible.  Suspect primary  at present is delirium, but UTI and COVID likely precipitated hospitalization.   Recommendations:   - Delirium precautions  - Continue melatonin scheduled while admitted  -No need for EEG at present  -Will sign off, please call if new neurologic deficits, mental status changes, or additional questions and concerns.    Thank you for involving Neurology in the care of Mariela Ortiz.      Selena Olivas,    Medical Decision Making       NOTE(S)/MEDICAL RECORDS REVIEWED over the past 24 hours: palliative care note  Tests ORDERED & REVIEWED in the past 24 hours:  - vitamin b12 and glucose

## 2024-04-06 NOTE — PROGRESS NOTES
"Care Management Follow Up    Length of Stay (days): 7    Expected Discharge Date: 04/08/2024     Concerns to be Addressed: discharge planning  to go home at this time with 24/7 care  Patient plan of care discussed at interdisciplinary rounds: Yes    Anticipated Discharge Disposition: home with care from her daughters     Anticipated Discharge Services:  outpatient palliative care consult to follow  Anticipated Discharge DME:  none    Patient/family educated on Medicare website which has current facility and service quality ratings:  yes  Education Provided on the Discharge Plan:  yes- family in agreement that they would like to go home and provide care for her there at this time vs. TCU. Potential for outpatient palliative care consult  Patient/Family in Agreement with the Plan:  yes- family to transport and provide 24/7 care at home    Referrals Placed by CM/SW:  none at this time  Private pay costs discussed: Not applicable    Additional Information:    Spoke with daughter emmanuel-    Discussed the choice for the family to bring the patient home vs. TCU and how they feel the patient may thrive at home vs. A facility setting. Daughter Judith lives with patient in her home.    Provided resources for outpatient placement for SNF and elder law  Care conference with palliative care and CM on Monday 4/8.     Social HX: Mariela was found to be COVID + on 3/27/24. Family had brought her to [facility] ER on 3/27/24, but family took her home because they didn't want to board in the ER. They took her home, but she was \"too much work at home with her worsening confusion, so brought her to [facility] ER.\"     She lives in a house with her daughter Judith. It is Mariela's house. Judith is \"not with her during the daytimes when she is working. Normally this is fine that she is alone sometimes, but over the past few days she has had increased confusion and increased agitation.\" \"She most often uses the cane. Also has a 4WW " "if needed\".     She is independent with most ADLs and gets help with all IADLs.      CM will continue to follow care progression and aide in discharge planning as needed.     Aylin Magana RN      "

## 2024-04-06 NOTE — PLAN OF CARE
"  Problem: Adult Inpatient Plan of Care  Goal: Plan of Care Review  Description: The Plan of Care Review/Shift note should be completed every shift.  The Outcome Evaluation is a brief statement about your assessment that the patient is improving, declining, or no change.  This information will be displayed automatically on your shift  note.  Outcome: Progressing  Goal: Patient-Specific Goal (Individualized)  Description: You can add care plan individualizations to a care plan. Examples of Individualization might be:  \"Parent requests to be called daily at 9am for status\", \"I have a hard time hearing out of my right ear\", or \"Do not touch me to wake me up as it startles  me\".  Outcome: Progressing  Goal: Absence of Hospital-Acquired Illness or Injury  Outcome: Progressing  Intervention: Prevent Infection  Recent Flowsheet Documentation  Taken 4/5/2024 1603 by Marni Urena, RN  Infection Prevention: hand hygiene promoted  Goal: Optimal Comfort and Wellbeing  Outcome: Progressing  Goal: Readiness for Transition of Care  Outcome: Progressing     Problem: Comorbidity Management  Goal: Blood Pressure in Desired Range  Outcome: Progressing  Goal: Blood Glucose Levels Within Targeted Range  Outcome: Progressing     Problem: UTI (Urinary Tract Infection)  Goal: Improved Infection Symptoms  Outcome: Progressing     Problem: Oral Intake Inadequate  Goal: Improved Oral Intake  Outcome: Progressing   Goal Outcome Evaluation:      Pt alert and oriented with intermittent confusion, trying to leave for home, redirected. 1:1 continues for Pt safety, ate 50% of dinner and drink entire bottle of chocolates ensure, unable to collect urine sample, each time pt urinate, urine got contaminated with stool.                     "

## 2024-04-06 NOTE — PLAN OF CARE
Problem: Adult Inpatient Plan of Care  Goal: Plan of Care Review  Description: The Plan of Care Review/Shift note should be completed every shift.  The Outcome Evaluation is a brief statement about your assessment that the patient is improving, declining, or no change.  This information will be displayed automatically on your shift  note.  Outcome: Progressing  Flowsheets (Taken 4/6/2024 1031)  Plan of Care Reviewed With:   patient   child   Goal Outcome Evaluation:to go to transitional care or home when ready  Problem: Adult Inpatient Plan of Care  Goal: Optimal Comfort and Wellbeing  Intervention: Provide Person-Centered Care  Recent Flowsheet Documentation  Taken 4/6/2024 0830 by Lesa Huff, RN  Trust Relationship/Rapport: care explained-patient able to walk around in room using cane and going to bathroom  Problem: Risk for Delirium  Goal: Improved Attention and Thought Clarity  Intervention: Maximize Cognitive Function  Recent Flowsheet Documentation  Taken 4/6/2024 0830 by Lesa Huff, RN  Reorientation Measures:   clock in view   reorientation provided-patient alert to self -   Problem: Comorbidity Management  Goal: Blood Pressure in Desired Range  Intervention: Maintain Blood Pressure Management  Recent Flowsheet Documentation  Taken 4/6/2024 0830 by Lesa Huff, RN  Medication Review/Management: medications reviewed-monitoring every 8 hours and medicate as per orders and mar    Plan of Care Reviewed With: patient, child-daughter in room and would like to take mother home. Nurse told to talk to family for 24/7 care.  Patient able to walk to bathroom with cane.  Alert to self.

## 2024-04-06 NOTE — PROGRESS NOTES
Patient much more calm this afternoon - not so paranoid.  Taking food and medications that are offered.  Refusing lab work.  Will continue to monitor

## 2024-04-07 PROCEDURE — 250N000013 HC RX MED GY IP 250 OP 250 PS 637: Performed by: INTERNAL MEDICINE

## 2024-04-07 PROCEDURE — 250N000013 HC RX MED GY IP 250 OP 250 PS 637: Performed by: HOSPITALIST

## 2024-04-07 PROCEDURE — 120N000001 HC R&B MED SURG/OB

## 2024-04-07 PROCEDURE — 250N000013 HC RX MED GY IP 250 OP 250 PS 637: Performed by: STUDENT IN AN ORGANIZED HEALTH CARE EDUCATION/TRAINING PROGRAM

## 2024-04-07 PROCEDURE — 250N000013 HC RX MED GY IP 250 OP 250 PS 637: Performed by: PSYCHIATRY & NEUROLOGY

## 2024-04-07 PROCEDURE — 99232 SBSQ HOSP IP/OBS MODERATE 35: CPT | Performed by: INTERNAL MEDICINE

## 2024-04-07 RX ORDER — QUETIAPINE FUMARATE 25 MG/1
25 TABLET, FILM COATED ORAL AT BEDTIME
Status: DISCONTINUED | OUTPATIENT
Start: 2024-04-07 | End: 2024-04-09

## 2024-04-07 RX ADMIN — ACETAMINOPHEN 1000 MG: 325 SOLUTION ORAL at 13:10

## 2024-04-07 RX ADMIN — LEVOTHYROXINE SODIUM 100 MCG: 100 TABLET ORAL at 10:57

## 2024-04-07 RX ADMIN — LISINOPRIL 5 MG: 5 TABLET ORAL at 10:57

## 2024-04-07 RX ADMIN — QUETIAPINE FUMARATE 25 MG: 25 TABLET ORAL at 20:30

## 2024-04-07 RX ADMIN — DICLOFENAC 2 G: 10 GEL TOPICAL at 23:59

## 2024-04-07 RX ADMIN — Medication 3 MG: at 19:10

## 2024-04-07 RX ADMIN — TRAZODONE HYDROCHLORIDE 25 MG: 50 TABLET ORAL at 20:29

## 2024-04-07 RX ADMIN — ACETAMINOPHEN 975 MG: 325 SOLUTION ORAL at 19:11

## 2024-04-07 RX ADMIN — ACETAMINOPHEN 1000 MG: 325 SOLUTION ORAL at 03:00

## 2024-04-07 ASSESSMENT — ACTIVITIES OF DAILY LIVING (ADL)
ADLS_ACUITY_SCORE: 44
ADLS_ACUITY_SCORE: 51
ADLS_ACUITY_SCORE: 46
ADLS_ACUITY_SCORE: 44
ADLS_ACUITY_SCORE: 54
ADLS_ACUITY_SCORE: 50
ADLS_ACUITY_SCORE: 51
ADLS_ACUITY_SCORE: 46
ADLS_ACUITY_SCORE: 51
ADLS_ACUITY_SCORE: 54
ADLS_ACUITY_SCORE: 51
ADLS_ACUITY_SCORE: 50
ADLS_ACUITY_SCORE: 46
ADLS_ACUITY_SCORE: 44
ADLS_ACUITY_SCORE: 50
ADLS_ACUITY_SCORE: 50
ADLS_ACUITY_SCORE: 51
ADLS_ACUITY_SCORE: 50
ADLS_ACUITY_SCORE: 51

## 2024-04-07 NOTE — PLAN OF CARE
Problem: Confusion Acute  Goal: Optimal Cognitive Function  Outcome: Not Progressing     Problem: Comorbidity Management  Goal: Maintenance of Behavioral Health Symptom Control  Outcome: Not Progressing     Goal Outcome Evaluation:       Pt presented to the hospital on 3/30 with flu like symptoms and UTI. Tested positive for covid on 3/27. Remains in isolation at this time. Has completed antibiotic course. No treatment for covid. She does have a dry cough. Lungs are clear. Denies shortness of breath. Continuous pulse ox >92% on RA. On Lovenox for DVT prophylaxis. Pt does have encephalopathy with suspected underlying cognitive dysfunction. Remains on 1:1 for safety. Moods are labile. She can be aggressive towards staff at times and pleasant at others. Makes multiple attempts to leave her room. A&O to self. Pt is very hard of hearing. Wears a hearing aid. SBA with ambulation using a cane. Denies pain. Palliative care and neurology consulting. She lives with her daughter. Tolerating a regular diet. Appetite is poor. Continent of bowel and bladder. She has chronic diarrhea.

## 2024-04-07 NOTE — PROGRESS NOTES
[facility]    Medicine Progress Note - Hospitalist Service    Date of Admission:  3/30/2024    Assessment & Plan   Mariela Ortiz is a 89 year old female admitted on 3/30/2024. She  presented to the ER with complaint of flulike symptoms for the past 3 weeks associated with cough, generalized weakness, poor appetite and oral intake, decreased mobility and diarrhea, confusion. Patient received ceftriaxone in the ED and was admitted for generalized weakness secondary to dehydration requiring fluid resuscitation secondary to poor oral intake, acute on chronic diarrhea to rule out acute infectious etiology, and possible UTI.    Summary: elderly with most likely dementia admitted for COVID and UTI. Worse mental status with not sleeping, restless and agitation on 1:1. Psych and neurology consults appreciated.  Now family is thinking about taking her back home but waiting for delirium to resolve    Generalized weakness due to covid and dehydration, UTI  Failure to thrive   COVID-19 infection   UTI  Acute on chronic diarrhea possibly in setting of viral illness  - flulike symptoms for the past 3 weeks associated with cough, generalized weakness, poor appetite and oral intake, decreased mobility and diarrhea.  Home COVID test +3 days PTA  -No treatment was recommended for COVID infection as illness onset was not clear and patient has not been hypoxic  -Remove special precautions today  -Completed antibiotic course for possible UTI (urine culture with >100K mixture of pathogens).  Unable to obtain repeat UA as urine has been mixed with stool    Chronic lymphocytic leukemia  -follows with hematology once a year and has not required any treatment in the past   -is undergoing surveillance by her oncologist    Suspected underlying cognitive impairment  Metabolic encephalopathy -start on Seroquel and Zyprexia prn  Sleep deprivation   Vitamin B12 is not low  Low suspicion for seizures  Continue supportive  cares    Mild hyperglycemia most likely reactive  Hemoglobin A1c 5.3  Monitor blood sugar levels intermittently    Physical deconditioning/weakness  Continue PT and OT   Family is thinking discharging home  Fall precautions    History of hypothyroidism  Continue with levothyroxine  Follow-up TSH    History of hypertension  Continue with lisinopril  Monitor vital signs as per protocol          Diet: Combination Diet Regular Diet Adult  Snacks/Supplements Adult: Ensure Enlive; With Meals    DVT Prophylaxis: Enoxaparin (Lovenox) SQ  Zaman Catheter: Not present  Lines: None     Cardiac Monitoring: None  Code Status: No CPR- Do NOT Intubate      Clinically Significant Risk Factors              # Hypoalbuminemia: Lowest albumin = 3.4 g/dL at 3/31/2024  5:17 AM, will monitor as appropriate     # Hypertension: Noted on problem list     # Dementia: noted on problem list               Disposition Plan      Expected Discharge Date: 04/08/2024    Discharge Delays: 1:1 Sitter still ordered - MD to assess  Placement - TCU    Discharge Comments: Patient needs to be off 1:1 to go to TCU. plan is home with family possible monday            Janee Stanford MD  Hospitalist Service  [facility]  Securely message with [email or web address] (more info)  Text page via [employer] Paging/Directory   ______________________________________________________________________    Interval History   Calm and cooperative   Reports having had some pain under her left knee earlier but not at this time  Denies dyspnea or cough    Physical Exam   Vital Signs: Temp: 97.8  F (36.6  C) Temp src: Oral BP: 138/61 Pulse: 80   Resp: 18 SpO2: 97 % O2 Device: None (Room air)    Weight: 120 lbs 9.6 oz    General Appearance: No acute distress, sitting in a recliner  Respiratory: Respirations unlabored, lungs are clear  Cardiovascular: Regular rate and rhythm.  Normal S1-S2.  Trace lower extremity edema  GI: Abdomen soft and nontender  Other: Awake and alert,  cooperative    Medical Decision Making       40 MINUTES SPENT BY ME on the date of service doing chart review, history, exam, documentation & further activities per the note.  MANAGEMENT DISCUSSED with the following over the past 24 hours: Patient and nursing staff       Data     I have personally reviewed the following data over the past 24 hrs:    N/A  \   N/A   / 197     N/A N/A N/A /  140 (H)   N/A N/A 0.67 \       Imaging results reviewed over the past 24 hrs:   No results found for this or any previous visit (from the past 24 hour(s)).

## 2024-04-07 NOTE — PLAN OF CARE
"  Problem: Adult Inpatient Plan of Care  Goal: Plan of Care Review  Description: The Plan of Care Review/Shift note should be completed every shift.  The Outcome Evaluation is a brief statement about your assessment that the patient is improving, declining, or no change.  This information will be displayed automatically on your shift  note.  Outcome: Not Progressing  Goal: Patient-Specific Goal (Individualized)  Description: You can add care plan individualizations to a care plan. Examples of Individualization might be:  \"Parent requests to be called daily at 9am for status\", \"I have a hard time hearing out of my right ear\", or \"Do not touch me to wake me up as it startles  me\".  Outcome: Not Progressing  Goal: Absence of Hospital-Acquired Illness or Injury  Outcome: Not Progressing  Goal: Optimal Comfort and Wellbeing  Outcome: Not Progressing  Goal: Readiness for Transition of Care  Outcome: Not Progressing     Problem: Risk for Delirium  Goal: Optimal Coping  Outcome: Not Progressing  Goal: Improved Behavioral Control  Outcome: Not Progressing  Intervention: Minimize Safety Risk  Recent Flowsheet Documentation  Taken 4/7/2024 0048 by Marni Urena, RN  Communication Enhancement Strategies:   extra time allowed for response   call light answered in person  Goal: Improved Attention and Thought Clarity  Outcome: Not Progressing  Goal: Improved Sleep  Outcome: Not Progressing     Problem: Comorbidity Management  Goal: Blood Pressure in Desired Range  Outcome: Not Progressing  Goal: Blood Glucose Levels Within Targeted Range  Outcome: Not Progressing   Goal Outcome Evaluation:       Pt was  very agitated and confused at the stat of the shift wanting to the leave for home, worries about her daughters not being aware of her being in the hospital, pt was redirected severe times, remains on 1:1 for safety, and staff offer PRN Seroquel which was effective. C/O pain to lower back, manage with PRN Tylenol and effective     "

## 2024-04-07 NOTE — PLAN OF CARE
Problem: Adult Inpatient Plan of Care  Goal: Plan of Care Review  Description: The Plan of Care Review/Shift note should be completed every shift.  The Outcome Evaluation is a brief statement about your assessment that the patient is improving, declining, or no change.  This information will be displayed automatically on your shift  note.  Outcome: Progressing  Flowsheets (Taken 4/7/2024 1123)  Plan of Care Reviewed With: patient   Goal Outcome Evaluation:plan to go home with family when ready  Problem: Risk for Delirium  Goal: Optimal Coping  Intervention: Optimize Psychosocial Adjustment to Delirium  Recent Flowsheet Documentation  Taken 4/7/2024 1000 by Lesa Huff, RN  Supportive Measures: verbalization of feelings encouraged-patient crabby - but not refusing cares  Problem: Risk for Delirium  Goal: Improved Sleep  Outcome: Progressing-patient sleeping in morning - slept through night  Problem: Infection  Goal: Absence of Infection Signs and Symptoms  Intervention: Prevent or Manage Infection  Recent Flowsheet Documentation  Taken 4/7/2024 1000 by Lesa Huff, RN  Isolation Precautions: special precautions discontinued-off isolation today   Problem: Comorbidity Management  Goal: Blood Pressure in Desired Range  Outcome: Progressing  Intervention: Maintain Blood Pressure Management  Recent Flowsheet Documentation  Taken 4/7/2024 1000 by Lesa Huff, RN  Medication Review/Management: medications reviewed-monitoring every 8 hours as needed    Plan of Care Reviewed With: patient awake and angry but able to do complete bath and gown change.  Took medications and up in chair.  Vitals within normal limits. On 1:1 for safety

## 2024-04-08 ENCOUNTER — APPOINTMENT (OUTPATIENT)
Dept: ULTRASOUND IMAGING | Facility: HOSPITAL | Age: 89
DRG: 884 | End: 2024-04-08
Attending: INTERNAL MEDICINE
Payer: COMMERCIAL

## 2024-04-08 ENCOUNTER — DOCUMENTATION ONLY (OUTPATIENT)
Dept: OTHER | Facility: CLINIC | Age: 89
End: 2024-04-08
Payer: COMMERCIAL

## 2024-04-08 PROCEDURE — 93971 EXTREMITY STUDY: CPT | Mod: LT

## 2024-04-08 PROCEDURE — 99418 PROLNG IP/OBS E/M EA 15 MIN: CPT | Performed by: NURSE PRACTITIONER

## 2024-04-08 PROCEDURE — 99233 SBSQ HOSP IP/OBS HIGH 50: CPT | Performed by: INTERNAL MEDICINE

## 2024-04-08 PROCEDURE — 99233 SBSQ HOSP IP/OBS HIGH 50: CPT | Performed by: NURSE PRACTITIONER

## 2024-04-08 PROCEDURE — 250N000013 HC RX MED GY IP 250 OP 250 PS 637: Performed by: HOSPITALIST

## 2024-04-08 PROCEDURE — 250N000013 HC RX MED GY IP 250 OP 250 PS 637: Performed by: INTERNAL MEDICINE

## 2024-04-08 PROCEDURE — 250N000013 HC RX MED GY IP 250 OP 250 PS 637: Performed by: PSYCHIATRY & NEUROLOGY

## 2024-04-08 PROCEDURE — 120N000001 HC R&B MED SURG/OB

## 2024-04-08 PROCEDURE — 250N000011 HC RX IP 250 OP 636: Performed by: INTERNAL MEDICINE

## 2024-04-08 PROCEDURE — 250N000013 HC RX MED GY IP 250 OP 250 PS 637: Performed by: STUDENT IN AN ORGANIZED HEALTH CARE EDUCATION/TRAINING PROGRAM

## 2024-04-08 RX ADMIN — LOPERAMIDE HYDROCHLORIDE 2 MG: 2 CAPSULE ORAL at 20:07

## 2024-04-08 RX ADMIN — QUETIAPINE FUMARATE 25 MG: 25 TABLET ORAL at 20:02

## 2024-04-08 RX ADMIN — ENOXAPARIN SODIUM 40 MG: 40 INJECTION SUBCUTANEOUS at 20:02

## 2024-04-08 RX ADMIN — ACETAMINOPHEN 975 MG: 325 SOLUTION ORAL at 00:03

## 2024-04-08 RX ADMIN — LISINOPRIL 5 MG: 5 TABLET ORAL at 07:19

## 2024-04-08 RX ADMIN — ACETAMINOPHEN 1000 MG: 325 SOLUTION ORAL at 16:58

## 2024-04-08 RX ADMIN — LOPERAMIDE HYDROCHLORIDE 2 MG: 2 CAPSULE ORAL at 08:14

## 2024-04-08 RX ADMIN — TRAZODONE HYDROCHLORIDE 50 MG: 50 TABLET ORAL at 20:02

## 2024-04-08 RX ADMIN — ACETAMINOPHEN 1000 MG: 325 SOLUTION ORAL at 07:16

## 2024-04-08 RX ADMIN — Medication 3 MG: at 18:54

## 2024-04-08 RX ADMIN — LEVOTHYROXINE SODIUM 100 MCG: 100 TABLET ORAL at 07:19

## 2024-04-08 RX ADMIN — QUETIAPINE FUMARATE 12.5 MG: 25 TABLET ORAL at 16:58

## 2024-04-08 RX ADMIN — QUETIAPINE FUMARATE 12.5 MG: 25 TABLET ORAL at 08:14

## 2024-04-08 RX ADMIN — CYANOCOBALAMIN TAB 1000 MCG 1000 MCG: 1000 TAB at 07:18

## 2024-04-08 ASSESSMENT — ACTIVITIES OF DAILY LIVING (ADL)
ADLS_ACUITY_SCORE: 50
ADLS_ACUITY_SCORE: 47
ADLS_ACUITY_SCORE: 47
ADLS_ACUITY_SCORE: 50
ADLS_ACUITY_SCORE: 47
ADLS_ACUITY_SCORE: 50
ADLS_ACUITY_SCORE: 47
ADLS_ACUITY_SCORE: 50
ADLS_ACUITY_SCORE: 47
ADLS_ACUITY_SCORE: 50
ADLS_ACUITY_SCORE: 47

## 2024-04-08 NOTE — PLAN OF CARE
Patient had periods of being alert and then more drowsy this shift.  Patient was up and down on night shift.  Was agitated at the beginning of the shift. Gave PRN tylenol for pain and seroquel for agitation.  Patient has slept on and off during the shift. Up to bathroom a few times.  Refusing meals.   Continued 1 to 1 for safety. Swearing at times. Saying she's cold, get out of here.  Will pass onto monitor. Olena Acosta RN     Problem: Adult Inpatient Plan of Care  Goal: Absence of Hospital-Acquired Illness or Injury  Outcome: Progressing  Intervention: Identify and Manage Fall Risk  Recent Flowsheet Documentation  Taken 4/8/2024 0800 by Olena Acosta RN  Safety Promotion/Fall Prevention:   bedside attendant   toileting scheduled  Intervention: Prevent Skin Injury  Recent Flowsheet Documentation  Taken 4/8/2024 0800 by Olena Acosta RN  Body Position: supine  Intervention: Prevent Infection  Recent Flowsheet Documentation  Taken 4/8/2024 0800 by Olena Acosta RN  Infection Prevention:   hand hygiene promoted   single patient room provided  Goal: Optimal Comfort and Wellbeing  Outcome: Progressing  Intervention: Monitor Pain and Promote Comfort  Recent Flowsheet Documentation  Taken 4/8/2024 0716 by Olena Acosta RN  Pain Management Interventions: medication (see MAR)  Intervention: Provide Person-Centered Care  Recent Flowsheet Documentation  Taken 4/8/2024 0800 by Olena Acosta RN  Trust Relationship/Rapport:   care explained   choices provided   thoughts/feelings acknowledged   questions answered   questions encouraged   reassurance provided     Problem: Adult Inpatient Plan of Care  Goal: Optimal Comfort and Wellbeing  Outcome: Progressing  Intervention: Monitor Pain and Promote Comfort  Recent Flowsheet Documentation  Taken 4/8/2024 0716 by Olena Acosta RN  Pain Management Interventions: medication (see MAR)  Intervention: Provide Person-Centered Care  Recent  Flowsheet Documentation  Taken 4/8/2024 0800 by Olena Acosta RN  Trust Relationship/Rapport:   care explained   choices provided   thoughts/feelings acknowledged   questions answered   questions encouraged   reassurance provided     Problem: Risk for Delirium  Goal: Optimal Coping  Outcome: Progressing  Intervention: Optimize Psychosocial Adjustment to Delirium  Recent Flowsheet Documentation  Taken 4/8/2024 0800 by Olena Acosta RN  Supportive Measures:   active listening utilized   verbalization of feelings encouraged  Goal: Improved Behavioral Control  Outcome: Progressing  Intervention: Prevent and Manage Agitation  Recent Flowsheet Documentation  Taken 4/8/2024 0800 by Olena Acosta RN  Complementary Therapy: (Lavender) --  Environment Familiarity/Consistency: daily routine followed  Intervention: Minimize Safety Risk  Recent Flowsheet Documentation  Taken 4/8/2024 0800 by Olena Acosta RN  Communication Enhancement Strategies:   device use encouraged   repetition utilized  Enhanced Safety Measures:  at bedside  Trust Relationship/Rapport:   care explained   choices provided   thoughts/feelings acknowledged   questions answered   questions encouraged   reassurance provided  Goal: Improved Attention and Thought Clarity  Outcome: Progressing  Intervention: Maximize Cognitive Function  Recent Flowsheet Documentation  Taken 4/8/2024 0800 by Olena Acosta RN  Sensory Stimulation Regulation: care clustered  Reorientation Measures:   clock in view   hearing device use encouraged   reorientation provided  Goal: Improved Sleep  Outcome: Progressing     Problem: Risk for Delirium  Goal: Improved Behavioral Control  Intervention: Minimize Safety Risk  Recent Flowsheet Documentation  Taken 4/8/2024 0800 by Olena Acosta RN  Communication Enhancement Strategies:   device use encouraged   repetition utilized  Enhanced Safety Measures:  at  bedside  Trust Relationship/Rapport:   care explained   choices provided   thoughts/feelings acknowledged   questions answered   questions encouraged   reassurance provided     Problem: Pain Acute  Goal: Optimal Pain Control and Function  Intervention: Develop Pain Management Plan  Recent Flowsheet Documentation  Taken 4/8/2024 0716 by Olena Acosta RN  Pain Management Interventions: medication (see MAR)  Intervention: Prevent or Manage Pain  Recent Flowsheet Documentation  Taken 4/8/2024 0800 by Olena Acosta RN  Sensory Stimulation Regulation: care clustered  Bowel Elimination Promotion:   adequate fluid intake promoted   ambulation promoted  Complementary Therapy: (Lavender) --  Medication Review/Management: medications reviewed  Intervention: Optimize Psychosocial Wellbeing  Recent Flowsheet Documentation  Taken 4/8/2024 0800 by Olena Acosta RN  Supportive Measures:   active listening utilized   verbalization of feelings encouraged   Goal Outcome Evaluation:

## 2024-04-08 NOTE — PROGRESS NOTES
Had massage and warm lanket from charge nurse with lavender oil appears to want to try sleeping, is in bed.

## 2024-04-08 NOTE — PROGRESS NOTES
Pt. Walked out of room, with cane, refused gait belt but  talked to charge nurse (a male) and was cooperative with him and walked back with soda and sat in chair.

## 2024-04-08 NOTE — PLAN OF CARE
Goal Outcome Evaluation:         Remains on 1:1 for safety. Pt is very confused. A&O to self only. Moods are labile. She can be aggressive towards staff at times and pleasant at others. Very hard of hearing and difficult to redirect. Allowed this writer to put in hearing aid.  SBA with ambulation using a cane. PRN tylenol given for c/o generalized aching.  Becoming increasingly agitated and unable to redirect.  PRN seroquel given which was effective.  Patient sat in the chair and ate supper and watched the news in a calm manner. Tolerating a regular diet. Continent of bowel and bladder. Pt has chronic diarrhea.  No loose stools this shift.  Ultrasound completed at bedside to rule out DVT.  Continue plan of care.

## 2024-04-08 NOTE — PROGRESS NOTES
PALLIATIVE CARE PROGRESS NOTE  [facility]     Patient: Mariela Ortiz  Date of Admission:  3/30/2024     Requesting Clinician / Team: Dr Tatum  Reason for consult: Goals of care         Recommendations & Counseling      GOALS OF CARE:   Friday, met with pts 2 dtrs. Goals are Restorative. Her 2 dtrs Stephanie and Lyla hope that Mariela can recover and return to her baseline level of function (a week ago, pt could've understood the palliative care discussion, would've been comfortable discussing serious matters, and would've had opinions she would want to share.  Continue current treatments for now. Dtrs would appreciate a daily clinical update call from MD if possible.   Dtrs aware that pt may need a discussion re: her residence and whether she needs either more services at her private home (a different dtr lives with her) or an alternative residential situation.   Per dtrs, she had wanted to come to the hospital and at this time, dtrs think that she would be open to future hospitalizations (although do confirm that pt does not really like it here and feels she is being kept here against her will). We did discuss the role of an informational hospice consult.  Daughters are aware that I could order it today to have that conversation sometime this weekend or they can think it over and let the hospitalist know if they wish for this this weekend.  They understand that they would meet with the care management staff member to talk about which hospice agency they would want to have an informational consult from.  They also understand that they do not need to do anything regarding this during this admission and they can ponder it further and if desired can talk with her primary care provider regarding this.  Today, follow up conversation with pts 2 dtrs and MD re: clinical updates, goals of care, safe discharge options. Dtrs wish pt was eligible for TCU (appreciate OT/PT re-evaluate re: TCU).  They are struggling with the decision/options for a safe discharge plan (pt lives with a different 3rd dtr). It is unknown whether that situation will offer pt enough support (seemed very possible it would not).   Did revisit the option for having an informational hospice at anytime, simply to gather information (but holding off for now). Dtrs ask for appropriate questions particularly wondering about a way pt can have more coordinated care (discussed that PCP clinics can be very short visits, hard to tackle all of the topics needing to be addressed; discussed some primary care clinics where they manage pts with complex health issues as well as  medicine).    Family are aware that we have an outpt palliative care clinic however pt has generally mild symptoms and may be best served seeing Neurology and Primary Care, unless pursued hospice eligibility evaluation.   Appreciate CM meeting with pts 2 dtrs Stephanie and Lyla today, to think through some safe discharge options.   At this time, inpatient palliative care will follow along peripherally as goals are clear (restorative).     ADVANCE CARE PLANNING:  Patient has an advance directive dated 1999.  Primary Health Care Agent Stephanie.  Alternate(s) Dtr Lyla.   There is no POLST form on file, recommend to complete prior to DC.  Code status: Changed to DNR/DNI last Friday, after indepth discussion with pts 2 dtrs/both of whom are her HCAs (code status changed to DNR/DNI or allow natural death). Her HCD does authorize them to make important decisions.      MEDICAL MANAGEMENT:   Chronic pain, bilaterally, takes Tylenol at home.  Reportedly does not like to take pills.  Has not her knee pain, she is not as active as her daughters would wish for her. LFTs normal. Family and RN report she has said she has felt achy.   -Agree with Tylenol. Could consider scheduling it eg 1000 mg BID to see if helps her delirium.     Altered memory dating back several years, daughters  have seen dementia on her notes in her chart but are not that familiar with this diagnosis.  They have not directly spoken with neurology who saw the patient during this admission.  -Appreciate Hospitalist talking with dtrs re: pts dementia inpt and potential outpt evaluation (best if done in the outpt setting if desired, when desired ideally has subsided).      Delirium, multiple likely etiologies (COVID infection, urinary tract infection, sleep deprivation, new environment,? alcohol abstinence/withdrawal).  Did spend time talking with daughters regarding what to expect related to delirium as well as the higher mortality at 1 year, important that they are aware of this.Family are not 100% exactly how much beer she consumes at home (might open 2-3 cans per day yet reportedly the cans are often not empty).   -Appreciate psychiatry and neurology evaluation and recommendations.     PSYCHOSOCIAL/SPIRITUAL SUPPORT:  Per dtrs, pt does not identify as spiritual.      Palliative Care will follow Mariela peripherally now (may sign off if MD agrees). Thank you for the consult and allowing us to aid in the care of Mariela Ortiz.     Case discussed with MD, RN, and CM.     Ponce Nugent NP  Securely message with Vocera (more info)  Text page via [employer]      60 minutes in a follow up in a non face to face time (w/2 dtrs and MD, did see pt face to face x 10 minutes).  An additional 30 minutes was spent in chart review, conversation with RN/CM, MD, and documentation. Total time:90 minutes.         Assessment       Mariela Ortiz is a 89 year old female with a past medical history of  HTN, BPPV, dementia, HLD  who presented on March 27th with flulike symptoms for the past 3 weeks associated with cough, generalized weakness, poor appetite and oral intake, decreased mobility and diarrhea, confusion. +COVID/UTI.        Today, the patient was seen for: Goals of care     Neurology consult last  week:  Impression  #Major neurocognitive disorder- suspected Alzheimers  #abnormal head CT  #delirium  #metabolic encephalopathy     Ms. Handley is a 89-year-old female who who is presenting for encephalopathy in the setting of UTI and COVID.  She is worsened in the past 2 days and appears that her circadian rhythm is disrupted.  She is not sleeping.  Her history is described by her daughter as well as her head CT is suggestive of a neurodegenerative dementia.  I suspect Alzheimer's disease based on the prominent hippocampal atrophy.  Certainly her prolonged hospitalization and infections put her at risk for delirium.  Ideally I attempted to order EEG, although patient does not cooperating at present.  I have a lower suspicion for ongoing seizures so we will continue to monitor.  Potentially when family is present we can attempt again, we will hold off will manage conservatively and trying to treat delirium.     Recommendations  -Continue treating metabolic causes  - Denying EEG at present, discussed with daughter and will hold off for now, but may need to consider if not improving or certainly if developing any seizure-like activity.  - Delirium precautions  - Scheduled melatonin in the evening ordered.    - vitamin b12 level  - Will see in coming days if worsening will consider EEG, otherwise will allow psych/medicine to treat delirium.       Palliative Care Summary:   Last week, met with dtrs together, Lyla (in person) and Stephanie (by telephone). Today, met with pts dtrs Stephanie and Lyla in person.      Prognosis, Goals, & Planning:    Functional Status just prior to this current hospitalization:  Ambulates, with a cane and walker     Prognosis, Goals, and/or Advance Care Planning:  Indepth discussion     Code Status was addressed today:   Discussed and changed code status Friday, after indepth discussion with pts dtrs re: pts values/wishes, outcomes. If pt improves cognitively, dtrs are open to talking with her  to make sure she is in agreement and are aware pt could change her mind, can change back to full code.      Patient's decision making preferences: with input from medical clinicians and loved ones        Patient has decision-making capacity today for complex decisions:Unreliable             Coping, Meaning, & Spirituality:   Mood, coping, and/or meaning in the context of serious illness were addressed today: Yes. 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      Social:    back in 1979. Worked 4 jobs and retired at age 59. 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 Is very musical. Is frustrated that she is not to do some of the construction/building that she enjoys. She lives in her own home, 1 level. With one dtr Judith.            Review of Systems:     Besides above, an additional ROS system was not done.           Physical Exam:   Temp:  [98.2  F (36.8  C)] 98.2  F (36.8  C)  Pulse:  [67-77] 77  Resp:  [18-19] 18  BP: (136-150)/(58-63) 150/63  SpO2:  [100 %] 100 %  120 lbs 9.6 oz    Physical Exam  Resting, but alert enough and able to speak, saying she was frustrated as she was cold (provided her with warm blankets). She was frustrated. Elected to leave alone, not irritate further.              Current Problem List:   Principal Problem:    Chronic diarrhea  Active Problems:    Anorexia    Confusion    General weakness    Confusion associated with infection    Acute cystitis without hematuria    COVID-19      Allergies   Allergen Reactions    Morphine Rash and Unknown     Red line went up her arm when given  Other reaction(s): Erythema  Red line went up her arm when given      Oxycodone-Acetaminophen Rash            Data Reviewed:     No results found for this or any previous visit (from the past 24 hour(s)).

## 2024-04-08 NOTE — PROGRESS NOTES
"Care Management Follow Up    Length of Stay (days): 9    Expected Discharge Date: 04/09/2024     Concerns to be Addressed: discharge planning     Patient plan of care discussed at interdisciplinary rounds: Yes    Anticipated Discharge Disposition:  TBD     Anticipated Discharge Services:  NA  Anticipated Discharge DME:  NA    Patient/family educated on Medicare website which has current facility and service quality ratings:  Yes  Education Provided on the Discharge Plan:  Yes  Patient/Family in Agreement with the Plan:  Yes    Referrals Placed by CM/SW:  Post Acute Facilities  Private pay costs discussed: Not applicable    Additional Information:  Discussed with MD.     Patient requiring TCU, however on a 1:1 for safety.     TCU referrals pending.    Social HX: Mariela was found to be COVID + on 3/27/24. Family had brought her to [facility] ER on 3/27/24, but family took her home because they didn't want to board in the ER. They took her home, but she was \"too much work at home with her worsening confusion, so brought her to [facility] ER.\"     She lives in a house with her daughter Judith. It is Mariela's house. Judith is \"not with her during the daytimes when she is working. Normally this is fine that she is alone sometimes, but over the past few days she has had increased confusion and increased agitation.\" \"She most often uses the cane. Also has a 4WW if needed\".     She is independent with most ADLs and gets help with all IADLs.     CM will continue to follow care progression and aide in discharge planning as needed.     1:59 PM - RNCM met with patient's daughters, discussed discharge planning options and answered multiple questions. Family's goal would be for patient to return home with homecare/ family support possible private homecare also to support. However was told today patient is not doing well so anticipating this wouldn't be able to happen until later in the week. Patient remains on a 1:1 and this is a " barrier to discharge to TCU, have not been able to wean 1:1 due to safety. Discussed memory care, family stated that if memory care was needed would be private pay. RNCM provided homecare private pay agency list for family to follow up on possible services if patient was to discharge home.     2:39 PM - Discussed with TCU/LTC liaison, possibly able to accept patient if able to produce HCA paperwork or POA paperwork. RNCM left voicemail for patient's daughter Catalina Wong's number states no longer in service. Call placed to Stephanie, answered, stated copy of HCA paperwork was provided and placed in patient's chart. RNCM reviewed paperchart, HCD paperwork found, stickered and sent to [employer] for immediate upload.     2:57 PM - Spoke with patient's daughters, contacts updated.     Shyanne Collins RN

## 2024-04-08 NOTE — PROGRESS NOTES
Around 2120 pt. Walked to bathroom and insisted she thought she was wet and then was not and refused to sit on toilet and was very angry with me. Unsure why and she stated no nover mind.  Pt. Continuously stating she would like alcohol from the other fridge.. settled for a soda.

## 2024-04-08 NOTE — PROGRESS NOTES
Pt. Drinking a small can soda. Moved around  room, attemeped ot get her to go to bed but she refused we have now moved the recliner int ot kendrick for her to watch staff.

## 2024-04-08 NOTE — PLAN OF CARE
Problem: Risk for Delirium  Goal: Improved Behavioral Control  Intervention: Prevent and Manage Agitation  Recent Flowsheet Documentation  Taken 4/8/2024 0003 by Ania Johnson RN  Complementary Therapy: (Lavender) aromatherapy utilized  Environment Familiarity/Consistency: daily routine followed  Intervention: Minimize Safety Risk  Recent Flowsheet Documentation  Taken 4/8/2024 0003 by Ania Johnson RN  Communication Enhancement Strategies:   device use encouraged   repetition utilized  Trust Relationship/Rapport:   care explained   choices provided   thoughts/feelings acknowledged   questions answered   questions encouraged   reassurance provided     Problem: Pain Acute  Goal: Optimal Pain Control and Function  Intervention: Prevent or Manage Pain  Recent Flowsheet Documentation  Taken 4/8/2024 0003 by Ania Johnson RN  Sensory Stimulation Regulation: care clustered  Bowel Elimination Promotion:   adequate fluid intake promoted   ambulation promoted  Complementary Therapy: (Lavender) aromatherapy utilized  Intervention: Optimize Psychosocial Wellbeing  Recent Flowsheet Documentation  Taken 4/8/2024 0003 by Ania Johnson RN  Supportive Measures:   active listening utilized   verbalization of feelings encouraged     Problem: Confusion Acute  Goal: Optimal Cognitive Function  Outcome: Progressing  Intervention: Minimize Contributing Factors  Recent Flowsheet Documentation  Taken 4/8/2024 0003 by Ania Johnson RN  Sensory Stimulation Regulation: care clustered  Reorientation Measures:   clock in view   hearing device use encouraged   reorientation provided  Communication Support Strategies:   active listening utilized   simple statements used  Environment Familiarity/Consistency: daily routine followed   Goal Outcome Evaluation:  Patient is alert and oriented to self. Disoriented to time, place, and situation. Patient was up in the chair around midnight following a brief stay in bed. She  complained of pain left hip area with facial grimace. Prn tylenol 975 mg given around midnight.  Lavender essential oil massage to the left hip. Patient was in and out bed from midnight, going from bed to the chair and back to bed. Patient was up to the bathroom x 6, but voided x 4 with 2 small and 1 large bowel movements. The longest sleep time without getting up was approximately 90 minutes long. Otherwise patient has been getting up every 10 to 30 minutes. Last up to the bathroom was at 0615. Patient is back in bed sleeping at this time after using the bathroom.

## 2024-04-08 NOTE — PLAN OF CARE
Problem: Confusion Acute  Goal: Optimal Cognitive Function  Outcome: Not Progressing     Problem: Risk for Delirium  Goal: Improved Sleep  Outcome: Progressing     Problem: Risk for Delirium  Goal: Improved Behavioral Control  Outcome: Progressing  Intervention: Minimize Safety Risk  Recent Flowsheet Documentation  Taken 4/7/2024 1715 by Selene Avelar RN  Enhanced Safety Measures:  at bedside  Trust Relationship/Rapport: care explained     Goal Outcome Evaluation:        Pt presented to the hospital on 3/30 with flu like symptoms and UTI. Tested positive for covid on 3/27. She is now covid recovered and off isolation. Has completed antibiotic course. Remains on 1:1 for safety. Pt is very confused. A&O to self. Moods are labile. She can be aggressive towards staff at times and pleasant at others. Very hard of hearing and difficult to redirect. SBA with ambulation using a cane. Received prn Tylenol at 1920 for complaints of joints aching. Tolerating a regular diet. Appetite is fair. Continent of bowel and bladder. Pt has chronic diarrhea. Lives with her daughter. Anticipate discharge to home tomorrow. Scheduled Seroquel initiated at .

## 2024-04-09 ENCOUNTER — APPOINTMENT (OUTPATIENT)
Dept: PHYSICAL THERAPY | Facility: HOSPITAL | Age: 89
DRG: 884 | End: 2024-04-09
Payer: COMMERCIAL

## 2024-04-09 LAB
CREAT SERPL-MCNC: 0.79 MG/DL (ref 0.51–0.95)
EGFRCR SERPLBLD CKD-EPI 2021: 71 ML/MIN/1.73M2
PLATELET # BLD AUTO: 176 10E3/UL (ref 150–450)

## 2024-04-09 PROCEDURE — 250N000013 HC RX MED GY IP 250 OP 250 PS 637: Performed by: STUDENT IN AN ORGANIZED HEALTH CARE EDUCATION/TRAINING PROGRAM

## 2024-04-09 PROCEDURE — 82565 ASSAY OF CREATININE: CPT | Performed by: INTERNAL MEDICINE

## 2024-04-09 PROCEDURE — 36415 COLL VENOUS BLD VENIPUNCTURE: CPT | Performed by: INTERNAL MEDICINE

## 2024-04-09 PROCEDURE — 120N000001 HC R&B MED SURG/OB

## 2024-04-09 PROCEDURE — 250N000013 HC RX MED GY IP 250 OP 250 PS 637: Performed by: INTERNAL MEDICINE

## 2024-04-09 PROCEDURE — 250N000013 HC RX MED GY IP 250 OP 250 PS 637: Performed by: PSYCHIATRY & NEUROLOGY

## 2024-04-09 PROCEDURE — 99232 SBSQ HOSP IP/OBS MODERATE 35: CPT | Performed by: HOSPITALIST

## 2024-04-09 PROCEDURE — 250N000013 HC RX MED GY IP 250 OP 250 PS 637: Performed by: HOSPITALIST

## 2024-04-09 PROCEDURE — 97110 THERAPEUTIC EXERCISES: CPT | Mod: GP

## 2024-04-09 PROCEDURE — 85049 AUTOMATED PLATELET COUNT: CPT | Performed by: INTERNAL MEDICINE

## 2024-04-09 RX ORDER — ACETAMINOPHEN 500 MG
1000 TABLET ORAL EVERY 6 HOURS PRN
Status: DISCONTINUED | OUTPATIENT
Start: 2024-04-09 | End: 2024-04-15 | Stop reason: HOSPADM

## 2024-04-09 RX ORDER — ACETAMINOPHEN 500 MG
1000 TABLET ORAL EVERY 6 HOURS PRN
Status: DISCONTINUED | OUTPATIENT
Start: 2024-04-09 | End: 2024-04-09

## 2024-04-09 RX ORDER — TRAZODONE HYDROCHLORIDE 50 MG/1
50 TABLET, FILM COATED ORAL AT BEDTIME
Status: DISCONTINUED | OUTPATIENT
Start: 2024-04-09 | End: 2024-04-15 | Stop reason: HOSPADM

## 2024-04-09 RX ORDER — ACETAMINOPHEN 325 MG/10.15ML
975 LIQUID ORAL EVERY 6 HOURS PRN
Status: DISCONTINUED | OUTPATIENT
Start: 2024-04-09 | End: 2024-04-15 | Stop reason: HOSPADM

## 2024-04-09 RX ORDER — QUETIAPINE FUMARATE 25 MG/1
25 TABLET, FILM COATED ORAL AT BEDTIME
Status: DISCONTINUED | OUTPATIENT
Start: 2024-04-09 | End: 2024-04-11

## 2024-04-09 RX ADMIN — LEVOTHYROXINE SODIUM 100 MCG: 100 TABLET ORAL at 07:57

## 2024-04-09 RX ADMIN — DICLOFENAC 2 G: 10 GEL TOPICAL at 08:04

## 2024-04-09 RX ADMIN — LOPERAMIDE HYDROCHLORIDE 2 MG: 2 CAPSULE ORAL at 10:00

## 2024-04-09 RX ADMIN — CYANOCOBALAMIN TAB 1000 MCG 1000 MCG: 1000 TAB at 07:57

## 2024-04-09 RX ADMIN — Medication 3 MG: at 18:09

## 2024-04-09 RX ADMIN — LISINOPRIL 5 MG: 5 TABLET ORAL at 07:57

## 2024-04-09 RX ADMIN — TRAZODONE HYDROCHLORIDE 50 MG: 50 TABLET ORAL at 20:01

## 2024-04-09 RX ADMIN — QUETIAPINE FUMARATE 12.5 MG: 25 TABLET ORAL at 07:57

## 2024-04-09 RX ADMIN — QUETIAPINE FUMARATE 25 MG: 25 TABLET ORAL at 18:09

## 2024-04-09 RX ADMIN — ACETAMINOPHEN 1000 MG: 325 SOLUTION ORAL at 13:59

## 2024-04-09 RX ADMIN — QUETIAPINE FUMARATE 12.5 MG: 25 TABLET ORAL at 12:49

## 2024-04-09 ASSESSMENT — ACTIVITIES OF DAILY LIVING (ADL)
ADLS_ACUITY_SCORE: 51
ADLS_ACUITY_SCORE: 55
ADLS_ACUITY_SCORE: 50
ADLS_ACUITY_SCORE: 47
ADLS_ACUITY_SCORE: 54
ADLS_ACUITY_SCORE: 51
ADLS_ACUITY_SCORE: 53
ADLS_ACUITY_SCORE: 50
ADLS_ACUITY_SCORE: 51
ADLS_ACUITY_SCORE: 47
ADLS_ACUITY_SCORE: 50
ADLS_ACUITY_SCORE: 51
ADLS_ACUITY_SCORE: 51
ADLS_ACUITY_SCORE: 47
ADLS_ACUITY_SCORE: 50
ADLS_ACUITY_SCORE: 47
ADLS_ACUITY_SCORE: 50
ADLS_ACUITY_SCORE: 51
ADLS_ACUITY_SCORE: 47
ADLS_ACUITY_SCORE: 50
ADLS_ACUITY_SCORE: 47
ADLS_ACUITY_SCORE: 51
ADLS_ACUITY_SCORE: 50

## 2024-04-09 NOTE — PROGRESS NOTES
Quick Palliative Care Note:  Met with pts family last week and yesterday. Goal was to get her back to her baseline, family wishing she could get approved for TCU, then can assess home needs over time (for safety). Family aware palliative care will be less involved at this stage as work on discharge planning. Connected with MD this AM. Inpatient palliative care will sign off. Appreciate CM working with dtrs on discharge options.  Ponce Nugent NP,CNP, Palliative Care

## 2024-04-09 NOTE — PLAN OF CARE
"Problem: Adult Inpatient Plan of Care  Goal: Absence of Hospital-Acquired Illness or Injury  Outcome: Progressing     Problem: Adult Inpatient Plan of Care  Goal: Optimal Comfort and Wellbeing  Outcome: Progressing     Problem: Adult Inpatient Plan of Care  Goal: Plan of Care Review  Description: The Plan of Care Review/Shift note should be completed every shift.  The Outcome Evaluation is a brief statement about your assessment that the patient is improving, declining, or no change.  This information will be displayed automatically on your shift  note.  Outcome: Progressing  Goal: Patient-Specific Goal (Individualized)  Description: You can add care plan individualizations to a care plan. Examples of Individualization might be:  \"Parent requests to be called daily at 9am for status\", \"I have a hard time hearing out of my right ear\", or \"Do not touch me to wake me up as it startles  me\".  Outcome: Progressing  Goal: Absence of Hospital-Acquired Illness or Injury  Outcome: Progressing  Goal: Optimal Comfort and Wellbeing  Outcome: Progressing  Goal: Readiness for Transition of Care  Outcome: Progressing     Problem: Risk for Delirium  Goal: Optimal Coping  Outcome: Progressing  Goal: Improved Behavioral Control  Outcome: Progressing  Goal: Improved Attention and Thought Clarity  Outcome: Progressing  Goal: Improved Sleep  Outcome: Progressing     Problem: Pain Acute  Goal: Optimal Pain Control and Function  Outcome: Progressing     Problem: Infection  Goal: Absence of Infection Signs and Symptoms  Outcome: Progressing     Problem: Comorbidity Management  Goal: Blood Pressure in Desired Range  Outcome: Progressing  Goal: Blood Glucose Levels Within Targeted Range  Outcome: Progressing     Problem: Gas Exchange Impaired  Goal: Optimal Gas Exchange  Outcome: Progressing     Problem: UTI (Urinary Tract Infection)  Goal: Improved Infection Symptoms  Outcome: Progressing     Problem: Confusion Acute  Goal: Optimal " Cognitive Function  Outcome: Progressing     Pt confused and oriented only to self. Denied pain thus far. PRN loperamide given for x2 soft-loose BM's; pt continued to have x3 loose stools since administration. Continue to monitor. Pt on 1:1 for pt safety. Pt took all medications this shift.

## 2024-04-09 NOTE — PROGRESS NOTES
[facility]    Medicine Progress Note - Hospitalist Service    Date of Admission:  3/30/2024    Assessment & Plan   Mariela Ortiz is a 89 year old female admitted on 3/30/2024. She  presented to the ER with complaint of flulike symptoms for the past 3 weeks associated with cough, generalized weakness, poor appetite and oral intake, diarrhea, decreased mobility and confusion.  She was found to have possible UTI (completed antibiotic course) and positive COVID PCR however did not receive any treatment since she has not been hypoxic.      Her mental status has been fluctuating, suspect delirium in the setting of undiagnosed cognitive impairment/dementia.  Still requires one-to-one.  Ongoing discussions with family regarding discharge plan (TCU versus home)     Acute encephalopathy in the setting of likely undiagnosed dementia.  CT head shows moderate generalized volume loss with severe presumed chronic small vessel ischemic changes.  Considered COVID recovered.  Completed treatment for UTI.  Mental status continues to fluctuate, suspect delirium.  Still needs one-to-one for safety.  Trazodone, Seroquel and melatonin at bedtime.  As needed Seroquel and olanzapine for agitation  Possible UTI.  Urine culture with >100K mixture of pathogens.  Completed antibiotic course.  Family requested repeat UA however unable to obtain.   Recovered COVID.  Did not have any specific treatment as she was not hypoxic and onset of illness was probably a few weeks ago  Left leg swelling.  Ultrasound with no evidence of DVT.  Diarrhea.  Unable to obtain stool sample.  Could use Imodium as needed  CLL.  Stable  Hypothyroidism on levothyroxine  Essential hypertension.  Continue lisinopril  Goals of care.  DNR/DNI.  Goals are restorative.  Met with daughters Ginger and Lyla today, they were given a lot of information regarding discharge options            Diet: Combination Diet Regular Diet Adult  Snacks/Supplements  Adult: Ensure Enlive; With Meals    DVT Prophylaxis: Enoxaparin (Lovenox) SQ  Zaman Catheter: Not present  Lines: None     Cardiac Monitoring: None  Code Status: No CPR- Do NOT Intubate      Clinically Significant Risk Factors              # Hypoalbuminemia: Lowest albumin = 3.4 g/dL at 3/31/2024  5:17 AM, will monitor as appropriate     # Hypertension: Noted on problem list     # Dementia: noted on problem list               Disposition Plan      Expected Discharge Date: 04/11/2024    Discharge Delays: 1:1 Sitter still ordered - MD to assess  Placement - TCU    Discharge Comments: Patient needs to be off 1:1 to go to TCU.            Janee Stanford MD  Hospitalist Service  [facility]  Securely message with [employer] (more info)  Text page via [employer] Paging/Directory   ______________________________________________________________________    Interval History   Met with daughters Stephanie and Catalina and Palliative care.  Went over CT scan results, probable diagnosis of dementia and recommended outpatient more in depth evaluation if desired by family.  We discussed different discharge options and family concerns   Patient appears very confused   Her left lower leg and foot is swollen, she has been complaining of generalized pain    Physical Exam   Vital Signs: Temp: 98.3  F (36.8  C) Temp src: Oral BP: 105/43 Pulse: 77   Resp: 18 SpO2: 96 % O2 Device: None (Room air)    Weight: 120 lbs 9.6 oz    General Appearance: No acute distress, confused  Respiratory: Respirations unlabored  Other: Extremities: 1+ left lower extremity edema, both feet are warm although I cannot palpate DP pulses    Medical Decision Making       60 MINUTES SPENT BY ME on the date of service doing chart review, history, exam, documentation & further activities per the note.  MANAGEMENT DISCUSSED with the following over the past 24 hours: Patient, nursing staff, also meeting with 2 patient's daughters and palliative care        Data         Imaging results reviewed over the past 24 hrs:   Recent Results (from the past 24 hour(s))   US Lower Extremity Venous Duplex Left    Narrative    EXAM: US LOWER EXTREMITY VENOUS DUPLEX LEFT  LOCATION: [facility]  DATE: 4/8/2024    INDICATION: Left leg swelling, rule out DVT  COMPARISON: None.  TECHNIQUE: Venous Duplex ultrasound of the left lower extremity with and without compression, augmentation and duplex. Color flow and spectral Doppler with waveform analysis performed.    FINDINGS: Exam includes the common femoral, femoral, popliteal, and contralateral common femoral veins as well as segmentally visualized deep calf veins and greater saphenous vein.     LEFT: No deep vein thrombosis. No superficial thrombophlebitis. No popliteal cyst.      Impression    IMPRESSION:  1.  No deep venous thrombosis in the left lower extremity.

## 2024-04-09 NOTE — PROGRESS NOTES
"CLINICAL NUTRITION SERVICES - REASSESSMENT NOTE     Nutrition Prescription    RECOMMENDATIONS FOR MDs/PROVIDERS TO ORDER:    Malnutrition Status:    Patient does not meet two of the criteria necessary for diagnosing malnutrition    Recommendations already ordered by Registered Dietitian (RD):  Continue ONS Ensure BID    Future/Additional Recommendations:  Monitor po intake, sup iraida., wt, labs, BM.      EVALUATION OF THE PROGRESS TOWARD GOALS   Diet: Regular  Nutrition Supplement: Ensure enlive BID  Intake: Okay    Pt ordering 1-3 large meals/day, consuming 50-75% most meals- estimated avg 1400 kcal, >40 g protein/day   Pt refusing meals occasionally  Drinking x1-2 Ensure enlive nutrition supplements/day       NEW FINDINGS   Met with pt at bedside this AM. Pt with confusion. Pt likes Ensure enlive, drinking [place] this AM. Pt states she is bored of eating.   FA     ANTHROPOMETRICS  Height: 157.5 cm (5' 2\")  Most Recent Weight: 54.7 kg (120 lb 9.6 oz)    IBW: 50 kg  BMI: Normal BMI  Weight History: No recent weight loss   Wt Readings from Last 3 Encounters:   04/01/24 54.7 kg (120 lb 9.6 oz)   01/09/20 60.8 kg (134 lb)   06/15/17 68 kg (150 lb)   3/27/24            54 kg (119 lb)   8/31/23            52.9 kg (116 lb 11.2 oz)   7/20/23            53.5 kg (117 lb 14.1 oz)   10/4/22            58 kg (127 lb 14.4 oz)     Dosing Weight: 54.7 kg     ASSESSED NUTRITION NEEDS  Estimated Energy Needs: 3472-0504 kcals/day (25 - 30+ kcals/kg)  Justification: Maintenance  Estimated Protein Needs: 55-66 grams protein/day (1 - 1.2 grams of pro/kg)  Justification: Increased needs  Estimated Fluid Needs: 1368 mL/day (25 mL/kg)   Justification: Maintenance    PHYSICAL FINDINGS/GI CONCERNS  See malnutrition section below.  Per [employer]se[employer]:  GI: Diarrhea- chronic   BM: x7 this AM (soft/loose), x7 4/8, has Imodium PRN  Edema: Mild BLE    LABS  Reviewed    MEDICATIONS  Reviewed  Scheduled Vit B12, lovenox, levothyroxine, seroquel, " desyrel     MALNUTRITION  % Weight Loss:  None noted  % Intake:  No decreased intake noted, progressing oral intakes   Subcutaneous Fat Loss:  None observed  Muscle Loss:  Clavicle bone region mild depletion, Acromion bone region moderate depletion, Scapular bone region moderate depletion, and Dorsal hand region mild depletion  Fluid Retention:  Mild BLE    Malnutrition Diagnosis: Patient does not meet two of the above criteria necessary for diagnosing malnutrition    Previous Goals   Electrolytes WNL - Met previously, no new lyte labs since 3/31     CURRENT NUTRITION DIAGNOSIS  Inadequate oral intake related to acute illness as evidenced by poor po x 3 weeks, decreased interest in eating.   Evaluation: Progressing    INTERVENTIONS  Implementation  ONS- Continue ensure enlive BID     Goals  Pt to meet >75% nutritional needs - Progressing    Monitoring/Evaluation  Progress toward goals will be monitored and evaluated per protocol.

## 2024-04-09 NOTE — PLAN OF CARE
Problem: Adult Inpatient Plan of Care  Goal: Plan of Care Review  Outcome: Progressing   Goal Outcome Evaluation:       Patient woke up irritable requesting to see her daughters and disoriented to time and place. Impulsive, trying to get out of bed. Ambulated patient to the bathroom and prn Seroquel administered. Patient now calm, cooperative, laughing with staff and [place]. Ate her toast and cookie for breakfast, able to feed self after tray set-up. Will inform staff when needing to use the restroom. Imodium administered for loose stool x2 this morning. Ambulated patient 150ft using rolling walker/transfer belt and assist of (1). Complains of bilateral hip and neck discomfort, Voltaren cream applied. Patient has been appreciative with cares today. 1:1 removed and will monitor closely.  Marcelina Nelson RN

## 2024-04-09 NOTE — PROGRESS NOTES
[facility]    Medicine Progress Note - Hospitalist Service    Date of Admission:  3/30/2024    Assessment & Plan   Mariela Ortiz is a 89 year old female admitted on 3/30/2024. She  presented to the ER with complaint of flulike symptoms for the past 3 weeks associated with cough, generalized weakness, poor appetite and oral intake, diarrhea, decreased mobility and confusion.  She was found to have possible UTI (completed antibiotic course) and positive COVID PCR however did not receive any treatment since she has not been hypoxic.      Her mental status has been fluctuating, suspect delirium in the setting of undiagnosed cognitive impairment/dementia.  Still requires one-to-one.  Ongoing discussions with family regarding discharge plan (TCU versus home)     Acute encephalopathy in the setting of likely undiagnosed dementia.  CT head shows moderate generalized volume loss with severe presumed chronic small vessel ischemic changes.  Considered COVID recovered.  Completed treatment for UTI.  Mental status continues to fluctuate, suspect delirium.  Still needs one-to-one for safety.  Trazodone, Seroquel and melatonin at bedtime (changed time to earlier in evening).  As needed Seroquel and olanzapine for agitation  Possible UTI.  Urine culture with >100K mixture of pathogens.  Completed antibiotic course.  Family requested repeat UA however unable to obtain.   Recovered COVID.  Did not have any specific treatment as she was not hypoxic and onset of illness was probably a few weeks ago  Left leg swelling.  Ultrasound with no evidence of DVT.  Diarrhea.  Unable to obtain stool sample.  Could use Imodium as needed  CLL.  Stable  Hypothyroidism on levothyroxine  Essential hypertension.  Continue lisinopril  Goals of care.  DNR/DNI.  Goals are restorative.  Dr. Lyle discussed in detail with daughters on 4/8          Diet: Combination Diet Regular Diet Adult  Snacks/Supplements Adult: Ensure  Enlive; With Meals    DVT Prophylaxis: Enoxaparin (Lovenox) SQ  Zaman Catheter: Not present  Lines: None     Cardiac Monitoring: None  Code Status: No CPR- Do NOT Intubate      Clinically Significant Risk Factors              # Hypoalbuminemia: Lowest albumin = 3.4 g/dL at 3/31/2024  5:17 AM, will monitor as appropriate     # Hypertension: Noted on problem list     # Dementia: noted on problem list               Disposition Plan      Expected Discharge Date: 04/11/2024    Discharge Delays: Placement - TCU    Discharge Comments: Patient needs to be off 1:1 to go to TCU.            Ottoniel Shaffer MD  Hospitalist Service  [facility]  Securely message with [employer] (more info)  Text page via [employer] Paging/Directory   ______________________________________________________________________    Interval History     No new physical complaints.  Reports she is bored      Physical Exam   Vital Signs: Temp: 98  F (36.7  C) Temp src: Oral BP: 108/54 Pulse: 73   Resp: 18 SpO2: 99 % O2 Device: None (Room air)    Weight: 120 lbs 9.6 oz    General Appearance: No acute distress, confused  Respiratory: Respirations unlabored    Medical Decision Making         Data         Imaging results reviewed over the past 24 hrs:   No results found for this or any previous visit (from the past 24 hour(s)).

## 2024-04-09 NOTE — PLAN OF CARE
Problem: Risk for Delirium  Goal: Improved Sleep  Outcome: Progressing   Goal Outcome Evaluation:  Pt remains confused.  Impulsive and lacks insight into weakness.  Pt slept between trips to the bathroom.  Requiring 1:1 sitter for safety.  No s/s pain.

## 2024-04-10 LAB
ALBUMIN UR-MCNC: NEGATIVE MG/DL
APPEARANCE UR: CLEAR
BACTERIA #/AREA URNS HPF: ABNORMAL /HPF
BILIRUB UR QL STRIP: NEGATIVE
COLOR UR AUTO: ABNORMAL
GLUCOSE UR STRIP-MCNC: NEGATIVE MG/DL
HGB UR QL STRIP: NEGATIVE
KETONES UR STRIP-MCNC: NEGATIVE MG/DL
LEUKOCYTE ESTERASE UR QL STRIP: ABNORMAL
NITRATE UR QL: NEGATIVE
PH UR STRIP: 6 [PH] (ref 5–7)
RBC URINE: 0 /HPF
SP GR UR STRIP: 1.01 (ref 1–1.03)
SQUAMOUS EPITHELIAL: 5 /HPF
UROBILINOGEN UR STRIP-MCNC: <2 MG/DL
WBC CLUMPS #/AREA URNS HPF: PRESENT /HPF
WBC URINE: 16 /HPF

## 2024-04-10 PROCEDURE — 81001 URINALYSIS AUTO W/SCOPE: CPT | Performed by: STUDENT IN AN ORGANIZED HEALTH CARE EDUCATION/TRAINING PROGRAM

## 2024-04-10 PROCEDURE — 250N000013 HC RX MED GY IP 250 OP 250 PS 637: Performed by: HOSPITALIST

## 2024-04-10 PROCEDURE — 99232 SBSQ HOSP IP/OBS MODERATE 35: CPT | Performed by: STUDENT IN AN ORGANIZED HEALTH CARE EDUCATION/TRAINING PROGRAM

## 2024-04-10 PROCEDURE — 250N000013 HC RX MED GY IP 250 OP 250 PS 637: Performed by: INTERNAL MEDICINE

## 2024-04-10 PROCEDURE — 250N000011 HC RX IP 250 OP 636: Performed by: REGISTERED NURSE

## 2024-04-10 PROCEDURE — 250N000013 HC RX MED GY IP 250 OP 250 PS 637: Performed by: STUDENT IN AN ORGANIZED HEALTH CARE EDUCATION/TRAINING PROGRAM

## 2024-04-10 PROCEDURE — 120N000001 HC R&B MED SURG/OB

## 2024-04-10 PROCEDURE — 250N000013 HC RX MED GY IP 250 OP 250 PS 637: Performed by: PSYCHIATRY & NEUROLOGY

## 2024-04-10 PROCEDURE — 87086 URINE CULTURE/COLONY COUNT: CPT | Performed by: STUDENT IN AN ORGANIZED HEALTH CARE EDUCATION/TRAINING PROGRAM

## 2024-04-10 RX ADMIN — Medication 3 MG: at 18:05

## 2024-04-10 RX ADMIN — DICLOFENAC 2 G: 10 GEL TOPICAL at 08:30

## 2024-04-10 RX ADMIN — TRAZODONE HYDROCHLORIDE 50 MG: 50 TABLET ORAL at 18:05

## 2024-04-10 RX ADMIN — LISINOPRIL 5 MG: 5 TABLET ORAL at 09:27

## 2024-04-10 RX ADMIN — LEVOTHYROXINE SODIUM 100 MCG: 100 TABLET ORAL at 09:27

## 2024-04-10 RX ADMIN — DICLOFENAC 2 G: 10 GEL TOPICAL at 18:41

## 2024-04-10 RX ADMIN — QUETIAPINE FUMARATE 25 MG: 25 TABLET ORAL at 18:05

## 2024-04-10 RX ADMIN — QUETIAPINE FUMARATE 12.5 MG: 25 TABLET ORAL at 09:28

## 2024-04-10 RX ADMIN — OLANZAPINE 2.5 MG: 10 INJECTION, POWDER, FOR SOLUTION INTRAMUSCULAR at 17:18

## 2024-04-10 RX ADMIN — ACETAMINOPHEN 1000 MG: 500 TABLET ORAL at 18:44

## 2024-04-10 RX ADMIN — CYANOCOBALAMIN TAB 1000 MCG 1000 MCG: 1000 TAB at 09:27

## 2024-04-10 RX ADMIN — QUETIAPINE FUMARATE 12.5 MG: 25 TABLET ORAL at 14:56

## 2024-04-10 RX ADMIN — Medication 3 MG: at 20:45

## 2024-04-10 ASSESSMENT — ACTIVITIES OF DAILY LIVING (ADL)
ADLS_ACUITY_SCORE: 47
ADLS_ACUITY_SCORE: 48
ADLS_ACUITY_SCORE: 51
ADLS_ACUITY_SCORE: 47
ADLS_ACUITY_SCORE: 47
ADLS_ACUITY_SCORE: 48
ADLS_ACUITY_SCORE: 48
ADLS_ACUITY_SCORE: 47
ADLS_ACUITY_SCORE: 51
ADLS_ACUITY_SCORE: 48
ADLS_ACUITY_SCORE: 51
ADLS_ACUITY_SCORE: 48
ADLS_ACUITY_SCORE: 51
ADLS_ACUITY_SCORE: 48
ADLS_ACUITY_SCORE: 51
ADLS_ACUITY_SCORE: 48
ADLS_ACUITY_SCORE: 48
ADLS_ACUITY_SCORE: 47
ADLS_ACUITY_SCORE: 48
ADLS_ACUITY_SCORE: 48
ADLS_ACUITY_SCORE: 47

## 2024-04-10 NOTE — PLAN OF CARE
Problem: Adult Inpatient Plan of Care  Goal: Absence of Hospital-Acquired Illness or Injury  Outcome: Progressing  Intervention: Identify and Manage Fall Risk  Recent Flowsheet Documentation  Taken 4/9/2024 1810 by Saw Marie RN  Safety Promotion/Fall Prevention:   activity supervised   assistive device/personal items within reach   clutter free environment maintained   lighting adjusted   nonskid shoes/slippers when out of bed   patient and family education   room door open   room near nurse's station   safety round/check completed   supervised activity   bedside attendant     Problem: Adult Inpatient Plan of Care  Goal: Optimal Comfort and Wellbeing  Outcome: Progressing     Problem: Risk for Delirium  Goal: Optimal Coping  Outcome: Progressing  Goal: Improved Behavioral Control  Outcome: Progressing  Intervention: Minimize Safety Risk  Recent Flowsheet Documentation  Taken 4/9/2024 1810 by Saw Marie RN  Enhanced Safety Measures:  at bedside  Goal: Improved Attention and Thought Clarity  Outcome: Progressing  Goal: Improved Sleep  Outcome: Progressing     Problem: Pain Acute  Goal: Optimal Pain Control and Function  Outcome: Progressing     Problem: Comorbidity Management  Goal: Blood Glucose Levels Within Targeted Range  Outcome: Progressing  Goal: Maintenance of Asthma Control  Outcome: Progressing  Goal: Maintenance of Behavioral Health Symptom Control  Outcome: Progressing  Goal: Maintenance of COPD Symptom Control  Outcome: Progressing  Goal: Maintenance of Heart Failure Symptom Control  Outcome: Progressing  Goal: Blood Pressure in Desired Range  Outcome: Progressing  Goal: Maintenance of Osteoarthritis Symptom Control  Outcome: Progressing  Goal: Bariatric Home Regimen Maintained  Outcome: Progressing  Goal: Maintenance of Seizure Control  Outcome: Progressing     Problem: Confusion Acute  Goal: Optimal Cognitive Function  Outcome: Progressing    Pt alert/confused and oriented to  self. Denied pain thus far. Pt ambulated the hallway this shift w/ stand by assist. Pt has been pleasant and cooperative. Pt did agree to take medications and have lab drawn after education and re-approach.

## 2024-04-10 NOTE — PROVIDER NOTIFICATION
Dr Chand updated-Pt up to bathroom multiple times and not urinating. Documented urinated 5 times last night. 1:1 removed at 0930. Bed alarm on and call light within reach.

## 2024-04-10 NOTE — CONSULTS
SPIRITUAL HEALTH SERVICES Progress Note  [facility], P4    Saw pt Mariela Ortiz per consult order/length of stay.    Patient/Family Understanding of Illness and Goals of Care - Mariela was pleasant and did not articulate her reason for being in the hospital, but also showed signs of confusion/disorientation to place during visit. She was unable to tell me where she lived.    Distress and Loss - Ongoing hospitalization.     Strengths, Coping, and Resources - She shared some family/life history. She is originally from [place]. She has two sisters, one of whom is . She shared about her Chinese heritage.     Meaning, Beliefs, and Spirituality - Patient comes from [place] background and derives meaning, purpose, and comfort from jorge. She states that she does not have current Church connection. Patient welcomed prayer and expressed appreciation for the visit. She welcomes visits.       Plan of Care - A  will continue to visit as able or per request by patient/family/staff.      Albino Weldon MDiv, [facility]  /Manager [facility]  [phone]       Spiritual Health Services is available  for emergent requests and consults, either by paging the on-call  or by entering an ASAP/STAT consult in [employer]DA, which will also page the on-call .

## 2024-04-10 NOTE — PLAN OF CARE
Problem: Adult Inpatient Plan of Care  Goal: Plan of Care Review  Description: The Plan of Care Review/Shift note should be completed every shift.  The Outcome Evaluation is a brief statement about your assessment that the patient is improving, declining, or no change.  This information will be displayed automatically on your shift  note.  Outcome: Progressing    Pt alert and confused,denied having any pain,cooperative with cares,demanding at times,getting up to Bathroom several times during this shift ,oriented to self only ,1:1 observation continued for pt safety.

## 2024-04-10 NOTE — PROGRESS NOTES
"Care Management Follow Up    Length of Stay (days): 11    Expected Discharge Date: 04/11/2024     Concerns to be Addressed: discharge planning     Patient plan of care discussed at interdisciplinary rounds: Yes    Anticipated Discharge Disposition:  TBD     Anticipated Discharge Services:  NA  Anticipated Discharge DME:  NA    Patient/family educated on Medicare website which has current facility and service quality ratings:  Yes  Education Provided on the Discharge Plan:  Yes  Patient/Family in Agreement with the Plan:  Yes    Referrals Placed by CM/SW:  Post Acute Facilities  Private pay costs discussed: Not applicable    Additional Information:  Discussed with MD.     Discussed with TCU liaison, facility is reviewing notes, will update CM as able.     Social HX: Mariela was found to be COVID + on 3/27/24. Family had brought her to [facility] ER on 3/27/24, but family took her home because they didn't want to board in the ER. They took her home, but she was \"too much work at home with her worsening confusion, so brought her to [facility] ER.\"     She lives in a house with her daughter Judith. It is Mariela's house. Judith is \"not with her during the daytimes when she is working. Normally this is fine that she is alone sometimes, but over the past few days she has had increased confusion and increased agitation.\" \"She most often uses the cane. Also has a 4WW if needed\".     She is independent with most ADLs and gets help with all IADLs.     CM will continue to follow care progression and aide in discharge planning as needed.     Shyanne Collins RN      "

## 2024-04-10 NOTE — PROGRESS NOTES
[facility]    Medicine Progress Note - Hospitalist Service    Date of Admission:  3/30/2024    Assessment & Plan   Mariela Ortiz is a 89 year old female admitted on 3/30/2024. She  presented to the ER with complaint of flulike symptoms for the past 3 weeks associated with cough, generalized weakness, poor appetite and oral intake, diarrhea, decreased mobility and confusion.    -- She was found to have possible UTI (completed antibiotic course) and positive COVID PCR however did not receive any treatment since she has not been hypoxic.      -- Her mental status has been fluctuating, suspect delirium in the setting of undiagnosed cognitive impairment/dementia.    -- 04/10: Off of one-to-one today.  Ongoing discussions with family regarding discharge plan (TCU versus home)     Acute encephalopathy in the setting of likely undiagnosed dementia.    -- CT head shows moderate generalized volume loss with severe presumed chronic small vessel ischemic changes  -- Considered COVID recovered.  Completed treatment for UTI.  Mental status continues to fluctuate, suspect delirium.    -- Still needs one-to-one for safety.  Trazodone, Seroquel and melatonin at bedtime (changed time to earlier in evening).    -- As needed Seroquel and olanzapine for agitation    Possible UTI  -- Urine culture with >100K mixture of pathogens.  Completed antibiotic course.    -- Family requested repeat UA however unable to obtain.     Recovered COVID  -- Did not have any specific treatment as she was not hypoxic and onset of illness was probably a few weeks ago    Left leg swelling  -- Ultrasound with no evidence of DVT.    Diarrhea  -- Unable to obtain stool sample.  Could use Imodium as needed    CLL  -- Stable    Hypothyroidism   -- Continue PTA levothyroxine    Essential hypertension  --  Continue PTA lisinopril    Goals of care:  DNR/DNI.  Goals are restorative.  Dr. Lyle discussed in detail with daughters on  4/8          Diet: Combination Diet Regular Diet Adult  Snacks/Supplements Adult: Ensure Enlive; With Meals    DVT Prophylaxis: Enoxaparin (Lovenox) SQ  Zaman Catheter: Not present  Lines: None     Cardiac Monitoring: None  Code Status: No CPR- Do NOT Intubate      Clinically Significant Risk Factors              # Hypoalbuminemia: Lowest albumin = 3.4 g/dL at 3/31/2024  5:17 AM, will monitor as appropriate     # Hypertension: Noted on problem list     # Dementia: noted on problem list               Disposition Plan     Medically Ready for Discharge: Anticipated in 2-4 Days             Jess Chand MD  Hospitalist Service  [facility]  Securely message with [employer] (more info)  Text page via [employer] Paging/Directory   ______________________________________________________________________    Interval History   Patient is new to me today. Chart reviewed.  Patient is seen and examined at the bedside.  Pt is off of one to one since this morning.      Physical Exam   Vital Signs: Temp: 97.7  F (36.5  C) Temp src: Oral BP: 104/48 Pulse: 71   Resp: 18 SpO2: 99 % O2 Device: None (Room air)    Weight: 120 lbs 9.6 oz    GEN: Alert. Not in acute distress.  HEENT: Atraumatic, mucous membrane- moist and pink.  Chest: Bilateral air entry.  CVS: S1S2 regular.   Abdomen: Soft. Non-tender, non-distended. No organomegaly. No guarding or rigidity. Bowel sounds active.   Extremities: No pedal edema.  CNS: No involuntary movements.  Skin: no cyanosis or clubbing.     Medical Decision Making       45 MINUTES SPENT BY ME on the date of service doing chart review, history, exam, documentation & further activities per the note.      Data

## 2024-04-10 NOTE — PLAN OF CARE
Goal Outcome Evaluation:      Plan of Care Reviewed With: patient, child    Overall Patient Progress: improvingOverall Patient Progress: improving         Problem: Adult Inpatient Plan of Care  Goal: Absence of Hospital-Acquired Illness or Injury  Intervention: Identify and Manage Fall Risk  Recent Flowsheet Documentation  Taken 4/10/2024 0742 by Jessica Sim, RN  Safety Promotion/Fall Prevention:   activity supervised   bedside attendant   nonskid shoes/slippers when out of bed   room near nurse's station   supervised activity     Problem: Adult Inpatient Plan of Care  Goal: Optimal Comfort and Wellbeing  Outcome: Progressing     Problem: Adult Inpatient Plan of Care  Goal: Plan of Care Review  Description: The Plan of Care Review/Shift note should be completed every shift.  The Outcome Evaluation is a brief statement about your assessment that the patient is improving, declining, or no change.  This information will be displayed automatically on your shift  note.  Outcome: Progressing  Flowsheets (Taken 4/10/2024 1232)  Plan of Care Reviewed With:   patient   child  Overall Patient Progress: improving     Pt irritable this morning but cooperative. Daughters state that's pretty typical. Up freq to bathroom in morning but improved as the shift went on. Great appetite. Reassured easily. Another daughter visiting this afternoon and wheeling pt around in a wheelchair. Pt loves to talk. Off 1:1 since 0930.

## 2024-04-11 PROCEDURE — 250N000013 HC RX MED GY IP 250 OP 250 PS 637: Performed by: PSYCHIATRY & NEUROLOGY

## 2024-04-11 PROCEDURE — 99233 SBSQ HOSP IP/OBS HIGH 50: CPT | Performed by: STUDENT IN AN ORGANIZED HEALTH CARE EDUCATION/TRAINING PROGRAM

## 2024-04-11 PROCEDURE — 250N000013 HC RX MED GY IP 250 OP 250 PS 637: Performed by: HOSPITALIST

## 2024-04-11 PROCEDURE — 250N000013 HC RX MED GY IP 250 OP 250 PS 637: Performed by: STUDENT IN AN ORGANIZED HEALTH CARE EDUCATION/TRAINING PROGRAM

## 2024-04-11 PROCEDURE — 120N000001 HC R&B MED SURG/OB

## 2024-04-11 RX ADMIN — LISINOPRIL 5 MG: 5 TABLET ORAL at 08:50

## 2024-04-11 RX ADMIN — LEVOTHYROXINE SODIUM 100 MCG: 100 TABLET ORAL at 08:50

## 2024-04-11 RX ADMIN — QUETIAPINE 12.5 MG: 25 TABLET, FILM COATED ORAL at 21:34

## 2024-04-11 RX ADMIN — TRAZODONE HYDROCHLORIDE 50 MG: 50 TABLET ORAL at 19:17

## 2024-04-11 RX ADMIN — CYANOCOBALAMIN TAB 1000 MCG 1000 MCG: 1000 TAB at 08:50

## 2024-04-11 RX ADMIN — Medication 3 MG: at 19:16

## 2024-04-11 RX ADMIN — QUETIAPINE 37.5 MG: 25 TABLET, FILM COATED ORAL at 17:07

## 2024-04-11 RX ADMIN — ACETAMINOPHEN 1000 MG: 500 TABLET ORAL at 10:29

## 2024-04-11 ASSESSMENT — ACTIVITIES OF DAILY LIVING (ADL)
ADLS_ACUITY_SCORE: 48
ADLS_ACUITY_SCORE: 51
ADLS_ACUITY_SCORE: 47
ADLS_ACUITY_SCORE: 48
ADLS_ACUITY_SCORE: 51
ADLS_ACUITY_SCORE: 48
ADLS_ACUITY_SCORE: 51
ADLS_ACUITY_SCORE: 48
ADLS_ACUITY_SCORE: 48
ADLS_ACUITY_SCORE: 47
ADLS_ACUITY_SCORE: 47
ADLS_ACUITY_SCORE: 51
ADLS_ACUITY_SCORE: 48
ADLS_ACUITY_SCORE: 47
ADLS_ACUITY_SCORE: 47
ADLS_ACUITY_SCORE: 48
ADLS_ACUITY_SCORE: 48
ADLS_ACUITY_SCORE: 47
ADLS_ACUITY_SCORE: 47
ADLS_ACUITY_SCORE: 51
ADLS_ACUITY_SCORE: 48

## 2024-04-11 NOTE — PLAN OF CARE
Problem: Adult Inpatient Plan of Care  Goal: Absence of Hospital-Acquired Illness or Injury  Intervention: Identify and Manage Fall Risk  Recent Flowsheet Documentation  Taken 4/10/2024 1700 by Dottie Klein RN  Safety Promotion/Fall Prevention:   activity supervised   assistive device/personal items within reach   bedside attendant   increased rounding and observation   increase visualization of patient   patient and family education   room door open   room near nurse's station   safety round/check completed  Goal: Optimal Comfort and Wellbeing  Intervention: Monitor Pain and Promote Comfort  Recent Flowsheet Documentation  Taken 4/10/2024 1930 by Dottie Klein RN  Pain Management Interventions:   medication offered but refused   distraction   rest   relaxation techniques promoted  Taken 4/10/2024 1830 by Dottie Klein RN  Pain Management Interventions: medication (see MAR)  Intervention: Provide Person-Centered Care  Recent Flowsheet Documentation  Taken 4/10/2024 1700 by Dottie Klein RN  Trust Relationship/Rapport:   care explained   emotional support provided     Problem: Risk for Delirium  Goal: Optimal Coping  Intervention: Optimize Psychosocial Adjustment to Delirium  Recent Flowsheet Documentation  Taken 4/10/2024 1700 by Dottie Klein RN  Supportive Measures:   active listening utilized   goal-setting facilitated   self-care encouraged   relaxation techniques promoted   positive reinforcement provided  Goal: Improved Behavioral Control  Intervention: Minimize Safety Risk  Recent Flowsheet Documentation  Taken 4/10/2024 1700 by Dottie Klein RN  Enhanced Safety Measures:   assistive devices when indicated   room near unit station    at bedside   review medications for side effects with activity  Trust Relationship/Rapport:   care explained   emotional support provided  Goal: Improved Attention and Thought Clarity  Intervention: Maximize Cognitive Function  Recent  Flowsheet Documentation  Taken 4/10/2024 1700 by Dottie Klein RN  Reorientation Measures:   calendar in view   clock in view   familiar social contact encouraged   reorientation provided     Problem: Pain Acute  Goal: Optimal Pain Control and Function  Intervention: Develop Pain Management Plan  Recent Flowsheet Documentation  Taken 4/10/2024 1930 by Dottie Klein RN  Pain Management Interventions:   medication offered but refused   distraction   rest   relaxation techniques promoted  Taken 4/10/2024 1830 by Dottie Klein RN  Pain Management Interventions: medication (see MAR)  Intervention: Prevent or Manage Pain  Recent Flowsheet Documentation  Taken 4/10/2024 1700 by Dottie Klein RN  Medication Review/Management: medications reviewed  Intervention: Optimize Psychosocial Wellbeing  Recent Flowsheet Documentation  Taken 4/10/2024 1700 by Dottie Klein RN  Supportive Measures:   active listening utilized   goal-setting facilitated   self-care encouraged   relaxation techniques promoted   positive reinforcement provided     Problem: Comorbidity Management  Goal: Blood Pressure in Desired Range  Intervention: Maintain Blood Pressure Management  Recent Flowsheet Documentation  Taken 4/10/2024 1700 by Dotite Klein RN  Medication Review/Management: medications reviewed  Goal: Blood Glucose Levels Within Targeted Range  Intervention: Monitor and Manage Glycemia  Recent Flowsheet Documentation  Taken 4/10/2024 1700 by Dottie Klein RN  Medication Review/Management: medications reviewed     Problem: Confusion Acute  Goal: Optimal Cognitive Function  Intervention: Minimize Contributing Factors  Recent Flowsheet Documentation  Taken 4/10/2024 1700 by Dottie Klein RN  Reorientation Measures:   calendar in view   clock in view   familiar social contact encouraged   reorientation provided     Problem: Adult Inpatient Plan of Care  Goal: Absence of Hospital-Acquired Illness or Injury  Intervention:  Identify and Manage Fall Risk  Recent Flowsheet Documentation  Taken 4/10/2024 1700 by Dottie Klein RN  Safety Promotion/Fall Prevention:   activity supervised   assistive device/personal items within reach   bedside attendant   increased rounding and observation   increase visualization of patient   patient and family education   room door open   room near nurse's station   safety round/check completed  Goal: Optimal Comfort and Wellbeing  Intervention: Monitor Pain and Promote Comfort  Recent Flowsheet Documentation  Taken 4/10/2024 1930 by Dottie Klein RN  Pain Management Interventions:   medication offered but refused   distraction   rest   relaxation techniques promoted  Taken 4/10/2024 1830 by Dottie Klein RN  Pain Management Interventions: medication (see MAR)  Intervention: Provide Person-Centered Care  Recent Flowsheet Documentation  Taken 4/10/2024 1700 by Dottie Klein RN  Trust Relationship/Rapport:   care explained   emotional support provided     Problem: Comorbidity Management  Goal: Blood Glucose Levels Within Targeted Range  Intervention: Monitor and Manage Glycemia  Recent Flowsheet Documentation  Taken 4/10/2024 1700 by Dottie Klein RN  Medication Review/Management: medications reviewed  Goal: Maintenance of Asthma Control  Intervention: Maintain Asthma Symptom Control  Recent Flowsheet Documentation  Taken 4/10/2024 1700 by Dottie Klein RN  Medication Review/Management: medications reviewed  Goal: Maintenance of Behavioral Health Symptom Control  Intervention: Maintain Behavioral Health Symptom Control  Recent Flowsheet Documentation  Taken 4/10/2024 1700 by Dottie Klein RN  Medication Review/Management: medications reviewed  Goal: Maintenance of COPD Symptom Control  Intervention: Maintain COPD Symptom Control  Recent Flowsheet Documentation  Taken 4/10/2024 1700 by Dottie Klein RN  Supportive Measures:   active listening utilized   goal-setting facilitated    self-care encouraged   relaxation techniques promoted   positive reinforcement provided  Medication Review/Management: medications reviewed  Goal: Maintenance of Heart Failure Symptom Control  Intervention: Maintain Heart Failure Management  Recent Flowsheet Documentation  Taken 4/10/2024 1700 by Dottie Klein RN  Medication Review/Management: medications reviewed  Goal: Blood Pressure in Desired Range  Intervention: Maintain Blood Pressure Management  Recent Flowsheet Documentation  Taken 4/10/2024 1700 by Dottie Klein RN  Medication Review/Management: medications reviewed  Goal: Maintenance of Osteoarthritis Symptom Control  Intervention: Maintain Osteoarthritis Symptom Control  Recent Flowsheet Documentation  Taken 4/10/2024 1700 by Dottie Klein RN  Activity Management:   ambulated outside room   ambulated in room  Medication Review/Management: medications reviewed  Taken 4/10/2024 1541 by Dottie Klein RN  Assistive Device Utilized:   cane   gait belt  Activity Management: ambulated in room  Goal: Bariatric Home Regimen Maintained  Intervention: Maintain and Manage Postbariatric Surgery Care  Recent Flowsheet Documentation  Taken 4/10/2024 1700 by Dottie Klein RN  Medication Review/Management: medications reviewed  Goal: Maintenance of Seizure Control  Intervention: Maintain Seizure Symptom Control  Recent Flowsheet Documentation  Taken 4/10/2024 1700 by Dottie Klein RN  Medication Review/Management: medications reviewed     Problem: Pain Acute  Goal: Optimal Pain Control and Function  Intervention: Develop Pain Management Plan  Recent Flowsheet Documentation  Taken 4/10/2024 1930 by Dottie Klein RN  Pain Management Interventions:   medication offered but refused   distraction   rest   relaxation techniques promoted  Taken 4/10/2024 1830 by Dottie Klein RN  Pain Management Interventions: medication (see MAR)  Intervention: Prevent or Manage Pain  Recent Flowsheet  Documentation  Taken 4/10/2024 1700 by Dottie Klein RN  Medication Review/Management: medications reviewed  Intervention: Optimize Psychosocial Wellbeing  Recent Flowsheet Documentation  Taken 4/10/2024 1700 by Dottie Klein RN  Supportive Measures:   active listening utilized   goal-setting facilitated   self-care encouraged   relaxation techniques promoted   positive reinforcement provided   Goal Outcome Evaluation:         Pt confused, agitated and restless most of the shift, wanting to go home. Pt with bedside attendant. Provided meds,comfort measures and distraction. Pt then later became calmer and cooperative. Standby assist with cane. Pain stable and controlled with prn meds. UA obtained this shift, cultures pending. Poor appetite for dinner, encouraged po intake.

## 2024-04-11 NOTE — PLAN OF CARE
Problem: Adult Inpatient Plan of Care  Goal: Plan of Care Review  Description: The Plan of Care Review/Shift note should be completed every shift.  The Outcome Evaluation is a brief statement about your assessment that the patient is improving, declining, or no change.  This information will be displayed automatically on your shift  note.  Outcome: Progressing     Problem: Adult Inpatient Plan of Care  Goal: Optimal Comfort and Wellbeing  Intervention: Monitor Pain and Promote Comfort  Recent Flowsheet Documentation  Taken 4/11/2024 1029 by Reilly Lilly RN  Pain Management Interventions: medication (see MAR)  Taken 4/11/2024 0850 by Reilly Lilly RN  Pain Management Interventions: medication offered but refused     Problem: Risk for Delirium  Goal: Improved Behavioral Control  Intervention: Prevent and Manage Agitation  Recent Flowsheet Documentation  Taken 4/11/2024 0850 by Reilly Lilly RN  Environment Familiarity/Consistency:   daily routine followed   familiar objects from home provided     Patient disorientated to place, time and situation. Restless this AM, was able to sleep a couple of hours. Medication compliant. Poor appetite continues. Up with the assist of 1 and walker. 1:1 was discontinued at 1200. All alarms are on and functioning. Tylenol administered for back pain with desired effect. Awaiting placement, pending ability to remain off of the 1:1.     Reilly Lilly RN

## 2024-04-11 NOTE — PROGRESS NOTES
1:1 discontinued at this time. Patient resting in bed calm and cooperative. Bed alarm on. Will monitor closely.  Marcelina Nelson RN

## 2024-04-11 NOTE — PROGRESS NOTES
[facility]    Medicine Progress Note - Hospitalist Service    Date of Admission:  3/30/2024    Assessment & Plan   Mariela Ortiz is a 89 year old female admitted on 3/30/2024. She  presented to the ER with complaint of flulike symptoms for the past 3 weeks associated with cough, generalized weakness, poor appetite and oral intake, diarrhea, decreased mobility and confusion.    -- She was found to have possible UTI (completed antibiotic course) and positive COVID PCR however did not receive any treatment since she has not been hypoxic.      -- Her mental status has been fluctuating, suspect delirium in the setting of undiagnosed cognitive impairment/dementia.    -- 04/11: Back to one-to-one today.  Ongoing discussions with family regarding discharge plan (TCU versus home)     Acute encephalopathy in the setting of likely undiagnosed dementia.    -- CT head shows moderate generalized volume loss with severe presumed chronic small vessel ischemic changes  -- Considered COVID recovered.  Completed treatment for UTI.  Mental status continues to fluctuate, suspect delirium.    -- Still needs one-to-one for safety.  Trazodone and melatonin at bedtime   -- 04/11: Scheduled seroquel in early evening increased to 37.5 mg  -- As needed Seroquel and olanzapine for agitation    Possible UTI  -- Urine culture with >100K mixture of pathogens.  Completed antibiotic course.    -- Family requested repeat UA however unable to obtain.     Recovered COVID  -- Did not have any specific treatment as she was not hypoxic and onset of illness was probably a few weeks ago    Left leg swelling  -- Ultrasound with no evidence of DVT.    Diarrhea  -- Unable to obtain stool sample.  Could use Imodium as needed    CLL  -- Stable    Hypothyroidism   -- Continue PTA levothyroxine    Essential hypertension  --  Continue PTA lisinopril    Goals of care:  DNR/DNI.  Goals are restorative.  Dr. Lyle discussed in detail  with daughters on 4/8. 04/11: Discussed with daughter Catalina at bedside          Diet: Combination Diet Regular Diet Adult  Snacks/Supplements Adult: Ensure Enlive; With Meals    DVT Prophylaxis: Enoxaparin (Lovenox) SQ  Zaman Catheter: Not present  Lines: None     Cardiac Monitoring: None  Code Status: No CPR- Do NOT Intubate      Clinically Significant Risk Factors              # Hypoalbuminemia: Lowest albumin = 3.4 g/dL at 3/31/2024  5:17 AM, will monitor as appropriate     # Hypertension: Noted on problem list     # Dementia: noted on problem list               Disposition Plan     Medically Ready for Discharge: Anticipated in 2-4 Days             Jess Chand MD  Hospitalist Service  [facility]  Securely message with [employer] (more info)  Text page via [employer] Paging/Directory   ______________________________________________________________________    Interval History   Patient is new to me today. Chart reviewed.  Patient is seen and examined at the bedside.  Pt is off of one to one since this morning.      Physical Exam   Vital Signs: Temp: 97.6  F (36.4  C) Temp src: Oral BP: 126/58 Pulse: 69   Resp: 16 SpO2: 98 % O2 Device: None (Room air)    Weight: 120 lbs 9.6 oz    GEN: Alert. Not in acute distress.  HEENT: Atraumatic, mucous membrane- moist and pink.  Chest: Bilateral air entry.  CVS: S1S2 regular.   Abdomen: Soft. Non-tender, non-distended. No organomegaly. No guarding or rigidity. Bowel sounds active.   Extremities: No pedal edema.  CNS: No involuntary movements.  Skin: no cyanosis or clubbing.     Medical Decision Making       52 MINUTES SPENT BY ME on the date of service doing chart review, history, exam, documentation & further activities per the note.      Data

## 2024-04-11 NOTE — PROGRESS NOTES
"Care Management Follow Up    Length of Stay (days): 12    Expected Discharge Date: 04/12/2024     Concerns to be Addressed: discharge planning     Patient plan of care discussed at interdisciplinary rounds: Yes    Anticipated Discharge Disposition:  TBD     Anticipated Discharge Services:  NA  Anticipated Discharge DME:  NA    Patient/family educated on Medicare website which has current facility and service quality ratings:  Yes  Education Provided on the Discharge Plan:  Yes  Patient/Family in Agreement with the Plan:  Yes    Referrals Placed by CM/SW:  Post Acute Facilities  Private pay costs discussed: Not applicable    Additional Information:  Discussed with MD.     Discussed with daughter, Catalina, referrals submitted for Memory care, private pay.     Social HX: Mariela was found to be COVID + on 3/27/24. Family had brought her to [facility] ER on 3/27/24, but family took her home because they didn't want to board in the ER. They took her home, but she was \"too much work at home with her worsening confusion, so brought her to [facility] ER.\"     She lives in a house with her daughter Judith. It is Mariela's house. Judith is \"not with her during the daytimes when she is working. Normally this is fine that she is alone sometimes, but over the past few days she has had increased confusion and increased agitation.\" \"She most often uses the cane. Also has a 4WW if needed\".     She is independent with most ADLs and gets help with all IADLs.     CM will continue to follow care progression and aide in discharge planning as needed.     Shyanne Collins RN      "

## 2024-04-11 NOTE — PLAN OF CARE
Problem: Adult Inpatient Plan of Care  Goal: Absence of Hospital-Acquired Illness or Injury  Intervention: Identify and Manage Fall Risk  Recent Flowsheet Documentation  Taken 4/11/2024 0445 by Lazara Bermudez RN  Safety Promotion/Fall Prevention:   activity supervised   assistive device/personal items within reach   bedside attendant   increased rounding and observation   increase visualization of patient   patient and family education   room door open   room near nurse's station   safety round/check completed  Taken 4/11/2024 0017 by Lazara Bermudez RN  Safety Promotion/Fall Prevention:   activity supervised   assistive device/personal items within reach   bedside attendant   increased rounding and observation   increase visualization of patient   patient and family education   room door open   room near nurse's station   safety round/check completed  Goal: Optimal Comfort and Wellbeing  Intervention: Provide Person-Centered Care  Recent Flowsheet Documentation  Taken 4/11/2024 0445 by Lazara Bermudez RN  Trust Relationship/Rapport:   care explained   emotional support provided  Taken 4/11/2024 0017 by Lazara Bermudez RN  Trust Relationship/Rapport:   care explained   emotional support provided     Problem: Risk for Delirium  Goal: Optimal Coping  Intervention: Optimize Psychosocial Adjustment to Delirium  Recent Flowsheet Documentation  Taken 4/11/2024 0445 by Lazara Bermudez RN  Supportive Measures:   active listening utilized   goal-setting facilitated   self-care encouraged   relaxation techniques promoted   positive reinforcement provided  Taken 4/11/2024 0017 by Lazara Bermudez RN  Supportive Measures:   active listening utilized   goal-setting facilitated   self-care encouraged   relaxation techniques promoted   positive reinforcement provided  Goal: Improved Behavioral Control  Intervention: Minimize Safety Risk  Recent Flowsheet Documentation  Taken 4/11/2024 0445 by Lazara Bermudez  RN  Enhanced Safety Measures:   assistive devices when indicated   room near unit station    at bedside   review medications for side effects with activity  Trust Relationship/Rapport:   care explained   emotional support provided  Taken 4/11/2024 0017 by Lazara Bermudez RN  Enhanced Safety Measures:   assistive devices when indicated   room near unit station    at bedside   review medications for side effects with activity  Trust Relationship/Rapport:   care explained   emotional support provided  Goal: Improved Attention and Thought Clarity  Intervention: Maximize Cognitive Function  Recent Flowsheet Documentation  Taken 4/11/2024 0445 by Lazara Bermudez RN  Reorientation Measures:   calendar in view   clock in view   familiar social contact encouraged   reorientation provided  Taken 4/11/2024 0017 by Lazara Bermudez RN  Reorientation Measures:   calendar in view   clock in view   familiar social contact encouraged   reorientation provided     Problem: Pain Acute  Goal: Optimal Pain Control and Function  Intervention: Prevent or Manage Pain  Recent Flowsheet Documentation  Taken 4/11/2024 0445 by Lazara Bermudez RN  Medication Review/Management: medications reviewed  Taken 4/11/2024 0017 by Lazara Bermudez RN  Medication Review/Management: medications reviewed  Intervention: Optimize Psychosocial Wellbeing  Recent Flowsheet Documentation  Taken 4/11/2024 0445 by Lazara Bermudez RN  Supportive Measures:   active listening utilized   goal-setting facilitated   self-care encouraged   relaxation techniques promoted   positive reinforcement provided  Taken 4/11/2024 0017 by Lazara Bermudez RN  Supportive Measures:   active listening utilized   goal-setting facilitated   self-care encouraged   relaxation techniques promoted   positive reinforcement provided     Problem: Comorbidity Management  Goal: Blood Pressure in Desired Range  Intervention: Maintain Blood  Pressure Management  Recent Flowsheet Documentation  Taken 4/11/2024 0445 by Lazara Bermudez RN  Medication Review/Management: medications reviewed  Taken 4/11/2024 0017 by Lazara Bermudez RN  Medication Review/Management: medications reviewed  Goal: Blood Glucose Levels Within Targeted Range  Intervention: Monitor and Manage Glycemia  Recent Flowsheet Documentation  Taken 4/11/2024 0445 by Lazara Bermudez RN  Medication Review/Management: medications reviewed  Taken 4/11/2024 0017 by Lazara Bermudez RN  Medication Review/Management: medications reviewed     Problem: Confusion Acute  Goal: Optimal Cognitive Function  Intervention: Minimize Contributing Factors  Recent Flowsheet Documentation  Taken 4/11/2024 0445 by Lazara Bermudez RN  Reorientation Measures:   calendar in view   clock in view   familiar social contact encouraged   reorientation provided  Taken 4/11/2024 0017 by Lazara Bermudez RN  Reorientation Measures:   calendar in view   clock in view   familiar social contact encouraged   reorientation provided     Problem: Adult Inpatient Plan of Care  Goal: Absence of Hospital-Acquired Illness or Injury  Intervention: Identify and Manage Fall Risk  Recent Flowsheet Documentation  Taken 4/11/2024 0445 by Lazara Bermudez RN  Safety Promotion/Fall Prevention:   activity supervised   assistive device/personal items within reach   bedside attendant   increased rounding and observation   increase visualization of patient   patient and family education   room door open   room near nurse's station   safety round/check completed  Taken 4/11/2024 0017 by Lazara Bermudez RN  Safety Promotion/Fall Prevention:   activity supervised   assistive device/personal items within reach   bedside attendant   increased rounding and observation   increase visualization of patient   patient and family education   room door open   room near nurse's station   safety round/check completed  Goal: Optimal Comfort  and Wellbeing  Intervention: Provide Person-Centered Care  Recent Flowsheet Documentation  Taken 4/11/2024 0445 by Lazara Bermudez RN  Trust Relationship/Rapport:   care explained   emotional support provided  Taken 4/11/2024 0017 by Lazara Bermudez RN  Trust Relationship/Rapport:   care explained   emotional support provided     Problem: Comorbidity Management  Goal: Blood Glucose Levels Within Targeted Range  Intervention: Monitor and Manage Glycemia  Recent Flowsheet Documentation  Taken 4/11/2024 0445 by Lazara Bermudez RN  Medication Review/Management: medications reviewed  Taken 4/11/2024 0017 by Lazara Bermudez RN  Medication Review/Management: medications reviewed  Goal: Maintenance of Asthma Control  Intervention: Maintain Asthma Symptom Control  Recent Flowsheet Documentation  Taken 4/11/2024 0445 by Lazara Bermudez RN  Medication Review/Management: medications reviewed  Taken 4/11/2024 0017 by Lazara Bermudez RN  Medication Review/Management: medications reviewed  Goal: Maintenance of Behavioral Health Symptom Control  Intervention: Maintain Behavioral Health Symptom Control  Recent Flowsheet Documentation  Taken 4/11/2024 0445 by Lazara Bermudez RN  Medication Review/Management: medications reviewed  Taken 4/11/2024 0017 by Lazara Bermudez RN  Medication Review/Management: medications reviewed  Goal: Maintenance of COPD Symptom Control  Intervention: Maintain COPD Symptom Control  Recent Flowsheet Documentation  Taken 4/11/2024 0445 by Lazara Bermudez RN  Supportive Measures:   active listening utilized   goal-setting facilitated   self-care encouraged   relaxation techniques promoted   positive reinforcement provided  Medication Review/Management: medications reviewed  Taken 4/11/2024 0017 by Lazara Bermudez RN  Supportive Measures:   active listening utilized   goal-setting facilitated   self-care encouraged   relaxation techniques promoted   positive reinforcement  provided  Medication Review/Management: medications reviewed  Goal: Maintenance of Heart Failure Symptom Control  Intervention: Maintain Heart Failure Management  Recent Flowsheet Documentation  Taken 4/11/2024 0445 by Lazara Bermudez RN  Medication Review/Management: medications reviewed  Taken 4/11/2024 0017 by Lazara Bermudez RN  Medication Review/Management: medications reviewed  Goal: Blood Pressure in Desired Range  Intervention: Maintain Blood Pressure Management  Recent Flowsheet Documentation  Taken 4/11/2024 0445 by Lazara Bermudez RN  Medication Review/Management: medications reviewed  Taken 4/11/2024 0017 by Lazara Bermudez RN  Medication Review/Management: medications reviewed  Goal: Maintenance of Osteoarthritis Symptom Control  Intervention: Maintain Osteoarthritis Symptom Control  Recent Flowsheet Documentation  Taken 4/11/2024 0445 by Lazara Bermudez RN  Activity Management:   ambulated outside room   ambulated in room  Medication Review/Management: medications reviewed  Taken 4/11/2024 0017 by Lazara Bermudez RN  Activity Management:   ambulated outside room   ambulated in room  Medication Review/Management: medications reviewed  Goal: Bariatric Home Regimen Maintained  Intervention: Maintain and Manage Postbariatric Surgery Care  Recent Flowsheet Documentation  Taken 4/11/2024 0445 by Lazara Bermudez RN  Medication Review/Management: medications reviewed  Taken 4/11/2024 0017 by Lazara Bermudez RN  Medication Review/Management: medications reviewed  Goal: Maintenance of Seizure Control  Intervention: Maintain Seizure Symptom Control  Recent Flowsheet Documentation  Taken 4/11/2024 0445 by Lazara Bermudez RN  Medication Review/Management: medications reviewed  Taken 4/11/2024 0017 by Lazara Bermudez RN  Medication Review/Management: medications reviewed     Problem: Pain Acute  Goal: Optimal Pain Control and Function  Intervention: Prevent or Manage Pain  Recent  Flowsheet Documentation  Taken 4/11/2024 0445 by Lazara Bermudez, RN  Medication Review/Management: medications reviewed  Taken 4/11/2024 0017 by Lazara Bermudez RN  Medication Review/Management: medications reviewed  Intervention: Optimize Psychosocial Wellbeing  Recent Flowsheet Documentation  Taken 4/11/2024 0445 by Lazara Bermudez, RN  Supportive Measures:   active listening utilized   goal-setting facilitated   self-care encouraged   relaxation techniques promoted   positive reinforcement provided  Taken 4/11/2024 0017 by Lazara Bermudez, RN  Supportive Measures:   active listening utilized   goal-setting facilitated   self-care encouraged   relaxation techniques promoted   positive reinforcement provided   Goal Outcome Evaluation:    Pt restless and verbally aggressive through much of shift. Very confused and difficult to redirect or reorient. Slept for approximately 45  minutes through shift. What pt expresses for needs verbally do not always match what she wants. Tolerating treatment plan without adverse effect. Awaiting placement.

## 2024-04-12 ENCOUNTER — APPOINTMENT (OUTPATIENT)
Dept: PHYSICAL THERAPY | Facility: HOSPITAL | Age: 89
DRG: 884 | End: 2024-04-12
Payer: COMMERCIAL

## 2024-04-12 LAB
BACTERIA UR CULT: NORMAL
CREAT SERPL-MCNC: 0.68 MG/DL (ref 0.51–0.95)
EGFRCR SERPLBLD CKD-EPI 2021: 82 ML/MIN/1.73M2
PLATELET # BLD AUTO: 145 10E3/UL (ref 150–450)

## 2024-04-12 PROCEDURE — 120N000001 HC R&B MED SURG/OB

## 2024-04-12 PROCEDURE — 250N000013 HC RX MED GY IP 250 OP 250 PS 637: Performed by: PSYCHIATRY & NEUROLOGY

## 2024-04-12 PROCEDURE — 82565 ASSAY OF CREATININE: CPT | Performed by: INTERNAL MEDICINE

## 2024-04-12 PROCEDURE — 250N000013 HC RX MED GY IP 250 OP 250 PS 637: Performed by: STUDENT IN AN ORGANIZED HEALTH CARE EDUCATION/TRAINING PROGRAM

## 2024-04-12 PROCEDURE — 250N000011 HC RX IP 250 OP 636: Performed by: INTERNAL MEDICINE

## 2024-04-12 PROCEDURE — 250N000013 HC RX MED GY IP 250 OP 250 PS 637: Performed by: INTERNAL MEDICINE

## 2024-04-12 PROCEDURE — 36415 COLL VENOUS BLD VENIPUNCTURE: CPT | Performed by: INTERNAL MEDICINE

## 2024-04-12 PROCEDURE — 97116 GAIT TRAINING THERAPY: CPT | Mod: GP

## 2024-04-12 PROCEDURE — 250N000013 HC RX MED GY IP 250 OP 250 PS 637: Performed by: HOSPITALIST

## 2024-04-12 PROCEDURE — 250N000011 HC RX IP 250 OP 636: Performed by: REGISTERED NURSE

## 2024-04-12 PROCEDURE — 99232 SBSQ HOSP IP/OBS MODERATE 35: CPT | Performed by: STUDENT IN AN ORGANIZED HEALTH CARE EDUCATION/TRAINING PROGRAM

## 2024-04-12 PROCEDURE — 85049 AUTOMATED PLATELET COUNT: CPT | Performed by: INTERNAL MEDICINE

## 2024-04-12 RX ADMIN — ACETAMINOPHEN 1000 MG: 500 TABLET ORAL at 23:03

## 2024-04-12 RX ADMIN — LISINOPRIL 5 MG: 5 TABLET ORAL at 09:35

## 2024-04-12 RX ADMIN — OLANZAPINE 2.5 MG: 10 INJECTION, POWDER, FOR SOLUTION INTRAMUSCULAR at 00:07

## 2024-04-12 RX ADMIN — CYANOCOBALAMIN TAB 1000 MCG 1000 MCG: 1000 TAB at 09:35

## 2024-04-12 RX ADMIN — ACETAMINOPHEN 1000 MG: 500 TABLET ORAL at 12:19

## 2024-04-12 RX ADMIN — Medication 3 MG: at 23:03

## 2024-04-12 RX ADMIN — Medication 3 MG: at 19:05

## 2024-04-12 RX ADMIN — TRAZODONE HYDROCHLORIDE 50 MG: 50 TABLET ORAL at 19:06

## 2024-04-12 RX ADMIN — QUETIAPINE 37.5 MG: 25 TABLET, FILM COATED ORAL at 17:23

## 2024-04-12 RX ADMIN — QUETIAPINE 12.5 MG: 25 TABLET, FILM COATED ORAL at 14:34

## 2024-04-12 RX ADMIN — ENOXAPARIN SODIUM 40 MG: 40 INJECTION SUBCUTANEOUS at 19:08

## 2024-04-12 RX ADMIN — LEVOTHYROXINE SODIUM 100 MCG: 100 TABLET ORAL at 09:33

## 2024-04-12 ASSESSMENT — ACTIVITIES OF DAILY LIVING (ADL)
ADLS_ACUITY_SCORE: 51

## 2024-04-12 NOTE — PROGRESS NOTES
"Care Management Follow Up    Length of Stay (days): 13    Expected Discharge Date: 04/14/2024     Concerns to be Addressed: discharge planning     Patient plan of care discussed at interdisciplinary rounds: Yes    Anticipated Discharge Disposition:  TBD     Anticipated Discharge Services:  NA  Anticipated Discharge DME:  NA    Patient/family educated on Medicare website which has current facility and service quality ratings:  Yes  Education Provided on the Discharge Plan:  Yes  Patient/Family in Agreement with the Plan:  Yes    Referrals Placed by CM/SW:  Post Acute Facilities  Private pay costs discussed: Not applicable    Additional Information:  RNCM placed call to TCU liaison, facility is requesting that patient be off 1:1 if it is for safety, ok if 1:1 is documented as elopement risk for memory care TCU placement.     [facility] they will call and talk with family about possible opportunities for bed openings, awaiting follow up.     Social HX: Mariela was found to be COVID + on 3/27/24. Family had brought her to [facility] ER on 3/27/24, but family took her home because they didn't want to board in the ER. They took her home, but she was \"too much work at home with her worsening confusion, so brought her to [facility] ER.\"     She lives in a house with her daughter Judith. It is Mariela's house. Judith is \"not with her during the daytimes when she is working. Normally this is fine that she is alone sometimes, but over the past few days she has had increased confusion and increased agitation.\" \"She most often uses the cane. Also has a 4WW if needed\".     She is independent with most ADLs and gets help with all IADLs.     CM will continue to follow care progression and aide in discharge planning as needed.     2:58 PM - Discussed with [facility] of [facility], will call to arrange assessment for patient on Monday. Selene [phone]    Shyanne Collins RN      "

## 2024-04-12 NOTE — PLAN OF CARE
Problem: Risk for Delirium  Goal: Improved Sleep  Outcome: Progressing  Reportedly, patient had been asleep from approximately 0400 on.   Awoke during this shift at 1000.    Problem: Oral Intake Inadequate  Goal: Improved Oral Intake  Outcome: Progressing  Patient consumed 100% of breakfast (and 100% of Ensure supplement).  Consumed 25% of lunch.     Problem: Pain Acute  Goal: Optimal Pain Control and Function  Outcome: Progressing  Tylenol PRN given during this shift for generalized pain; appears to have been effective.    Ambulated in [place] x3 during this shift.    X1 PRN Seroquel given at 1434 for increasing agitation.     Marcela Phillip RN

## 2024-04-12 NOTE — PLAN OF CARE
Problem: Adult Inpatient Plan of Care  Goal: Plan of Care Review  Outcome: Progressing  Flowsheets (Taken 4/12/2024 0250)  Plan of Care Reviewed With: patient  Overall Patient Progress: improving     Problem: Adult Inpatient Plan of Care  Goal: Patient-Specific Goal (Individualized)  Outcome: Progressing     Problem: Adult Inpatient Plan of Care  Goal: Readiness for Transition of Care  Outcome: Progressing     Problem: Confusion Acute  Goal: Optimal Cognitive Function  Outcome: Progressing     Problem: UTI (Urinary Tract Infection)  Goal: Improved Infection Symptoms  Outcome: Progressing     Problem: Pain Acute  Goal: Optimal Pain Control and Function  Outcome: Progressing   Goal Outcome Evaluation: Pt restless for most of the shift, got OOB multiple times. 1:1 sitter at bedside for safety. PRN zyprexa given, pt able to get some sleep much later after administration. SBA with cane to bathroom.      Plan of Care Reviewed With: patient    Overall Patient Progress: improvingOverall Patient Progress: improving

## 2024-04-12 NOTE — PLAN OF CARE
"  Problem: Adult Inpatient Plan of Care  Goal: Plan of Care Review  Description: The Plan of Care Review/Shift note should be completed every shift.  The Outcome Evaluation is a brief statement about your assessment that the patient is improving, declining, or no change.  This information will be displayed automatically on your shift  note.  Outcome: Progressing  Goal: Patient-Specific Goal (Individualized)  Description: You can add care plan individualizations to a care plan. Examples of Individualization might be:  \"Parent requests to be called daily at 9am for status\", \"I have a hard time hearing out of my right ear\", or \"Do not touch me to wake me up as it startles  me\".  Outcome: Progressing  Goal: Absence of Hospital-Acquired Illness or Injury  Outcome: Progressing  Intervention: Identify and Manage Fall Risk  Recent Flowsheet Documentation  Taken 4/11/2024 1600 by Zita Camara  Safety Promotion/Fall Prevention:   activity supervised   assistive device/personal items within reach   mobility aid in reach   nonskid shoes/slippers when out of bed   safety round/check completed  Goal: Optimal Comfort and Wellbeing  Outcome: Progressing  Intervention: Provide Person-Centered Care  Recent Flowsheet Documentation  Taken 4/11/2024 1600 by Zita Camara  Trust Relationship/Rapport:   care explained   emotional support provided   questions answered   thoughts/feelings acknowledged  Goal: Readiness for Transition of Care  Outcome: Progressing     Problem: Risk for Delirium  Goal: Optimal Coping  Outcome: Progressing  Intervention: Optimize Psychosocial Adjustment to Delirium  Recent Flowsheet Documentation  Taken 4/11/2024 1600 by Zita Camara  Supportive Measures:   active listening utilized   verbalization of feelings encouraged  Goal: Improved Behavioral Control  Outcome: Progressing  Intervention: Prevent and Manage Agitation  Recent Flowsheet Documentation  Taken 4/11/2024 1600 by Zita Camara  Environment " Familiarity/Consistency:   daily routine followed   familiar objects from home provided   personal clothing/items utilized  Intervention: Minimize Safety Risk  Recent Flowsheet Documentation  Taken 4/11/2024 1600 by Zita Camara  Communication Enhancement Strategies:   call light answered in person   extra time allowed for response   one-step directions provided   repetition utilized   verbal and visual cues paired  Enhanced Safety Measures:   assistive devices when indicated   patient/family teach back on injury risk  Trust Relationship/Rapport:   care explained   emotional support provided   questions answered   thoughts/feelings acknowledged  Goal: Improved Attention and Thought Clarity  Outcome: Progressing  Intervention: Maximize Cognitive Function  Recent Flowsheet Documentation  Taken 4/11/2024 1600 by Zita Camara  Sensory Stimulation Regulation: care clustered  Reorientation Measures: clock in view  Goal: Improved Sleep  Outcome: Progressing     Problem: Infection  Goal: Absence of Infection Signs and Symptoms  Outcome: Progressing     Problem: Comorbidity Management  Goal: Blood Pressure in Desired Range  Outcome: Progressing  Intervention: Maintain Blood Pressure Management  Recent Flowsheet Documentation  Taken 4/11/2024 1600 by Zita Camara  Medication Review/Management: medications reviewed  Goal: Blood Glucose Levels Within Targeted Range  Outcome: Progressing  Intervention: Monitor and Manage Glycemia  Recent Flowsheet Documentation  Taken 4/11/2024 1600 by Zita Camara  Medication Review/Management: medications reviewed     Problem: Confusion Acute  Goal: Optimal Cognitive Function  Outcome: Progressing  Intervention: Minimize Contributing Factors  Recent Flowsheet Documentation  Taken 4/11/2024 1600 by Zita Camara  Sensory Stimulation Regulation: care clustered  Reorientation Measures: clock in view  Communication Support Strategies:   active listening utilized   extra time allowed for response   simple  "statements used  Environment Familiarity/Consistency:   daily routine followed   familiar objects from home provided   personal clothing/items utilized     Problem: Gas Exchange Impaired  Goal: Optimal Gas Exchange  Outcome: Progressing     Problem: Adult Inpatient Plan of Care  Goal: Plan of Care Review  Description: The Plan of Care Review/Shift note should be completed every shift.  The Outcome Evaluation is a brief statement about your assessment that the patient is improving, declining, or no change.  This information will be displayed automatically on your shift  note.  Outcome: Progressing  Goal: Patient-Specific Goal (Individualized)  Description: You can add care plan individualizations to a care plan. Examples of Individualization might be:  \"Parent requests to be called daily at 9am for status\", \"I have a hard time hearing out of my right ear\", or \"Do not touch me to wake me up as it startles  me\".  Outcome: Progressing  Goal: Absence of Hospital-Acquired Illness or Injury  Outcome: Progressing  Intervention: Identify and Manage Fall Risk  Recent Flowsheet Documentation  Taken 4/11/2024 1600 by Zita Camara  Safety Promotion/Fall Prevention:   activity supervised   assistive device/personal items within reach   mobility aid in reach   nonskid shoes/slippers when out of bed   safety round/check completed  Goal: Optimal Comfort and Wellbeing  Outcome: Progressing  Intervention: Provide Person-Centered Care  Recent Flowsheet Documentation  Taken 4/11/2024 1600 by Zita Camara  Trust Relationship/Rapport:   care explained   emotional support provided   questions answered   thoughts/feelings acknowledged  Goal: Readiness for Transition of Care  Outcome: Progressing     Problem: Comorbidity Management  Goal: Blood Glucose Levels Within Targeted Range  Outcome: Progressing  Intervention: Monitor and Manage Glycemia  Recent Flowsheet Documentation  Taken 4/11/2024 1600 by Zita Camara  Medication Review/Management: " medications reviewed  Goal: Maintenance of Asthma Control  Outcome: Progressing  Intervention: Maintain Asthma Symptom Control  Recent Flowsheet Documentation  Taken 4/11/2024 1600 by Zita Camara  Medication Review/Management: medications reviewed  Goal: Maintenance of Behavioral Health Symptom Control  Outcome: Progressing  Intervention: Maintain Behavioral Health Symptom Control  Recent Flowsheet Documentation  Taken 4/11/2024 1600 by Zita Camara  Medication Review/Management: medications reviewed  Goal: Maintenance of COPD Symptom Control  Outcome: Progressing  Intervention: Maintain COPD Symptom Control  Recent Flowsheet Documentation  Taken 4/11/2024 1600 by Zita Camara  Supportive Measures:   active listening utilized   verbalization of feelings encouraged  Medication Review/Management: medications reviewed  Goal: Maintenance of Heart Failure Symptom Control  Outcome: Progressing  Intervention: Maintain Heart Failure Management  Recent Flowsheet Documentation  Taken 4/11/2024 1600 by Zita Camara  Medication Review/Management: medications reviewed  Goal: Blood Pressure in Desired Range  Outcome: Progressing  Intervention: Maintain Blood Pressure Management  Recent Flowsheet Documentation  Taken 4/11/2024 1600 by Zita Camara  Medication Review/Management: medications reviewed  Goal: Maintenance of Osteoarthritis Symptom Control  Outcome: Progressing  Intervention: Maintain Osteoarthritis Symptom Control  Recent Flowsheet Documentation  Taken 4/11/2024 1600 by Zita Camara  Activity Management: up in chair  Medication Review/Management: medications reviewed  Goal: Bariatric Home Regimen Maintained  Outcome: Progressing  Intervention: Maintain and Manage Postbariatric Surgery Care  Recent Flowsheet Documentation  Taken 4/11/2024 1600 by Zita Camara  Medication Review/Management: medications reviewed  Goal: Maintenance of Seizure Control  Outcome: Progressing  Intervention: Maintain Seizure Symptom Control  Recent Flowsheet  Documentation  Taken 4/11/2024 1600 by Zita Camara  Sensory Stimulation Regulation: care clustered  Medication Review/Management: medications reviewed     Problem: UTI (Urinary Tract Infection)  Goal: Improved Infection Symptoms  Outcome: Progressing     Problem: Oral Intake Inadequate  Goal: Improved Oral Intake  Outcome: Progressing     Problem: Pain Acute  Goal: Optimal Pain Control and Function  Outcome: Progressing  Intervention: Prevent or Manage Pain  Recent Flowsheet Documentation  Taken 4/11/2024 1600 by Zita Camara  Sensory Stimulation Regulation: care clustered  Bowel Elimination Promotion: adequate fluid intake promoted  Medication Review/Management: medications reviewed  Intervention: Optimize Psychosocial Wellbeing  Recent Flowsheet Documentation  Taken 4/11/2024 1600 by Zita Camara  Supportive Measures:   active listening utilized   verbalization of feelings encouraged   Goal Outcome Evaluation:    Pt ate 75% of her dinner. Family was here visiting for awhile. Went for a walk around the unit. Pt confused and restless, not wanting to lay down. Pt OOB several times. Pt agitated with bed alarm. Given PRN Seroquel at 2134. Kept pt awake during the day to attempt to help with bedtime. Pt reports no pain. Good urine output, loose BM three times, incontinent. Pt very [place].

## 2024-04-12 NOTE — PROGRESS NOTES
[facility]    Medicine Progress Note - Hospitalist Service    Date of Admission:  3/30/2024    Assessment & Plan   Mariela Ortiz is a 89 year old female admitted on 3/30/2024. She  presented to the ER with complaint of flulike symptoms for the past 3 weeks associated with cough, generalized weakness, poor appetite and oral intake, diarrhea, decreased mobility and confusion.    -- She was found to have possible UTI (completed antibiotic course) and positive COVID PCR however did not receive any treatment since she has not been hypoxic.      -- Her mental status has been fluctuating, suspect delirium in the setting of undiagnosed cognitive impairment/dementia.    -- 04/11: Back to one-to-one today.  Ongoing discussions with family regarding discharge plan (TCU versus home)   -- 04/12: pt was restless early last night but slept better later on. Currently off of 1:1 since this morning    Acute encephalopathy in the setting of likely undiagnosed dementia.    -- CT head shows moderate generalized volume loss with severe presumed chronic small vessel ischemic changes  -- Considered COVID recovered.  Completed treatment for UTI.  Mental status continues to fluctuate, suspect delirium.    -- Still needs one-to-one for safety.  Trazodone and melatonin at bedtime   -- 04/11: Scheduled seroquel in early evening increased to 37.5 mg  -- As needed Seroquel and olanzapine for agitation    Possible UTI  -- Urine culture with >100K mixture of pathogens.  Completed antibiotic course.    -- Family requested repeat UA however unable to obtain.     Recovered COVID  -- Did not have any specific treatment as she was not hypoxic and onset of illness was probably a few weeks ago    Left leg swelling  -- Ultrasound with no evidence of DVT.    Diarrhea  -- Unable to obtain stool sample.  Could use Imodium as needed    CLL  -- Stable    Hypothyroidism   -- Continue PTA levothyroxine    Essential hypertension  --   Continue PTA lisinopril    Goals of care:  DNR/DNI.  Goals are restorative.  Dr. Lyle discussed in detail with daughters on 4/8. 04/11: Discussed with daughter Catalina at bedside          Diet: Combination Diet Regular Diet Adult  Snacks/Supplements Adult: Ensure Enlive; With Meals    DVT Prophylaxis: Enoxaparin (Lovenox) SQ  Zaman Catheter: Not present  Lines: None     Cardiac Monitoring: None  Code Status: No CPR- Do NOT Intubate      Clinically Significant Risk Factors              # Hypoalbuminemia: Lowest albumin = 3.4 g/dL at 3/31/2024  5:17 AM, will monitor as appropriate     # Hypertension: Noted on problem list     # Dementia: noted on problem list               Disposition Plan     Medically Ready for Discharge: Anticipated in 2-4 Days             Jess Chand MD  Hospitalist Service  [facility]  Securely message with [employer] (more info)  Text page via [employer] Paging/Directory   ______________________________________________________________________    Interval History   Patient is seen and examined at the bedside.  Pt is off of one to one since this morning.      Physical Exam   Vital Signs: Temp: 98.2  F (36.8  C) Temp src: Oral BP: 103/51 Pulse: 63   Resp: 18 SpO2: 97 % O2 Device: None (Room air)    Weight: 120 lbs 9.6 oz    GEN: Alert. Not in acute distress.  HEENT: Atraumatic, mucous membrane- moist and pink.  Chest: Bilateral air entry.  CVS: S1S2 regular.   Abdomen: Soft. Non-tender, non-distended. No organomegaly. No guarding or rigidity. Bowel sounds active.   Extremities: No pedal edema.  CNS: No involuntary movements.  Skin: no cyanosis or clubbing.     Medical Decision Making       45 MINUTES SPENT BY ME on the date of service doing chart review, history, exam, documentation & further activities per the note.      Data

## 2024-04-12 NOTE — PROGRESS NOTES
Patient alert, cooperative.   1:1 discontinued at 10:15am.  Alarms on for patient safety.    Marcela Phillip RN

## 2024-04-12 NOTE — PLAN OF CARE
"  Problem: Adult Inpatient Plan of Care  Description: The Care Plan Review/Shift Note, Individualized Goals, Hospital-Acquired Illness or Injury, Comfort and Wellbeing, and Transition Planning are the \"Overarching Goals\" and should be updated throughout the hospitalization.  Please hover over the (i) for specific information on each goal topic.  Goal: Plan of Care Review  Description: The Plan of Care Review/Shift note should be completed every shift.  The Outcome Evaluation is a brief statement about your assessment that the patient is improving, declining, or no change.  This information will be displayed automatically on your shift  note.  Outcome: Progressing  Flowsheets (Taken 4/12/2024 2236)  Outcome Evaluation: Pt intake is variable d/t dementia. Eating % of 3 larger meal/day. Appears to eat 1 meal/day >75%. Taking ensure. good fluid intake, 1839 ml yesterday. Last wt 4/1-ordered new wt today. 6 loose BM yesterday-prn immodium not being utilized - added treatment team sticky note to have this be utilized for loose BM. Soft BM x 2 day prior. hx chronic diarrhea  Plan of Care Reviewed With: patient  Overall Patient Progress: no change     Problem: Oral Intake Inadequate  Goal: Improved Oral Intake  Outcome: Progressing     Problem: Adult Inpatient Plan of Care  Description: The Care Plan Review/Shift Note, Individualized Goals, Hospital-Acquired Illness or Injury, Comfort and Wellbeing, and Transition Planning are the \"Overarching Goals\" and should be updated throughout the hospitalization.  Please hover over the (i) for specific information on each goal topic.  Goal: Plan of Care Review  Description: The Plan of Care Review/Shift note should be completed every shift.  The Outcome Evaluation is a brief statement about your assessment that the patient is improving, declining, or no change.  This information will be displayed automatically on your shift  note.  Recent Flowsheet Documentation  Taken " 4/12/2024 1145 by Olga Cook, MARIA GUADALUPE  Outcome Evaluation: Pt intake is variable d/t dementia. Eating % of 3 larger meal/day. Appears to eat 1 meal/day >75%. Taking ensure. good fluid intake, 1839 ml yesterday. Last wt 4/1-ordered new wt today. 6 loose BM yesterday-prn immodium not being utilized - added treatment team sticky note to have this be utilized for loose BM. Soft BM x 2 day prior. hx chronic diarrhea  Plan of Care Reviewed With: patient  Overall Patient Progress: no change   Goal Outcome Evaluation:      Plan of Care Reviewed With: patient    Overall Patient Progress: no changeOverall Patient Progress: no change    Outcome Evaluation: Pt intake is variable d/t dementia. Eating % of 3 larger meal/day. Appears to eat 1 meal/day >75%. Taking ensure. good fluid intake, 1839 ml yesterday. Last wt 4/1-ordered new wt today. 6 loose BM yesterday-prn immodium not being utilized - added treatment team sticky note to have this be utilized for loose BM. Soft BM x 2 day prior. hx chronic diarrhea

## 2024-04-13 PROCEDURE — 250N000013 HC RX MED GY IP 250 OP 250 PS 637: Performed by: STUDENT IN AN ORGANIZED HEALTH CARE EDUCATION/TRAINING PROGRAM

## 2024-04-13 PROCEDURE — 99232 SBSQ HOSP IP/OBS MODERATE 35: CPT | Performed by: STUDENT IN AN ORGANIZED HEALTH CARE EDUCATION/TRAINING PROGRAM

## 2024-04-13 PROCEDURE — 120N000001 HC R&B MED SURG/OB

## 2024-04-13 PROCEDURE — 250N000013 HC RX MED GY IP 250 OP 250 PS 637: Performed by: PSYCHIATRY & NEUROLOGY

## 2024-04-13 PROCEDURE — 250N000011 HC RX IP 250 OP 636: Performed by: INTERNAL MEDICINE

## 2024-04-13 PROCEDURE — 250N000013 HC RX MED GY IP 250 OP 250 PS 637: Performed by: HOSPITALIST

## 2024-04-13 RX ADMIN — Medication 3 MG: at 18:06

## 2024-04-13 RX ADMIN — ACETAMINOPHEN 1000 MG: 500 TABLET ORAL at 18:56

## 2024-04-13 RX ADMIN — QUETIAPINE 12.5 MG: 25 TABLET, FILM COATED ORAL at 03:25

## 2024-04-13 RX ADMIN — ENOXAPARIN SODIUM 40 MG: 40 INJECTION SUBCUTANEOUS at 18:08

## 2024-04-13 RX ADMIN — TRAZODONE HYDROCHLORIDE 50 MG: 50 TABLET ORAL at 18:06

## 2024-04-13 RX ADMIN — LEVOTHYROXINE SODIUM 100 MCG: 100 TABLET ORAL at 07:41

## 2024-04-13 RX ADMIN — CYANOCOBALAMIN TAB 1000 MCG 1000 MCG: 1000 TAB at 09:52

## 2024-04-13 RX ADMIN — ACETAMINOPHEN 1000 MG: 500 TABLET ORAL at 07:41

## 2024-04-13 RX ADMIN — DICLOFENAC 2 G: 10 GEL TOPICAL at 02:12

## 2024-04-13 RX ADMIN — DICLOFENAC 2 G: 10 GEL TOPICAL at 09:58

## 2024-04-13 RX ADMIN — QUETIAPINE 12.5 MG: 25 TABLET, FILM COATED ORAL at 12:21

## 2024-04-13 RX ADMIN — QUETIAPINE 37.5 MG: 25 TABLET, FILM COATED ORAL at 16:27

## 2024-04-13 ASSESSMENT — ACTIVITIES OF DAILY LIVING (ADL)
ADLS_ACUITY_SCORE: 49
ADLS_ACUITY_SCORE: 40
ADLS_ACUITY_SCORE: 42
ADLS_ACUITY_SCORE: 49
ADLS_ACUITY_SCORE: 42
ADLS_ACUITY_SCORE: 49
ADLS_ACUITY_SCORE: 45
ADLS_ACUITY_SCORE: 51
ADLS_ACUITY_SCORE: 40
ADLS_ACUITY_SCORE: 49
ADLS_ACUITY_SCORE: 45
ADLS_ACUITY_SCORE: 49
ADLS_ACUITY_SCORE: 49
ADLS_ACUITY_SCORE: 45
ADLS_ACUITY_SCORE: 42
ADLS_ACUITY_SCORE: 49

## 2024-04-13 NOTE — PROGRESS NOTES
Care Management Follow Up    Length of Stay (days): 14    Expected Discharge Date: 04/15/2024    Concerns to be Addressed:   Discharge planning  Patient plan of care discussed at interdisciplinary rounds: Yes    Anticipated Discharge Disposition:  Home.      Anticipated Discharge Services:  To be determined.   Anticipated Discharge DME:  Per therapy (if indicated).    Patient/family educated on Medicare website which has current facility and service quality ratings:   NA  Education Provided on the Discharge Plan:   Per team  Patient/Family in Agreement with the Plan:   Yes    Referrals Placed by CM/SW:  See previous notes;    Private pay costs discussed: Not applicable     Additional Information:  Met with trista Brown at the bedside. They are now considering taking patient home at discharge. Patient lives with daughter Judith. They feel she would do better at home. However, they will still pursue memory care assisted living.  They would like to meet with MD on Monday to go over her medications to ensure they have everything they need. Text page sent to MD.     Jayla Trivedi RN

## 2024-04-13 NOTE — PLAN OF CARE
Problem: Risk for Delirium  Goal: Improved Behavioral Control  4/13/2024 1843 by Marcela Phillip, RN  Outcome: Not Progressing  Fluctuating cognition and impulsivity.  X1 dose of PRN Seroquel given.  Patient did have an unwitnessed fall during this shift (see note).  After fall, 1:1 sitter was restarted to ensure safety.    Problem: Adult Inpatient Plan of Care  Goal: Optimal Comfort and Wellbeing  4/13/2024 1843 by Marcela Phillip, RN  Outcome: Progressing  PRN Tylenol & Volaren gel given for generalized pain; appears effective.    Marcela Phillip, RN   Quality 111:Pneumonia Vaccination Status For Older Adults: Pneumococcal Vaccination Administered Quality 110: Preventive Care And Screening: Influenza Immunization: Influenza Immunization previously received during influenza season Detail Level: Detailed Quality 226: Preventive Care And Screening: Tobacco Use: Screening And Cessation Intervention: Patient screened for tobacco use, is a smoker AND received Cessation Counseling

## 2024-04-13 NOTE — SIGNIFICANT EVENT
Patient found sitting on ground next to chair with chair alarm activated at 12:50.  Patient had been sitting in chair, reports she was trying to transfer self to bathroom.  Patient had previously been a 1:1, but had been less agitated this morning so staff had been trialing off 1:1 sitter care.  Patient had been alone 5 minutes prior to fall.  Post fall vital signs stable.  Denies pain.  Small abrasion noted on right upper back, otherwise no new findings with post-fall assessment.  Hospitalist, Dr. Chand, notified via [facility] system.  Daughter, Stephanie, notified in person.  Patient placed back on 1:1 for safety.     Marcela Phillip RN

## 2024-04-13 NOTE — PLAN OF CARE
Problem: Adult Inpatient Plan of Care  Goal: Plan of Care Review  Description: The Plan of Care Review/Shift note should be completed every shift.  The Outcome Evaluation is a brief statement about your assessment that the patient is improving, declining, or no change.  This information will be displayed automatically on your shift  note.  Outcome: Progressing     Problem: Risk for Delirium  Goal: Improved Behavioral Control  Outcome: Progressing     Problem: Pain Acute  Goal: Optimal Pain Control and Function  Outcome: Progressing     Problem: Confusion Acute  Goal: Optimal Cognitive Function  Outcome: Progressing   Goal Outcome Evaluation:  Pt is alert to self, disoriented with place, time and situation. Slept from 0015AM  and then woke up to go to the bathroom. Pt was impulsive, comes out of bed and goes to bathroom using walker, stand by assist. Had small BM x2 and urinates everytime she goes to bathroom. Noted pt did not urinate or Bm for 2x she went to bathroom,10 minutes after she had small BM. Pt appears more confused,  given Seroquel 12.5 mg at 0325AM. Pt slept at 0410AM. Went to bathroom at 0530 AM had small BM and urinated. Pt complains being cold then given blankets and warmer, then complains of heat and throws her blankets. After 30 mins she wanted all the blankets, also air warmer placed on.

## 2024-04-13 NOTE — PROGRESS NOTES
[facility]    Medicine Progress Note - Hospitalist Service    Date of Admission:  3/30/2024    Assessment & Plan   Mariela Ortiz is a 89 year old female admitted on 3/30/2024. She  presented to the ER with complaint of flulike symptoms for the past 3 weeks associated with cough, generalized weakness, poor appetite and oral intake, diarrhea, decreased mobility and confusion.    -- She was found to have possible UTI (completed antibiotic course) and positive COVID PCR however did not receive any treatment since she has not been hypoxic.      -- Her mental status has been fluctuating, suspect delirium in the setting of undiagnosed cognitive impairment/dementia.    -- 04/11: Back to one-to-one today.  Ongoing discussions with family regarding discharge plan (TCU versus home)   -- 04/12: pt was restless early last night but slept better later on. Currently off of 1:1 since this morning  -- 04/13: back on 1:1 due to risk of fall    Acute encephalopathy in the setting of likely undiagnosed dementia.    -- CT head shows moderate generalized volume loss with severe presumed chronic small vessel ischemic changes  -- Considered COVID recovered.  Completed treatment for UTI.  Mental status continues to fluctuate, suspect delirium.    -- Still needs one-to-one for safety.  Trazodone and melatonin at bedtime   -- 04/11: Scheduled seroquel in early evening increased to 37.5 mg  -- As needed Seroquel and olanzapine for agitation  -- 04/13: Pt was found sitting on the floor next to chair while was alone for 5 minutes per RN. On exam no significant findings. Pt denied pain and was able to move as before. No trauma to head. 1:1 for safety.    Possible UTI  -- Urine culture with >100K mixture of pathogens.  Completed antibiotic course.    -- Family requested repeat UA however unable to obtain.     Recovered COVID  -- Did not have any specific treatment as she was not hypoxic and onset of illness was probably  a few weeks ago    Left leg swelling  -- Ultrasound with no evidence of DVT.    Diarrhea  -- Unable to obtain stool sample.  Could use Imodium as needed    CLL  -- Stable    Hypothyroidism   -- Continue PTA levothyroxine    Essential hypertension  --  Continue PTA lisinopril    Goals of care:  DNR/DNI.  Goals are restorative.  Dr. Lyle discussed in detail with daughters on 4/8. 04/11: Discussed with daughter Catalina at bedside          Diet: Combination Diet Regular Diet Adult  Snacks/Supplements Adult: Ensure Enlive; With Meals    DVT Prophylaxis: Enoxaparin (Lovenox) SQ  Zaman Catheter: Not present  Lines: None     Cardiac Monitoring: None  Code Status: No CPR- Do NOT Intubate      Clinically Significant Risk Factors              # Hypoalbuminemia: Lowest albumin = 3.4 g/dL at 3/31/2024  5:17 AM, will monitor as appropriate     # Hypertension: Noted on problem list     # Dementia: noted on problem list               Disposition Plan     Medically Ready for Discharge: Anticipated in 2-4 Days             Jess Chand MD  Hospitalist Service  [facility]  Securely message with [employer] (more info)  Text page via [employer] Paging/Directory   ______________________________________________________________________    Interval History   Patient is seen and examined at the bedside.  Pt was found sitting on the floor next to chair while was alone for 5 minutes per RN. Pt denied pain and was able to move as before.     Physical Exam   Vital Signs: Temp: 97.9  F (36.6  C) Temp src: Oral BP: 130/56 Pulse: 71   Resp: 20 SpO2: 93 % O2 Device: None (Room air)    Weight: 126 lbs 8.7 oz    GEN: Alert. Not in acute distress.  HEENT: Atraumatic, mucous membrane- moist and pink.  Chest: Bilateral air entry.  CVS: S1S2 regular.   Abdomen: Soft. Non-tender, non-distended. No organomegaly. No guarding or rigidity. Bowel sounds active.   Extremities: No pedal edema.  CNS: No involuntary movements.  Skin: no  cyanosis or clubbing.     Medical Decision Making       46 MINUTES SPENT BY ME on the date of service doing chart review, history, exam, documentation & further activities per the note.      Data

## 2024-04-14 PROCEDURE — 250N000013 HC RX MED GY IP 250 OP 250 PS 637: Performed by: PSYCHIATRY & NEUROLOGY

## 2024-04-14 PROCEDURE — 250N000013 HC RX MED GY IP 250 OP 250 PS 637: Performed by: STUDENT IN AN ORGANIZED HEALTH CARE EDUCATION/TRAINING PROGRAM

## 2024-04-14 PROCEDURE — 250N000013 HC RX MED GY IP 250 OP 250 PS 637: Performed by: HOSPITALIST

## 2024-04-14 PROCEDURE — 250N000013 HC RX MED GY IP 250 OP 250 PS 637: Performed by: INTERNAL MEDICINE

## 2024-04-14 PROCEDURE — 99232 SBSQ HOSP IP/OBS MODERATE 35: CPT | Performed by: STUDENT IN AN ORGANIZED HEALTH CARE EDUCATION/TRAINING PROGRAM

## 2024-04-14 PROCEDURE — 120N000001 HC R&B MED SURG/OB

## 2024-04-14 RX ADMIN — TRAZODONE HYDROCHLORIDE 50 MG: 50 TABLET ORAL at 18:39

## 2024-04-14 RX ADMIN — ACETAMINOPHEN 1000 MG: 500 TABLET ORAL at 09:21

## 2024-04-14 RX ADMIN — ACETAMINOPHEN 1000 MG: 500 TABLET ORAL at 00:57

## 2024-04-14 RX ADMIN — Medication 3 MG: at 18:39

## 2024-04-14 RX ADMIN — LEVOTHYROXINE SODIUM 100 MCG: 100 TABLET ORAL at 09:16

## 2024-04-14 RX ADMIN — QUETIAPINE 12.5 MG: 25 TABLET, FILM COATED ORAL at 00:58

## 2024-04-14 RX ADMIN — CYANOCOBALAMIN TAB 1000 MCG 1000 MCG: 1000 TAB at 09:16

## 2024-04-14 RX ADMIN — ACETAMINOPHEN 1000 MG: 500 TABLET ORAL at 21:47

## 2024-04-14 RX ADMIN — LISINOPRIL 5 MG: 5 TABLET ORAL at 09:21

## 2024-04-14 RX ADMIN — Medication 3 MG: at 21:47

## 2024-04-14 RX ADMIN — TRAZODONE HYDROCHLORIDE 50 MG: 50 TABLET ORAL at 00:59

## 2024-04-14 RX ADMIN — QUETIAPINE 37.5 MG: 25 TABLET, FILM COATED ORAL at 17:42

## 2024-04-14 ASSESSMENT — ACTIVITIES OF DAILY LIVING (ADL)
ADLS_ACUITY_SCORE: 45
ADLS_ACUITY_SCORE: 42
ADLS_ACUITY_SCORE: 45
ADLS_ACUITY_SCORE: 42
ADLS_ACUITY_SCORE: 45

## 2024-04-14 NOTE — PLAN OF CARE
Problem: Risk for Delirium  Goal: Improved Behavioral Control  Outcome: Progressing  Fluctuating cognition.  Impulsive; does not call for assistance with transfers.  1:1 remains in place for patient safety.    Problem: Pain Acute  Goal: Optimal Pain Control and Function  Outcome: Progressing  Tylenol given for generalized pain; appears effective.    Marcela Phillip RN

## 2024-04-14 NOTE — PROGRESS NOTES
[facility]    Medicine Progress Note - Hospitalist Service    Date of Admission:  3/30/2024    Assessment & Plan   Mariela Ortiz is a 89 year old female admitted on 3/30/2024. She  presented to the ER with complaint of flulike symptoms for the past 3 weeks associated with cough, generalized weakness, poor appetite and oral intake, diarrhea, decreased mobility and confusion.    -- She was found to have possible UTI (completed antibiotic course) and positive COVID PCR however did not receive any treatment since she has not been hypoxic.      -- Her mental status has been fluctuating, suspect delirium in the setting of undiagnosed cognitive impairment/dementia.    -- 04/11: Back to one-to-one today.  Ongoing discussions with family regarding discharge plan (TCU versus home)   -- 04/12: pt was restless early last night but slept better later on. Currently off of 1:1 since this morning  -- 04/13: back on 1:1 due to risk of fall    Acute encephalopathy in the setting of likely undiagnosed dementia.    -- CT head shows moderate generalized volume loss with severe presumed chronic small vessel ischemic changes  -- Considered COVID recovered.  Completed treatment for UTI.  Mental status continues to fluctuate, suspect delirium.    -- Still needs one-to-one for safety.  Trazodone and melatonin at bedtime   -- 04/11: Scheduled seroquel in early evening increased to 37.5 mg  -- As needed Seroquel and olanzapine for agitation  -- 04/13: Pt was found sitting on the floor next to chair while was alone for 5 minutes per RN. On exam no significant findings. Pt denied pain and was able to move as before. No trauma to head. 1:1 for safety.    Possible UTI  -- Urine culture with >100K mixture of pathogens.  Completed antibiotic course.    -- Family requested repeat UA however unable to obtain.     Recovered COVID  -- Did not have any specific treatment as she was not hypoxic and onset of illness was probably  a few weeks ago    Left leg swelling  -- Ultrasound with no evidence of DVT.    Diarrhea  -- Unable to obtain stool sample.  Could use Imodium as needed    CLL  -- Stable    Hypothyroidism   -- Continue PTA levothyroxine    Essential hypertension  --  Continue PTA lisinopril    Goals of care:  DNR/DNI.  Goals are restorative.  Dr. Lyle discussed in detail with daughters on 4/8. 04/11: Discussed with daughter Catalina at bedside  -- 04/14: Discussed with daughter Ginger over the phone regarding pt's care          Diet: Combination Diet Regular Diet Adult  Snacks/Supplements Adult: Ensure Enlive; With Meals    DVT Prophylaxis: Enoxaparin (Lovenox) SQ  Zaman Catheter: Not present  Lines: None     Cardiac Monitoring: None  Code Status: No CPR- Do NOT Intubate      Clinically Significant Risk Factors              # Hypoalbuminemia: Lowest albumin = 3.4 g/dL at 3/31/2024  5:17 AM, will monitor as appropriate     # Hypertension: Noted on problem list     # Dementia: noted on problem list               Disposition Plan     Medically Ready for Discharge: Anticipated Tomorrow             Jess Chand MD  Hospitalist Service  [facility]  Securely message with [employer] (more info)  Text page via [employer] Paging/Directory   ______________________________________________________________________    Interval History   Patient is seen and examined at the bedside.  Pt denied any pain. Pt is on 1:1. No events noted overnight.    Physical Exam   Vital Signs: Temp: 98  F (36.7  C) Temp src: Oral BP: 110/56 Pulse: 69   Resp: 18 SpO2: 96 % O2 Device: None (Room air)    Weight: 126 lbs 8.7 oz    GEN: Alert. Not in acute distress.  HEENT: Atraumatic, mucous membrane- moist and pink.  Chest: Bilateral air entry.  CVS: S1S2 regular.   Abdomen: Soft. Non-tender, non-distended. No organomegaly. No guarding or rigidity. Bowel sounds active.   Extremities: No pedal edema.  CNS: No involuntary movements.  Skin: no  cyanosis or clubbing.     Medical Decision Making       45 MINUTES SPENT BY ME on the date of service doing chart review, history, exam, documentation & further activities per the note.      Data

## 2024-04-14 NOTE — PLAN OF CARE
"  Problem: Adult Inpatient Plan of Care  Goal: Absence of Hospital-Acquired Illness or Injury  Outcome: Progressing  Intervention: Identify and Manage Fall Risk  Recent Flowsheet Documentation  Taken 4/14/2024 0000 by Sudarshan Woods RN  Safety Promotion/Fall Prevention:   activity supervised   assistive device/personal items within reach   room door open   room near nurse's station   safety round/check completed   supervised activity   nonskid shoes/slippers when out of bed   increase visualization of patient   increased rounding and observation   clutter free environment maintained   bedside attendant  Taken 4/13/2024 2045 by Sudarshan Woods, RN  Safety Promotion/Fall Prevention:   activity supervised   assistive device/personal items within reach   room door open   room near nurse's station   safety round/check completed   supervised activity   nonskid shoes/slippers when out of bed   increase visualization of patient   increased rounding and observation   clutter free environment maintained   bedside attendant     Problem: Adult Inpatient Plan of Care  Goal: Plan of Care Review  Description: The Plan of Care Review/Shift note should be completed every shift.  The Outcome Evaluation is a brief statement about your assessment that the patient is improving, declining, or no change.  This information will be displayed automatically on your shift  note.  Outcome: Progressing  Goal: Patient-Specific Goal (Individualized)  Description: You can add care plan individualizations to a care plan. Examples of Individualization might be:  \"Parent requests to be called daily at 9am for status\", \"I have a hard time hearing out of my right ear\", or \"Do not touch me to wake me up as it startles  me\".  Outcome: Progressing  Goal: Absence of Hospital-Acquired Illness or Injury  Outcome: Progressing  Intervention: Identify and Manage Fall Risk  Recent Flowsheet Documentation  Taken 4/14/2024 0000 by Sudarshan Woods, RN  Safety " Promotion/Fall Prevention:   activity supervised   assistive device/personal items within reach   room door open   room near nurse's station   safety round/check completed   supervised activity   nonskid shoes/slippers when out of bed   increase visualization of patient   increased rounding and observation   clutter free environment maintained   bedside attendant  Taken 4/13/2024 2045 by Sudarshan Woods, RN  Safety Promotion/Fall Prevention:   activity supervised   assistive device/personal items within reach   room door open   room near nurse's station   safety round/check completed   supervised activity   nonskid shoes/slippers when out of bed   increase visualization of patient   increased rounding and observation   clutter free environment maintained   bedside attendant  Goal: Optimal Comfort and Wellbeing  Outcome: Progressing  Goal: Readiness for Transition of Care  Outcome: Progressing     Problem: Risk for Delirium  Goal: Optimal Coping  Outcome: Progressing  Goal: Improved Behavioral Control  Outcome: Progressing  Intervention: Minimize Safety Risk  Recent Flowsheet Documentation  Taken 4/14/2024 0000 by Sudarshan Woods, RN  Enhanced Safety Measures:  at bedside  Taken 4/13/2024 2045 by Sudarshan Woods RN  Enhanced Safety Measures:  at bedside  Goal: Improved Attention and Thought Clarity  Outcome: Progressing  Goal: Improved Sleep  Outcome: Progressing   Goal Outcome Evaluation:  VSS on RA, A&O to self only. Denies pain. 1:1 sitter maintained this shift fo rpt safety and fall risk. Agitated and restless @0100, given PRN seroqel, trazodone, and tylenol. Continues to have frequent trips to bathroom for urination. Will continue with plan of care.

## 2024-04-15 VITALS
TEMPERATURE: 98.7 F | WEIGHT: 126.54 LBS | DIASTOLIC BLOOD PRESSURE: 50 MMHG | HEIGHT: 62 IN | HEART RATE: 61 BPM | BODY MASS INDEX: 23.29 KG/M2 | OXYGEN SATURATION: 91 % | SYSTOLIC BLOOD PRESSURE: 97 MMHG | RESPIRATION RATE: 18 BRPM

## 2024-04-15 LAB
CREAT SERPL-MCNC: 0.73 MG/DL (ref 0.51–0.95)
EGFRCR SERPLBLD CKD-EPI 2021: 78 ML/MIN/1.73M2
PLATELET # BLD AUTO: 149 10E3/UL (ref 150–450)

## 2024-04-15 PROCEDURE — 250N000013 HC RX MED GY IP 250 OP 250 PS 637: Performed by: HOSPITALIST

## 2024-04-15 PROCEDURE — 82565 ASSAY OF CREATININE: CPT | Performed by: INTERNAL MEDICINE

## 2024-04-15 PROCEDURE — 85049 AUTOMATED PLATELET COUNT: CPT | Performed by: INTERNAL MEDICINE

## 2024-04-15 PROCEDURE — 99239 HOSP IP/OBS DSCHRG MGMT >30: CPT | Performed by: STUDENT IN AN ORGANIZED HEALTH CARE EDUCATION/TRAINING PROGRAM

## 2024-04-15 PROCEDURE — 250N000013 HC RX MED GY IP 250 OP 250 PS 637: Performed by: STUDENT IN AN ORGANIZED HEALTH CARE EDUCATION/TRAINING PROGRAM

## 2024-04-15 PROCEDURE — 36415 COLL VENOUS BLD VENIPUNCTURE: CPT | Performed by: INTERNAL MEDICINE

## 2024-04-15 RX ORDER — QUETIAPINE FUMARATE 25 MG/1
37.5 TABLET, FILM COATED ORAL EVERY EVENING
Qty: 60 TABLET | Refills: 0 | Status: SHIPPED | OUTPATIENT
Start: 2024-04-15

## 2024-04-15 RX ORDER — TRAZODONE HYDROCHLORIDE 50 MG/1
25 TABLET, FILM COATED ORAL
Qty: 30 TABLET | Refills: 0 | Status: SHIPPED | OUTPATIENT
Start: 2024-04-15

## 2024-04-15 RX ORDER — QUETIAPINE FUMARATE 25 MG/1
12.5 TABLET, FILM COATED ORAL DAILY PRN
Qty: 30 TABLET | Refills: 0 | Status: SHIPPED | OUTPATIENT
Start: 2024-04-15

## 2024-04-15 RX ADMIN — ACETAMINOPHEN 1000 MG: 500 TABLET ORAL at 08:04

## 2024-04-15 RX ADMIN — LEVOTHYROXINE SODIUM 100 MCG: 100 TABLET ORAL at 08:05

## 2024-04-15 ASSESSMENT — ACTIVITIES OF DAILY LIVING (ADL)
ADLS_ACUITY_SCORE: 45

## 2024-04-15 NOTE — PLAN OF CARE
Physical Therapy Discharge Summary    Reason for therapy discharge:    Discharged to home.    Progress towards therapy goal(s). See goals on Care Plan in [facility] electronic health record for goal details.  Goals not met.  Barriers to achieving goals:   discharge from facility.    Therapy recommendation(s):    Continued therapy is recommended.  Rationale/Recommendations:  Recommend continued PT however family declines.Home with 24 hour assist.

## 2024-04-15 NOTE — PROGRESS NOTES
Patient started shift as a 1:1 for fall/safety concerns. Daughter (Stephanie) arrived and shared that 1:1 is not needed when she is here. She reports helping her mom while walking and refused the use of the gait belt. Discussion regarding fall risks was had with daughter and she continued to refuse gait belt and reports that she will help patient. Staff 1:1 removed but available if needed.

## 2024-04-15 NOTE — PLAN OF CARE
Problem: Adult Inpatient Plan of Care  Goal: Readiness for Transition of Care  Outcome: Adequate for Care Transition   Goal Outcome Evaluation:  Patient oriented only to self.  Reports significant back pain and was provided PRN tylenol.  Daughter Stephanie at bedside and attentive to patient needs.  Discharged in stable condition with belongings.  Discharge instructions discussed with daughter Stephanie and questions answered.  Stephanie provided new prescription medications for patient prior to discharge.

## 2024-04-15 NOTE — PLAN OF CARE
Problem: Adult Inpatient Plan of Care  Goal: Plan of Care Review  Description: The Plan of Care Review/Shift note should be completed every shift.  The Outcome Evaluation is a brief statement about your assessment that the patient is improving, declining, or no change.  This information will be displayed automatically on your shift  note.  Outcome: Progressing     Problem: Adult Inpatient Plan of Care  Goal: Optimal Comfort and Wellbeing  Outcome: Progressing  Intervention: Provide Person-Centered Care  Recent Flowsheet Documentation  Taken 4/14/2024 1933 by Reilly Lilly RN  Trust Relationship/Rapport:   care explained   choices provided   emotional support provided     Problem: Risk for Delirium  Goal: Improved Behavioral Control  Intervention: Minimize Safety Risk  Recent Flowsheet Documentation  Taken 4/14/2024 1933 by Reilly Lilly RN  Communication Enhancement Strategies:   call light answered in person   extra time allowed for response   repetition utilized  Enhanced Safety Measures:  at bedside  Trust Relationship/Rapport:   care explained   choices provided   emotional support provided    Patient pleasantly confused this shift 1900 - 2300, denies pain. Up ambulating with the assist of 1, gait belt and walker. Remains on 1:1 for impulsive behavior.  Administered PRN melatonin and tylenol at bed time. Awaiting placement.     Reilly Lilly, GRACIA

## 2024-04-15 NOTE — DISCHARGE SUMMARY
[facility]  Hospitalist Discharge Summary      Date of Admission:  3/30/2024  Date of Discharge:  4/15/2024  Discharging Provider: Jess Chand MD  Discharge Service: Hospitalist Service    Discharge Diagnoses   Acute encephalopathy in the setting of likely undiagnosed dementia    Clinically Significant Risk Factors          Follow-ups Needed After Discharge   Follow-up Appointments     Follow-up and recommended labs and tests       Follow up with primary care provider, [facility],   within 7 days for hospital follow- up.  No follow up labs or test are   needed. Neurology follow up recommended- family to work with PCP.            Unresulted Labs Ordered in the Past 30 Days of this Admission       No orders found from 2/29/2024 to 3/31/2024.            Discharge Disposition   Discharged to home  Condition at discharge: Stable    Hospital Course   Mariela Ortiz is a 89 year old female admitted on 3/30/2024. She  presented to the ER with complaint of flulike symptoms for the past 3 weeks associated with cough, generalized weakness, poor appetite and oral intake, diarrhea, decreased mobility and confusion.    -- She was found to have possible UTI (completed antibiotic course) and positive COVID PCR however did not receive any treatment since she has not been hypoxic.    -- Her mental status has been fluctuating, suspect delirium in the setting of undiagnosed cognitive impairment/dementia.    -- 04/11: Back to one-to-one today.  Ongoing discussions with family regarding discharge plan (TCU versus home)   -- 04/12: pt was restless early last night but slept better later on. Currently off of 1:1 since this morning  -- 04/13: back on 1:1 due to risk of fall  Acute encephalopathy in the setting of likely undiagnosed dementia.    -- CT head shows moderate generalized volume loss with severe presumed chronic small vessel ischemic changes  -- Considered COVID recovered.   Completed treatment for UTI.  Mental status continues to fluctuate, suspect delirium.    -- Still needs one-to-one for safety.  Trazodone and melatonin at bedtime   -- 04/11: Scheduled seroquel in early evening increased to 37.5 mg  -- As needed Seroquel and olanzapine for agitation  -- 04/13: Pt was found sitting on the floor next to chair while was alone for 5 minutes per RN. On exam no significant findings. Pt denied pain and was able to move as before. No trauma to head. 1:1 for safety.  Possible UTI  -- Urine culture with >100K mixture of pathogens.  Completed antibiotic course.    -- Family requested repeat UA however unable to obtain.  Recovered COVID  -- Did not have any specific treatment as she was not hypoxic and onset of illness was probably a few weeks ago  Left leg swelling  -- Ultrasound with no evidence of DVT.  Diarrhea  -- Unable to obtain stool sample.  Could use Imodium as needed  CLL  -- Stable  Hypothyroidism   -- Continue PTA levothyroxine  Essential hypertension  --  Continue PTA lisinopril  Patient is clinically and hemodynamically stable for discharge. Medication reconciliation was done. Medications sent to pt's preferred pharmacy. Follow up appointments and recommendations as shown below. In discussion with family, they want to take her home and declined HC services for now. Pt has upcoming appointment and they would like to work with her PCP if the need arises. They will also will discuss with PCP for neurology referral. Daughter Ginger agreed to plan of care and verbalized understanding. All questions answered.    Consultations This Hospital Stay   CARE MANAGEMENT / SOCIAL WORK IP CONSULT  PHYSICAL THERAPY ADULT IP CONSULT  OCCUPATIONAL THERAPY ADULT IP CONSULT  CARE MANAGEMENT / SOCIAL WORK IP CONSULT  PHARMACY IP CONSULT  SOCIAL WORK IP CONSULT  PSYCHIATRY IP CONSULT  NEUROLOGY IP CONSULT  PALLIATIVE CARE ADULT IP CONSULT  SPIRITUAL HEALTH SERVICES IP CONSULT    Code Status   No CPR-  Do NOT Intubate    Time Spent on this Encounter   I, Jess Chand MD, personally saw the patient today and spent greater than 30 minutes discharging this patient.       Jess Chand MD  [address] [address]  Phone: [phone]  Fax: [phone]  ______________________________________________________________________    Physical Exam   Vital Signs: Temp: 98.7  F (37.1  C) Temp src: Oral BP: 97/50 Pulse: 78   Resp: 18 SpO2: 98 % O2 Device: None (Room air)    Weight: 126 lbs 8.7 oz  GEN: Alert. Not in acute distress.  HEENT: Atraumatic, mucous membrane- moist and pink.  Chest: Bilateral air entry.  CVS: S1S2 regular.   Abdomen: Soft. Non-tender, non-distended. No organomegaly. No guarding or rigidity. Bowel sounds active.   Extremities: No pedal edema.  CNS: No involuntary movements.  Skin: no cyanosis or clubbing.        Primary Care Physician   [facility]    Discharge Orders      Reason for your hospital stay    Altered mental status     Follow-up and recommended labs and tests     Follow up with primary care provider, [facility], within 7 days for hospital follow- up.  No follow up labs or test are needed. Neurology follow up recommended- family to work with PCP.     Activity    Your activity upon discharge: activity as tolerated with assist.     Diet    Follow this diet upon discharge: Orders Placed This Encounter      Snacks/Supplements Adult: Ensure Enlive; With Meals      Combination Diet Regular Diet Adult       Significant Results and Procedures       Discharge Medications   Current Discharge Medication List        START taking these medications    Details   !! QUEtiapine (SEROQUEL) 25 MG tablet Take 1.5 tablets (37.5 mg) by mouth every evening at 1700.  Qty: 60 tablet, Refills: 0    Associated Diagnoses: Agitation      !! QUEtiapine (SEROQUEL) 25 MG tablet Take 0.5 tablets (12.5 mg) by  mouth daily as needed (Agitation)  Qty: 30 tablet, Refills: 0    Associated Diagnoses: Agitation      traZODone (DESYREL) 50 MG tablet Take 0.5 tablets (25 mg) by mouth nightly as needed for sleep  Qty: 30 tablet, Refills: 0    Associated Diagnoses: Insomnia, unspecified type       !! - Potential duplicate medications found. Please discuss with provider.        CONTINUE these medications which have NOT CHANGED    Details   acetaminophen (TYLENOL) 500 MG tablet Take 1,000 mg by mouth every 6 hours as needed for pain      amoxicillin (AMOXIL) 500 MG capsule Take 500 mg by mouth 4 tablets 1 hour before dental.      guaiFENesin (ROBITUSSIN) 20 mg/mL liquid Take 200 mg by mouth every 4 hours as needed for cough      levothyroxine (SYNTHROID/LEVOTHROID) 100 MCG tablet Take 100 mcg by mouth daily      lisinopril (ZESTRIL) 5 MG tablet Take 5 mg by mouth daily      loperamide (IMODIUM) 2 MG capsule Take 2 mg by mouth daily as needed for diarrhea           STOP taking these medications       cyanocobalamin (VITAMIN B-12) 1000 MCG tablet Comments:   Reason for Stopping:             Allergies   Allergies   Allergen Reactions    Morphine Rash and Unknown     Red line went up her arm when given  Other reaction(s): Erythema  Red line went up her arm when given      Tramadol      agitation    Oxycodone-Acetaminophen Rash

## 2024-04-15 NOTE — PLAN OF CARE
Problem: Adult Inpatient Plan of Care  Goal: Plan of Care Review  Description: The Plan of Care Review/Shift note should be completed every shift.  The Outcome Evaluation is a brief statement about your assessment that the patient is improving, declining, or no change.  This information will be displayed automatically on your shift  note.  Outcome: Progressing     Problem: Confusion Acute  Goal: Optimal Cognitive Function  Outcome: Progressing  Intervention: Minimize Contributing Factors  Recent Flowsheet Documentation  Taken 4/15/2024 0208 by Laura King, RN  Sensory Stimulation Regulation: care clustered  Reorientation Measures:   clock in view   calendar in view  Communication Support Strategies:   active listening utilized   extra time allowed for response  Environment Familiarity/Consistency:   personal clothing/items utilized   daily routine followed   Goal Outcome Evaluation:         Pt alert and confused,agitated at start of shift,refused vitals and medication,up to bathroom frequently,1:1 observation continued for safety.

## 2024-04-15 NOTE — PROGRESS NOTES
Care Management Discharge Note    Discharge Date: 04/15/2024       Discharge Disposition:  home with 24/7 assist from family per daughter Stephanie.    Discharge Services:  none, daughter declines    Discharge DME:  none    Discharge Transportation: family or friend will provide    Private pay costs discussed: Not applicable    Does the patient's insurance plan have a 3 day qualifying hospital stay waiver?  No    PAS Confirmation Code:    Patient/family educated on Medicare website which has current facility and service quality ratings:      Education Provided on the Discharge Plan:    Persons Notified of Discharge Plans: yes  Patient/Family in Agreement with the Plan:      Handoff Referral Completed: Yes    Additional Information:  Pt medically ready to discharge. Per family they decline TCU and Home care set up. They plan to have 24/7 support in the home for the pt and to continue to pursue memory care at an assisted living facility and have been given resources on ALFs. No further CM needs at this time. CM will sing off.    Marcelina Johnston RN

## 2024-04-16 ENCOUNTER — PATIENT OUTREACH (OUTPATIENT)
Dept: CARE COORDINATION | Facility: CLINIC | Age: 89
End: 2024-04-16
Payer: COMMERCIAL

## 2024-04-16 NOTE — LETTER
[facility] CARE COORDINATION  [address]  [place] [address]    April 16, 2024    Mariela Ortiz  [address] [facility] [address]      Dear Mraiela,    I am a clinic care coordinator who works with [facility] with the [facility]. I wanted to introduce myself and provide you with my contact information for you to be able to call me with any questions or concerns. Below is a description of clinic care coordination and how I can further assist you.       The clinic care coordination team is made up of a registered nurse, , financial resource worker and community health worker who understand the health care system. The goal of clinic care coordination is to help you manage your health and improve access to the health care system. Our team works alongside your provider to assist you in determining your health and social needs. We can help you obtain health care and community resources, providing you with necessary information and education. We can work with you through any barriers and develop a care plan that helps coordinate and strengthen the communication between you and your care team.  Our services are voluntary and are offered without charge to you personally.    Please feel free to contact me with any questions or concerns regarding care coordination and what we can offer.      We are focused on providing you with the highest-quality healthcare experience possible.    Sincerely,     Josh You MSN, RN, PHN, [facility]   Primary Care Clinical RN Care Coordinator  [facility]  4/16/2024   10:49 AM  [email or web address]  Office: [phone]

## 2024-04-16 NOTE — PROGRESS NOTES
Clinic Care Coordination Contact  [facility]/Voicemail    Clinical Data: Care Coordinator Outreach    Outreach Documentation Number of Outreach Attempt   4/16/2024  10:50 AM 1       Left message on patient's voicemail with call back information and requested return call.    Plan: Care Coordinator will send care coordination introduction letter with care coordinator contact information and explanation of care coordination services via mail. Care Coordinator will try to reach patient again in 1-2 business days.    Josh You MSN, RN, PHN, CCM   Primary Care Clinical RN Care Coordinator  [facility]  4/16/2024   10:50 AM  [email or web address]  Office: [phone]

## 2024-04-19 ENCOUNTER — PATIENT OUTREACH (OUTPATIENT)
Dept: CARE COORDINATION | Facility: CLINIC | Age: 89
End: 2024-04-19
Payer: COMMERCIAL

## 2024-04-19 NOTE — PROGRESS NOTES
Clinic Care Coordination Contact  [facility]/Voicemail    Clinical Data: Care Coordinator Outreach    Outreach Documentation Number of Outreach Attempt   4/16/2024  10:50 AM 1   4/19/2024   8:45 AM 2       Unable to leave a message on patient's voicemail with call back information and requested return call. The voicemail is full.    Plan: Care Coordinator sent care coordination introduction letter on 4/16/24 via [employer]. Care Coordinator will do no further outreaches at this time.    Josh You MSN, RN, PHN, [facility]   Primary Care Clinical RN Care Coordinator  [facility]  4/19/2024   8:46 AM  [email or web address]  Office: [phone]

## 2024-08-03 ENCOUNTER — HEALTH MAINTENANCE LETTER (OUTPATIENT)
Age: 89
End: 2024-08-03

## 2024-10-12 ENCOUNTER — HEALTH MAINTENANCE LETTER (OUTPATIENT)
Age: 89
End: 2024-10-12

## 2024-12-03 ENCOUNTER — HOSPITAL ENCOUNTER (INPATIENT)
Facility: HOSPITAL | Age: 89
DRG: 064 | End: 2024-12-03
Attending: EMERGENCY MEDICINE | Admitting: INTERNAL MEDICINE
Payer: COMMERCIAL

## 2024-12-03 DIAGNOSIS — G93.40 ACUTE ENCEPHALOPATHY: ICD-10-CM

## 2024-12-03 DIAGNOSIS — R41.82 ALTERED MENTAL STATUS, UNSPECIFIED ALTERED MENTAL STATUS TYPE: ICD-10-CM

## 2024-12-03 LAB
ALBUMIN SERPL BCG-MCNC: 4.1 G/DL (ref 3.5–5.2)
ALBUMIN UR-MCNC: NEGATIVE MG/DL
ALP SERPL-CCNC: 99 U/L (ref 40–150)
ALT SERPL W P-5'-P-CCNC: 26 U/L (ref 0–50)
ANION GAP SERPL CALCULATED.3IONS-SCNC: 8 MMOL/L (ref 7–15)
APPEARANCE UR: CLEAR
APTT PPP: 26 SECONDS (ref 22–38)
AST SERPL W P-5'-P-CCNC: 17 U/L (ref 0–45)
BASOPHILS # BLD AUTO: 0.1 10E3/UL (ref 0–0.2)
BASOPHILS NFR BLD AUTO: 0 %
BILIRUB DIRECT SERPL-MCNC: <0.2 MG/DL (ref 0–0.3)
BILIRUB SERPL-MCNC: 0.5 MG/DL
BILIRUB UR QL STRIP: NEGATIVE
BUN SERPL-MCNC: 6.3 MG/DL (ref 8–23)
CALCIUM SERPL-MCNC: 8.9 MG/DL (ref 8.8–10.4)
CHLORIDE SERPL-SCNC: 106 MMOL/L (ref 98–107)
COLOR UR AUTO: ABNORMAL
CREAT SERPL-MCNC: 0.75 MG/DL (ref 0.51–0.95)
EGFRCR SERPLBLD CKD-EPI 2021: 75 ML/MIN/1.73M2
EOSINOPHIL # BLD AUTO: 0 10E3/UL (ref 0–0.7)
EOSINOPHIL NFR BLD AUTO: 0 %
ERYTHROCYTE [DISTWIDTH] IN BLOOD BY AUTOMATED COUNT: 13.3 % (ref 10–15)
GLUCOSE SERPL-MCNC: 128 MG/DL (ref 70–99)
GLUCOSE UR STRIP-MCNC: NEGATIVE MG/DL
HCO3 SERPL-SCNC: 24 MMOL/L (ref 22–29)
HCT VFR BLD AUTO: 38.9 % (ref 35–47)
HGB BLD-MCNC: 12.5 G/DL (ref 11.7–15.7)
HGB UR QL STRIP: ABNORMAL
HOLD SPECIMEN: NORMAL
IMM GRANULOCYTES # BLD: 0 10E3/UL
IMM GRANULOCYTES NFR BLD: 0 %
INR PPP: 0.98 (ref 0.85–1.15)
KETONES UR STRIP-MCNC: NEGATIVE MG/DL
LEUKOCYTE ESTERASE UR QL STRIP: NEGATIVE
LYMPHOCYTES # BLD AUTO: 12.1 10E3/UL (ref 0.8–5.3)
LYMPHOCYTES NFR BLD AUTO: 73 %
MAGNESIUM SERPL-MCNC: 2.1 MG/DL (ref 1.7–2.3)
MCH RBC QN AUTO: 31.4 PG (ref 26.5–33)
MCHC RBC AUTO-ENTMCNC: 32.1 G/DL (ref 31.5–36.5)
MCV RBC AUTO: 98 FL (ref 78–100)
MONOCYTES # BLD AUTO: 0.5 10E3/UL (ref 0–1.3)
MONOCYTES NFR BLD AUTO: 3 %
MUCOUS THREADS #/AREA URNS LPF: PRESENT /LPF
NEUTROPHILS # BLD AUTO: 3.8 10E3/UL (ref 1.6–8.3)
NEUTROPHILS NFR BLD AUTO: 23 %
NITRATE UR QL: NEGATIVE
NRBC # BLD AUTO: 0 10E3/UL
NRBC BLD AUTO-RTO: 0 /100
PH UR STRIP: 5.5 [PH] (ref 5–7)
PLAT MORPH BLD: ABNORMAL
PLATELET # BLD AUTO: 159 10E3/UL (ref 150–450)
POTASSIUM SERPL-SCNC: 4 MMOL/L (ref 3.4–5.3)
PROT SERPL-MCNC: 6.1 G/DL (ref 6.4–8.3)
RBC # BLD AUTO: 3.98 10E6/UL (ref 3.8–5.2)
RBC MORPH BLD: ABNORMAL
RBC URINE: 2 /HPF
SMUDGE CELLS BLD QL SMEAR: PRESENT
SODIUM SERPL-SCNC: 138 MMOL/L (ref 135–145)
SP GR UR STRIP: 1.03 (ref 1–1.03)
TROPONIN T SERPL HS-MCNC: 16 NG/L
TROPONIN T SERPL HS-MCNC: 20 NG/L
TSH SERPL DL<=0.005 MIU/L-ACNC: 0.97 UIU/ML (ref 0.3–4.2)
UROBILINOGEN UR STRIP-MCNC: <2 MG/DL
WBC # BLD AUTO: 16.5 10E3/UL (ref 4–11)
WBC URINE: 1 /HPF

## 2024-12-03 PROCEDURE — 82248 BILIRUBIN DIRECT: CPT | Performed by: STUDENT IN AN ORGANIZED HEALTH CARE EDUCATION/TRAINING PROGRAM

## 2024-12-03 PROCEDURE — 36415 COLL VENOUS BLD VENIPUNCTURE: CPT | Performed by: STUDENT IN AN ORGANIZED HEALTH CARE EDUCATION/TRAINING PROGRAM

## 2024-12-03 PROCEDURE — 99285 EMERGENCY DEPT VISIT HI MDM: CPT | Mod: 25

## 2024-12-03 PROCEDURE — 84484 ASSAY OF TROPONIN QUANT: CPT | Performed by: STUDENT IN AN ORGANIZED HEALTH CARE EDUCATION/TRAINING PROGRAM

## 2024-12-03 PROCEDURE — 85018 HEMOGLOBIN: CPT | Performed by: EMERGENCY MEDICINE

## 2024-12-03 PROCEDURE — 250N000011 HC RX IP 250 OP 636: Performed by: INTERNAL MEDICINE

## 2024-12-03 PROCEDURE — 84443 ASSAY THYROID STIM HORMONE: CPT | Performed by: INTERNAL MEDICINE

## 2024-12-03 PROCEDURE — 93005 ELECTROCARDIOGRAM TRACING: CPT | Performed by: EMERGENCY MEDICINE

## 2024-12-03 PROCEDURE — 82040 ASSAY OF SERUM ALBUMIN: CPT | Performed by: STUDENT IN AN ORGANIZED HEALTH CARE EDUCATION/TRAINING PROGRAM

## 2024-12-03 PROCEDURE — 250N000013 HC RX MED GY IP 250 OP 250 PS 637: Performed by: EMERGENCY MEDICINE

## 2024-12-03 PROCEDURE — 250N000013 HC RX MED GY IP 250 OP 250 PS 637: Performed by: INTERNAL MEDICINE

## 2024-12-03 PROCEDURE — 258N000003 HC RX IP 258 OP 636: Performed by: STUDENT IN AN ORGANIZED HEALTH CARE EDUCATION/TRAINING PROGRAM

## 2024-12-03 PROCEDURE — 83735 ASSAY OF MAGNESIUM: CPT | Performed by: STUDENT IN AN ORGANIZED HEALTH CARE EDUCATION/TRAINING PROGRAM

## 2024-12-03 PROCEDURE — 96376 TX/PRO/DX INJ SAME DRUG ADON: CPT

## 2024-12-03 PROCEDURE — 120N000001 HC R&B MED SURG/OB

## 2024-12-03 PROCEDURE — 84484 ASSAY OF TROPONIN QUANT: CPT | Performed by: EMERGENCY MEDICINE

## 2024-12-03 PROCEDURE — 85004 AUTOMATED DIFF WBC COUNT: CPT | Performed by: EMERGENCY MEDICINE

## 2024-12-03 PROCEDURE — 80053 COMPREHEN METABOLIC PANEL: CPT | Performed by: EMERGENCY MEDICINE

## 2024-12-03 PROCEDURE — 96372 THER/PROPH/DIAG INJ SC/IM: CPT | Performed by: STUDENT IN AN ORGANIZED HEALTH CARE EDUCATION/TRAINING PROGRAM

## 2024-12-03 PROCEDURE — 250N000011 HC RX IP 250 OP 636: Performed by: EMERGENCY MEDICINE

## 2024-12-03 PROCEDURE — 250N000011 HC RX IP 250 OP 636: Mod: JW | Performed by: STUDENT IN AN ORGANIZED HEALTH CARE EDUCATION/TRAINING PROGRAM

## 2024-12-03 PROCEDURE — 81003 URINALYSIS AUTO W/O SCOPE: CPT | Performed by: EMERGENCY MEDICINE

## 2024-12-03 PROCEDURE — 82565 ASSAY OF CREATININE: CPT | Performed by: EMERGENCY MEDICINE

## 2024-12-03 PROCEDURE — 36415 COLL VENOUS BLD VENIPUNCTURE: CPT | Performed by: EMERGENCY MEDICINE

## 2024-12-03 PROCEDURE — 96374 THER/PROPH/DIAG INJ IV PUSH: CPT

## 2024-12-03 PROCEDURE — G0508 CRIT CARE TELEHEA CONSULT 60: HCPCS | Mod: G0 | Performed by: NURSE PRACTITIONER

## 2024-12-03 PROCEDURE — 99222 1ST HOSP IP/OBS MODERATE 55: CPT | Performed by: INTERNAL MEDICINE

## 2024-12-03 PROCEDURE — 85610 PROTHROMBIN TIME: CPT | Performed by: EMERGENCY MEDICINE

## 2024-12-03 PROCEDURE — 80051 ELECTROLYTE PANEL: CPT | Performed by: EMERGENCY MEDICINE

## 2024-12-03 PROCEDURE — 85730 THROMBOPLASTIN TIME PARTIAL: CPT | Performed by: EMERGENCY MEDICINE

## 2024-12-03 RX ORDER — OLANZAPINE 10 MG/2ML
2.5 INJECTION, POWDER, FOR SOLUTION INTRAMUSCULAR ONCE
Status: COMPLETED | OUTPATIENT
Start: 2024-12-03 | End: 2024-12-03

## 2024-12-03 RX ORDER — LEVOTHYROXINE SODIUM 100 UG/1
100 TABLET ORAL
Status: DISCONTINUED | OUTPATIENT
Start: 2024-12-04 | End: 2024-12-06

## 2024-12-03 RX ORDER — ASPIRIN 300 MG/1
300 SUPPOSITORY RECTAL ONCE
Status: COMPLETED | OUTPATIENT
Start: 2024-12-03 | End: 2024-12-03

## 2024-12-03 RX ORDER — CALCIUM CARBONATE 500 MG/1
1000 TABLET, CHEWABLE ORAL 4 TIMES DAILY PRN
Status: DISCONTINUED | OUTPATIENT
Start: 2024-12-03 | End: 2024-12-06 | Stop reason: HOSPADM

## 2024-12-03 RX ORDER — ENOXAPARIN SODIUM 100 MG/ML
40 INJECTION SUBCUTANEOUS EVERY 24 HOURS
Status: DISCONTINUED | OUTPATIENT
Start: 2024-12-03 | End: 2024-12-06

## 2024-12-03 RX ORDER — AMOXICILLIN 500 MG/1
4 TABLET, FILM COATED ORAL ONCE
COMMUNITY
Start: 2024-10-22

## 2024-12-03 RX ORDER — LISINOPRIL 5 MG/1
5 TABLET ORAL EVERY MORNING
Status: DISCONTINUED | OUTPATIENT
Start: 2024-12-04 | End: 2024-12-06

## 2024-12-03 RX ORDER — QUETIAPINE FUMARATE 25 MG/1
25 TABLET, FILM COATED ORAL EVERY 8 HOURS PRN
Status: DISCONTINUED | OUTPATIENT
Start: 2024-12-03 | End: 2024-12-06

## 2024-12-03 RX ORDER — LORAZEPAM 2 MG/ML
0.5 INJECTION INTRAMUSCULAR ONCE
Status: COMPLETED | OUTPATIENT
Start: 2024-12-03 | End: 2024-12-03

## 2024-12-03 RX ORDER — AMOXICILLIN 250 MG
1 CAPSULE ORAL 2 TIMES DAILY PRN
Status: DISCONTINUED | OUTPATIENT
Start: 2024-12-03 | End: 2024-12-06

## 2024-12-03 RX ORDER — SODIUM CHLORIDE, SODIUM LACTATE, POTASSIUM CHLORIDE, CALCIUM CHLORIDE 600; 310; 30; 20 MG/100ML; MG/100ML; MG/100ML; MG/100ML
INJECTION, SOLUTION INTRAVENOUS CONTINUOUS
Status: DISCONTINUED | OUTPATIENT
Start: 2024-12-03 | End: 2024-12-03

## 2024-12-03 RX ORDER — ACETAMINOPHEN 325 MG/1
650 TABLET ORAL EVERY 4 HOURS PRN
Status: DISCONTINUED | OUTPATIENT
Start: 2024-12-03 | End: 2024-12-06 | Stop reason: HOSPADM

## 2024-12-03 RX ORDER — OLANZAPINE 10 MG/2ML
2.5 INJECTION, POWDER, FOR SOLUTION INTRAMUSCULAR DAILY PRN
Status: DISCONTINUED | OUTPATIENT
Start: 2024-12-03 | End: 2024-12-06

## 2024-12-03 RX ORDER — ACETAMINOPHEN 650 MG/1
650 SUPPOSITORY RECTAL EVERY 4 HOURS PRN
Status: DISCONTINUED | OUTPATIENT
Start: 2024-12-03 | End: 2024-12-06 | Stop reason: HOSPADM

## 2024-12-03 RX ORDER — HYDRALAZINE HYDROCHLORIDE 20 MG/ML
10 INJECTION INTRAMUSCULAR; INTRAVENOUS EVERY 6 HOURS PRN
Status: DISCONTINUED | OUTPATIENT
Start: 2024-12-03 | End: 2024-12-04

## 2024-12-03 RX ORDER — LIDOCAINE 40 MG/G
CREAM TOPICAL
Status: DISCONTINUED | OUTPATIENT
Start: 2024-12-03 | End: 2024-12-06 | Stop reason: HOSPADM

## 2024-12-03 RX ORDER — IOPAMIDOL 755 MG/ML
67 INJECTION, SOLUTION INTRAVASCULAR ONCE
Status: COMPLETED | OUTPATIENT
Start: 2024-12-03 | End: 2024-12-03

## 2024-12-03 RX ORDER — AMOXICILLIN 250 MG
2 CAPSULE ORAL 2 TIMES DAILY PRN
Status: DISCONTINUED | OUTPATIENT
Start: 2024-12-03 | End: 2024-12-06

## 2024-12-03 RX ORDER — SODIUM CHLORIDE, SODIUM LACTATE, POTASSIUM CHLORIDE, CALCIUM CHLORIDE 600; 310; 30; 20 MG/100ML; MG/100ML; MG/100ML; MG/100ML
INJECTION, SOLUTION INTRAVENOUS CONTINUOUS
Status: ACTIVE | OUTPATIENT
Start: 2024-12-03 | End: 2024-12-04

## 2024-12-03 RX ADMIN — SODIUM CHLORIDE, POTASSIUM CHLORIDE, SODIUM LACTATE AND CALCIUM CHLORIDE: 600; 310; 30; 20 INJECTION, SOLUTION INTRAVENOUS at 18:31

## 2024-12-03 RX ADMIN — ENOXAPARIN SODIUM 40 MG: 40 INJECTION SUBCUTANEOUS at 21:32

## 2024-12-03 RX ADMIN — Medication 3 MG: at 22:39

## 2024-12-03 RX ADMIN — QUETIAPINE FUMARATE 25 MG: 25 TABLET ORAL at 21:06

## 2024-12-03 RX ADMIN — LORAZEPAM 0.5 MG: 2 INJECTION INTRAMUSCULAR; INTRAVENOUS at 13:36

## 2024-12-03 RX ADMIN — HYDRALAZINE HYDROCHLORIDE 10 MG: 20 INJECTION INTRAMUSCULAR; INTRAVENOUS at 21:26

## 2024-12-03 RX ADMIN — LORAZEPAM 0.5 MG: 2 INJECTION INTRAMUSCULAR; INTRAVENOUS at 13:19

## 2024-12-03 RX ADMIN — ASPIRIN 300 MG: 300 SUPPOSITORY RECTAL at 15:06

## 2024-12-03 RX ADMIN — LORAZEPAM 0.5 MG: 2 INJECTION INTRAMUSCULAR; INTRAVENOUS at 14:15

## 2024-12-03 RX ADMIN — OLANZAPINE 2.5 MG: 10 INJECTION, POWDER, FOR SOLUTION INTRAMUSCULAR at 16:12

## 2024-12-03 RX ADMIN — IOPAMIDOL 67 ML: 755 INJECTION, SOLUTION INTRAVENOUS at 12:38

## 2024-12-03 ASSESSMENT — ACTIVITIES OF DAILY LIVING (ADL)
ADLS_ACUITY_SCORE: 63
ADLS_ACUITY_SCORE: 67
ADLS_ACUITY_SCORE: 63
ADLS_ACUITY_SCORE: 63
ADLS_ACUITY_SCORE: 64
ADLS_ACUITY_SCORE: 63

## 2024-12-03 NOTE — H&P
[facility]    History and Physical - Hospitalist Service       Date of Admission:  12/3/2024    Assessment & Plan      Mariela Ortiz is a 89 year old female presented on 12/3/2024 for altered mental status.     Altered mental status:  Her last known well was the day before at 5 PM. She found confused when woke up the next morning at 10 AM.   CT and MRI head showed no acute intracranial process, generalized brain atrophy and presumed microvascular ischemic changes.  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   No signs of infection. Chest XR negative. UA shows no UTI. Has mild leukocytosis due to her CLL.   The etiology for her AMS is not clear at this time.   Per daughter, patient has baseline cognitive impairment and sundowning.  Per chart review, she had prolonged encephalopathy when being admitted for UTI and COVID in March 2024, concerning undiagnosed dementia. Per daughters, her encephalopathy gradually improved after returning home.   - Telemetry  - Neuro checks q4h  - Supportive care with melatonin at bedtime, seroquel prn  - Family concerns patient is dehydrated. Will given gentle IVF.  - If mental status persist tomorrow, may consider to repeat brain imaging.     Mildly elevated troponin:  Troponin borderline elevated at 16  - Telemetry  - Continue to monitor troponin    CLL: Mild leukocytosis at baseline.     Essential hypertension: Continue PTA lisinopril    Hypothyroidism: TSH is within normal range. Continue PTA levothyroxine          Diet: Regular diet  DVT Prophylaxis: Enoxaparin (Lovenox) SQ  Zaman Catheter: Not present  Lines: None     Cardiac Monitoring: None  Code Status:  DNR/DNI. Per discussion with her daughters.     Clinically Significant Risk Factors Present on Admission                   # Hypertension: Noted on problem list     #  Dementia: noted on problem list                  Disposition Plan     Medically Ready for Discharge: Anticipated in 2-4 Days           Jenelle oH MD  Hospitalist Service  [facility]  Securely message with [employer] (more info)  Text page via [employer] Paging/Directory     ______________________________________________________________________    Chief Complaint   Altered mental status     History is obtained from the patient and her daughter    History of Present Illness   Mariela Ortiz is a 89 year old female with history of hypertension, hypothyroidism and CLL, who presents to the ER for altered mental status. Family last saw her at around 5 PM last night. She then went to bed. When her daughters visited her around 10 AM today, they found her confused and not able to interact. At her baseline, she is able to make needs known and able to communicate with family.   In ER, CT head and MRI head all showed no acute intracranial abnormalities. Lab tests are unremarkable. UA shows no UTI.       Past Medical History    Past Medical History:   Diagnosis Date    Melanoma (H)        Past Surgical History   Past Surgical History:   Procedure Laterality Date    APPENDECTOMY      CHOLECYSTECTOMY, LAPOROSCOPIC      Cholecystectomy, Laparoscopic    HYSTERECTOMY, PAP NO LONGER INDICATED  1981    menorrhagia    JOINT REPLACEMENT, HIP RT/LT  fall 2002    right    JOINT REPLACEMTN, KNEE RT/LT  2002    Joint Replacement knee RT/LT       Prior to Admission Medications   Prior to Admission Medications   Prescriptions Last Dose Informant Patient Reported? Taking?   amoxicillin (AMOXIL) 500 MG tablet   Yes Yes   Sig: Take 4 tablets by mouth once. 1 hour prior to dental appointment   levothyroxine (SYNTHROID/LEVOTHROID) 100 MCG tablet 12/3/2024 Morning  Yes Yes   Sig: Take 100 mcg by mouth every morning.   lisinopril (ZESTRIL) 5 MG tablet 12/3/2024 Morning  Yes Yes   Sig: Take 5 mg by mouth every morning.    loperamide (IMODIUM) 2 MG capsule   Yes Yes   Sig: Take 2 mg by mouth daily as needed for diarrhea   vitamin B-12 (CYANOCOBALAMIN) 1000 MCG tablet 12/3/2024 Morning  Yes Yes   Sig: Take 1 tablet by mouth every morning.      Facility-Administered Medications: None        Review of Systems    The 10 point Review of Systems is negative other than noted in the HPI or here.      Physical Exam   Vital Signs:     BP: (!) 172/72 Pulse: 85   Resp: 29 SpO2: 98 %      Weight: 118 lbs 12.8 oz    General appearance: not in acute distress  HEENT: PERRL, EOMI  Lungs: Clear breath sounds in bilateral lung fields  Cardiovascular: Regular rate and rhythm, normal S1-S2  Abdomen: Soft, non tender, no distension  Musculoskeletal: No joint swelling  Skin: No rash and no edema  Neurology: Awake and alert, confused.  Cranial nerves II - XII normal.  Normal muscle strength in all four extremities.     Medical Decision Making       70 MINUTES SPENT BY ME on the date of service doing chart review, history, exam, documentation & further activities per the note.      Data     I have personally reviewed the following data over the past 24 hrs:    16.5 (H)  \   12.5   / 159     138 106 6.3 (L) /  128 (H)   4.0 24 0.75 \     ALT: 26 AST: 17 AP: 99 TBILI: 0.5   ALB: 4.1 TOT PROTEIN: 6.1 (L) LIPASE: N/A     Trop: 20 (H) BNP: N/A     INR:  0.98 PTT:  26   D-dimer:  N/A Fibrinogen:  N/A       Imaging results reviewed over the past 24 hrs:   Recent Results (from the past 24 hours)   CTA Head Neck with Contrast    Narrative    EXAM: CTA HEAD NECK W CONTRAST  LOCATION: [facility]  DATE: 12/3/2024    INDICATION: Slurred speech, right facial droop, left arm weakness, agitation. Hyperglycemia. Strokelike symptoms.  COMPARISON: CT brain April 4, 2024.  CONTRAST: [id number]  TECHNIQUE: Head and neck CT angiogram with IV contrast. Noncontrast head CT followed by axial helical CT images of the head and neck vessels obtained during  the arterial phase of intravenous contrast administration. Axial 2D reconstructed images and   multiplanar 3D MIP reconstructed images of the head and neck vessels were performed by the technologist. Dose reduction techniques were used. All stenosis measurements made according to NASCET criteria unless otherwise specified.    FINDINGS:   NONCONTRAST HEAD CT:   INTRACRANIAL CONTENTS: No intracranial hemorrhage, extraaxial collection, or mass effect.  No CT evidence of acute infarct. Moderate presumed chronic small vessel ischemic changes. Moderate generalized volume loss. No hydrocephalus. Skull base vascular   calcifications. No evidence for loss of gray matter/white matter differentiation to indicate acute transcortical infarct.     VISUALIZED ORBITS/SINUSES/MASTOIDS: Prior bilateral cataract surgery. Visualized portions of the orbits are otherwise unremarkable. No paranasal sinus mucosal disease. No middle ear or mastoid effusion. Moderate TMJ arthropathy.    BONES/SOFT TISSUES: No acute abnormality.    HEAD CTA:  ANTERIOR CIRCULATION: Carotid siphon and supraclinoid vascular calcifications with moderate stenoses bilaterally. Mild stenosis of the distal M1 segment of the right middle cerebral artery.    Severe, focal stenosis of the mid M1 segment of the left middle cerebral artery. Moderate moderate stenoses of the distal left MCA M1 segment and proximal superior division M2 segment. Mild stenoses of the A3 segment of the right anterior cerebral   artery. There is also mild focal stenosis of the right anterior cerebral artery A4 branch segment. Vascular supply to the posterior cerebral arteries from the posterior communicating arteries.     POSTERIOR CIRCULATION: Left vertebral artery terminates in the left PICA. Mild stenosis of the distal right vertebral artery V4 segment. Occlusion of the mid to distal basilar artery is present. Indeterminate age but may be chronic since there is fairly   good collateral flow  "to the distal posterior circulation vessels from the posterior communicating arteries. Consider MRI brain with diffusion if there are signs or symptoms of posterior circulation ischemia. No evidence for discrete intraluminal filling   defect or vascular hyperdensity on the conventional CT.    High-grade stenosis of the proximal right posterior cerebral artery. Distal right PCA branch occlusion is present without evidence for definite discrete filling defect. May be chronic. Moderate stenosis of the P2 segment of the left posterior cerebral   artery. Distal left PCA branches unremarkable.    DURAL VENOUS SINUSES: Expected enhancement of the major dural venous sinuses.    NECK CTA:  RIGHT CAROTID: 70% stenosis at the right ICA origin. Heavily calcified plaque. No dissection.    LEFT CAROTID: 20% stenosis at the left ICA origin. Calcified plaque. No dissection.    VERTEBRAL ARTERIES: No focal stenosis or dissection. Balanced vertebral arteries.    AORTIC ARCH: Vascular variant aortic arch origin left vertebral artery.  No significant stenosis at the origin of the great vessels. Aberrant right subclavian artery with mild-to-moderate stenosis of the proximal portion. Moderate to high-grade stenosis   of the left subclavian artery origin.    NONVASCULAR STRUCTURES: Moderate cervical spondylosis.      Impression    IMPRESSION:   HEAD CT:  1.  No evidence for acute infarct, mass or hemorrhage. Atrophy and chronic vascular changes. No \"dense vessel\" sign to strongly suggest acute intraluminal thrombus    HEAD CTA:   1.  Extensive, multifocal intracranial stenoses which are likely atherosclerotic.    2.  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 Correlate with any signs or symptoms of posterior fossa ischemia and consider further evaluation with MRI with diffusion. No definite acute intraluminal thrombus/meniscus sign or dense   vessel on the conventional CT.    3.  Probable chronic distal right PCA " branch occlusion. Correlate clinically.    4.  Findings discussed with Dr. Carter in the emergency department on 12/3/2024 1243 PM CST    NECK CTA:  1.  High-grade stenosis at right ICA origin    2.  Mild stenosis at left ICA origin.    3.   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   MR Brain w/o Contrast    Narrative    EXAM: MR BRAIN W/O CONTRAST  LOCATION: [facility]  DATE: 12/3/2024    INDICATION: right sided facial weakness, aphasia, dysarthria  COMPARISON: CT brain 3 December 2024.  TECHNIQUE: Head MRI without IV contrast performed according to the quick brain protocol with rapid imaging sequences.    FINDINGS:  INTRACRANIAL CONTENTS: No acute or subacute infarct. No mass, acute hemorrhage, or extra-axial fluid collections. Patchy and confluent nonspecific T2/FLAIR hyperintensities within the cerebral white matter most consistent with moderate chronic   microvascular ischemic change. Moderate generalized cerebral atrophy. No hydrocephalus. Normal position of the cerebellar tonsils. Motion artifact obscures some anatomic detail on the diffusion-weighted imaging sequences. Apparent increased DWI signal   within the brainstem is present symmetrically and compatible with normal diffusion anisotropy.    OTHER: Accounting for technique no additional abnormalities identified.      Impression    IMPRESSION:  1.  No acute intracranial process.  2.  Generalized brain atrophy and presumed microvascular ischemic changes as detailed above.   XR Chest Port 1 View    Narrative    EXAM: XR CHEST PORT 1 VIEW  LOCATION: [facility]  DATE: 12/3/2024    INDICATION: AMS  COMPARISON: 3/30/2024      Impression    IMPRESSION: Negative chest.

## 2024-12-03 NOTE — ED NOTES
Bed: JN-22  Expected date:   Expected time:   Means of arrival: Ambulance  Comments:  [facility]: Confused, Slurred speech

## 2024-12-03 NOTE — ED PROVIDER NOTES
EMERGENCY DEPARTMENT ENCOUNTER      NAME: Mariela Ortiz  AGE: 89 year old female  YOB: 1933  MRN: [id number]  EVALUATION DATE & TIME: 12/3/2024 12:24 PM    PCP: ROMAN Allen [facility]    ED PROVIDER: Willie Carter D.O.      Chief Complaint   Patient presents with    Stroke Symptoms       FINAL IMPRESSION:  1. Altered mental status, unspecified altered mental status type        ED COURSE & MEDICAL DECISION MAKIN:20 PM I met with the patient to gather history and to perform my initial exam. I discussed the plan for care while in the Emergency Department.  12:21 PM Tier 1 stroke code called. Patient sent for emergent CT scan.  12:25 PM I spoke with Stroke Neurology.   12:52 PM I spoke with [facility]. CTA showed chronic basilar artery occlusion. Other chronic atherosclerotic disease visualized on CT.  2:25 PM discussed patient again with neurology.  They do not see any obvious evidence of stroke, and stroke code has been de-escalated.  They recommended evaluation for toxic metabolic cause and will likely repeat MRI inpatient.  Also recommended rectal aspirin.           Pertinent Labs & Imaging studies reviewed. (See chart for details)  89 year old female presents to the Emergency Department for evaluation of right-sided facial droop and weakness.  Was noted at 10:30 AM today but potential last known well was 5:30 PM yesterday.  Because of initial presentation and initially believing that the patient's last known well was at 10:30 AM today, tier 1 stroke code was called.  I discussed the patient with stroke neurology.  Patient taken emergently to CT, and there was obvious atherosclerotic disease but no evidence of large vessel occlusion.  Therefore the patient was taken for hyperacute MRI based on recommendation from neurology.  Neurology does not see any obvious evidence of stroke, and therefore recommended evaluation for toxic metabolic causes of altered mental status.   "Therefore at this time, we are obtaining additional labs, and plan for admission.  Patient care turned over to Dr. Riley pending results of labs.    Medical Decision Making  Obtained supplemental history:Supplemental history obtained?: Family Member/Significant Other and EMS  Reviewed external records: External records reviewed?: No  Care impacted by chronic illness:Dementia, Diabetes, and Hyperlipidemia  Did you consider but not order tests?: Work up considered but not performed and documented in chart, if applicable  Did you interpret images independently?: Independent interpretation of ECG and images noted in documentation, when applicable.  Consultation discussion with other provider:Did you involve another provider (consultant, , pharmacy, etc.)?: No  Admission considered. Patient was signed out to the oncoming physician, disposition pending.    MIPS: Not Applicable        At the conclusion of the encounter I discussed the results of all of the tests and the disposition. The questions were answered. The patient or family acknowledged understanding and was agreeable with the care plan.          HPI    Patient information was obtained from: patient, EMS, and daughter    Use of : N/A      Mariela Ortiz is a 89 year old female who presents with stroke symptoms.     HPI limited due to AMS.    Per EMS,  The patient arrives via EMS from home where she lives with daughter. Her daughter noticed slurred speech and was \"acting different\" when she arrived at 10:30 AM (2 hours prior to arrival). However, the patient was last seen normal at 5:30 PM. When EMS arrived, the patient had confusion, right facial droop, would not give name or date of birth, and seemed agitated with the situation. En route, EMS noticed slight right arm and right  weakness. On arrival to the ED the patient is not following most commands and has more pronounced right arm and right leg weakness. Blood pressure was 160/--. During " "neuro exam here she states \"I can't\" when instructed to hold leg up against gravity.       PAST MEDICAL HISTORY:  Past Medical History:   Diagnosis Date    Melanoma (H)        PAST SURGICAL HISTORY:  Past Surgical History:   Procedure Laterality Date    APPENDECTOMY      CHOLECYSTECTOMY, LAPOROSCOPIC      Cholecystectomy, Laparoscopic    HYSTERECTOMY, PAP NO LONGER INDICATED  1981    menorrhagia    JOINT REPLACEMENT, HIP RT/LT  fall 2002    right    JOINT REPLACEMTN, KNEE RT/LT  2002    Joint Replacement knee RT/LT         CURRENT MEDICATIONS:    Current Facility-Administered Medications   Medication Dose Route Frequency Provider Last Rate Last Admin    aspirin (ASA) Suppository 300 mg  300 mg Rectal Once Willie Carter, DO         Current Outpatient Medications   Medication Sig Dispense Refill    acetaminophen (TYLENOL) 500 MG tablet Take 1,000 mg by mouth every 6 hours as needed for pain      amoxicillin (AMOXIL) 500 MG capsule Take 500 mg by mouth 4 tablets 1 hour before dental.      guaiFENesin (ROBITUSSIN) 20 mg/mL liquid Take 200 mg by mouth every 4 hours as needed for cough      levothyroxine (SYNTHROID/LEVOTHROID) 100 MCG tablet Take 100 mcg by mouth daily      lisinopril (ZESTRIL) 5 MG tablet Take 5 mg by mouth daily      loperamide (IMODIUM) 2 MG capsule Take 2 mg by mouth daily as needed for diarrhea      QUEtiapine (SEROQUEL) 25 MG tablet Take 1.5 tablets (37.5 mg) by mouth every evening at 1700. 60 tablet 0    QUEtiapine (SEROQUEL) 25 MG tablet Take 0.5 tablets (12.5 mg) by mouth daily as needed (Agitation) 30 tablet 0    traZODone (DESYREL) 50 MG tablet Take 0.5 tablets (25 mg) by mouth nightly as needed for sleep 30 tablet 0         ALLERGIES:  Allergies   Allergen Reactions    Morphine Rash and Unknown     Red line went up her arm when given  Other reaction(s): Erythema  Red line went up her arm when given      Tramadol      agitation    Oxycodone-Acetaminophen Rash       FAMILY HISTORY:  Family " History   Problem Relation Age of Onset    Diabetes Daughter        SOCIAL HISTORY:  Social History     Socioeconomic History    Marital status:    Tobacco Use    Smoking status: Former     Types: Cigarettes     Start date: 4/1/2002    Smokeless tobacco: Never   Substance and Sexual Activity    Alcohol use: Yes     Comment: 2 beers a week    Drug use: No    Sexual activity: Not Currently   Other Topics Concern    Parent/sibling w/ CABG, MI or angioplasty before 65F 55M? No     Social Drivers of Health     Financial Resource Strain: Low Risk  (10/25/2023)    [facility]    Financial Resource Strain     Difficulty of Paying Living Expenses: 3   Food Insecurity: No Food Insecurity (10/25/2023)    [facility]    Food Insecurity     Do you worry your food will run out before you are able to buy more?: 1   Transportation Needs: No Transportation Needs (10/25/2023)    [facility]    Transportation Needs     Lack of Transportation (Medical): 1   Social Connections: Socially Integrated (10/25/2023)    Received from [facility], [facility]    Social Connections     Frequency of Communication with Friends and Family: 0   Housing Stability: Low Risk  (10/25/2023)    Received from [facility]    Housing Stability     What is your housing situation today?: 1       VITALS:  Patient Vitals for the past 24 hrs:   BP Pulse Resp SpO2   12/03/24 1409 (!) 180/86 84 -- --   12/03/24 1251 -- 65 21 --   12/03/24 1250 -- 63 20 97 %   12/03/24 1247 (!) 163/65 66 -- 97 %       PHYSICAL EXAM    VITAL SIGNS: BP (!) 180/86   Pulse 84   Resp 21   SpO2 97%     General Appearance: Well-appearing,  well-nourished, no acute distress   Head:  Normocephalic, without obvious abnormality, atraumatic  Eyes:  PERRL, conjunctiva/corneas clear, EOM's intact,  ENT:  Lips, mucosa, and tongue normal, membranes are moist without pallor  Neck:  Normal ROM, symmetrical, trachea midline    Chest:  No tenderness or deformity, no crepitus  Cardio:  Regular rate and rhythm, no murmur, rub or gallop, 2+ pulses symmetric in all extremities  Pulm:  Clear to auscultation bilaterally, respirations unlabored,  Back:  ROM normal, no CVA tenderness, no spinal tenderness, no paraspinal tenderness  Abdomen:  Soft, non-tender, no rebound or guarding.  Musculoskeletal: Full ROM, no edema, no cyanosis, good ROM of major joints  Integument:  Warm, Dry, No erythema, No rash.    Neurologic:  Alert and oriented only to self. Right arm drift. Decreased strength to right leg. Right facial droop.  Psychiatric:  Mood normal.      [employer] Stroke Scale  Exam Interval: Baseline   Score    Level of consciousness: (0)   Alert, keenly responsive    LOC questions: (0)   Answers both questions correctly    LOC commands: (1)   Performs one task correctly    Best gaze: (0)   Normal    Visual: (0)   No visual loss    Facial palsy: (2)   Partial paralysis (total/near total of lower face)    Motor arm (left): (0)   No drift    Motor arm (right): (2)   Some effort against gravity    Motor leg (left): (0)   No drift    Motor leg (right): (1)   Drift    Limb ataxia: (0)   Absent    Sensory: (0)   Normal- no sensory loss    Best language: (0)   Normal- no aphasia    Dysarthria: (1)   Mild to moderate dysarthria    Extinction and inattention: (0)   No abnormality        Total Score:  9          LABS  Results for orders placed or performed during the hospital encounter of 12/03/24 (from the past 24 hours)   CTA Head Neck with Contrast    Narrative    EXAM: CTA HEAD NECK W CONTRAST  LOCATION: [facility]  DATE:  12/3/2024    INDICATION: Slurred speech, right facial droop, left arm weakness, agitation. Hyperglycemia. Strokelike symptoms.  COMPARISON: CT brain April 4, 2024.  CONTRAST: [id number] 67ml  TECHNIQUE: Head and neck CT angiogram with IV contrast. Noncontrast head CT followed by axial helical CT images of the head and neck vessels obtained during the arterial phase of intravenous contrast administration. Axial 2D reconstructed images and   multiplanar 3D MIP reconstructed images of the head and neck vessels were performed by the technologist. Dose reduction techniques were used. All stenosis measurements made according to NASCET criteria unless otherwise specified.    FINDINGS:   NONCONTRAST HEAD CT:   INTRACRANIAL CONTENTS: No intracranial hemorrhage, extraaxial collection, or mass effect.  No CT evidence of acute infarct. Moderate presumed chronic small vessel ischemic changes. Moderate generalized volume loss. No hydrocephalus. Skull base vascular   calcifications. No evidence for loss of gray matter/white matter differentiation to indicate acute transcortical infarct.     VISUALIZED ORBITS/SINUSES/MASTOIDS: Prior bilateral cataract surgery. Visualized portions of the orbits are otherwise unremarkable. No paranasal sinus mucosal disease. No middle ear or mastoid effusion. Moderate TMJ arthropathy.    BONES/SOFT TISSUES: No acute abnormality.    HEAD CTA:  ANTERIOR CIRCULATION: Carotid siphon and supraclinoid vascular calcifications with moderate stenoses bilaterally. Mild stenosis of the distal M1 segment of the right middle cerebral artery.    Severe, focal stenosis of the mid M1 segment of the left middle cerebral artery. Moderate moderate stenoses of the distal left MCA M1 segment and proximal superior division M2 segment. Mild stenoses of the A3 segment of the right anterior cerebral   artery. There is also mild focal stenosis of the right anterior cerebral artery A4 branch segment. Vascular supply to the  "posterior cerebral arteries from the posterior communicating arteries.     POSTERIOR CIRCULATION: Left vertebral artery terminates in the left PICA. Mild stenosis of the distal right vertebral artery V4 segment. Occlusion of the mid to distal basilar artery is present. Indeterminate age but may be chronic since there is fairly   good collateral flow to the distal posterior circulation vessels from the posterior communicating arteries. Consider MRI brain with diffusion if there are signs or symptoms of posterior circulation ischemia. No evidence for discrete intraluminal filling   defect or vascular hyperdensity on the conventional CT.    High-grade stenosis of the proximal right posterior cerebral artery. Distal right PCA branch occlusion is present without evidence for definite discrete filling defect. May be chronic. Moderate stenosis of the P2 segment of the left posterior cerebral   artery. Distal left PCA branches unremarkable.    DURAL VENOUS SINUSES: Expected enhancement of the major dural venous sinuses.    NECK CTA:  RIGHT CAROTID: 70% stenosis at the right ICA origin. Heavily calcified plaque. No dissection.    LEFT CAROTID: 20% stenosis at the left ICA origin. Calcified plaque. No dissection.    VERTEBRAL ARTERIES: No focal stenosis or dissection. Balanced vertebral arteries.    AORTIC ARCH: Vascular variant aortic arch origin left vertebral artery.  No significant stenosis at the origin of the great vessels. Aberrant right subclavian artery with mild-to-moderate stenosis of the proximal portion. Moderate to high-grade stenosis   of the left subclavian artery origin.    NONVASCULAR STRUCTURES: Moderate cervical spondylosis.      Impression    IMPRESSION:   HEAD CT:  1.  No evidence for acute infarct, mass or hemorrhage. Atrophy and chronic vascular changes. No \"dense vessel\" sign to strongly suggest acute intraluminal thrombus    HEAD CTA:   1.  Extensive, multifocal intracranial stenoses which are likely " atherosclerotic.    2.  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 Correlate with any signs or symptoms of posterior fossa ischemia and consider further evaluation with MRI with diffusion. No definite acute intraluminal thrombus/meniscus sign or dense   vessel on the conventional CT.    3.  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 Correlate clinically.    4.  Findings discussed with Dr. Carter in the emergency department on 12/3/2024 1243 PM CST    NECK CTA:  1.  High-grade stenosis at right ICA origin    2.  Mild stenosis at left ICA origin.    3.   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   CBC with Platelets & Differential    Narrative    The following orders were created for panel order CBC with Platelets & Differential.  Procedure                               Abnormality         Status                     ---------                               -----------         ------                     CBC with platelets and d...[[id number]]  Abnormal            Final result               RBC and Platelet Morphology[[id number]]  Abnormal            Final result                 Please view results for these tests on the individual orders.   Basic metabolic panel   Result Value Ref Range    Sodium 138 135 - 145 mmol/L    Potassium 4.0 3.4 - 5.3 mmol/L    Chloride 106 98 - 107 mmol/L    Carbon Dioxide (CO2) 24 22 - 29 mmol/L    Anion Gap 8 7 - 15 mmol/L    Urea Nitrogen 6.3 (L) 8.0 - 23.0 mg/dL    Creatinine 0.75 0.51 - 0.95 mg/dL    GFR Estimate 75 >60 mL/min/1.73m2    Calcium 8.9 8.8 - 10.4 mg/dL    Glucose 128 (H) 70 - 99 mg/dL   INR   Result Value Ref Range    INR 0.98 0.85 - 1.15   Partial thromboplastin time   Result Value Ref Range    aPTT 26 22 - 38 Seconds   Troponin T, High Sensitivity   Result Value Ref Range    Troponin T, High Sensitivity 16 (H) <=14 ng/L   [place] Draw    Narrative    The following orders were created for panel order  [place] Draw.  Procedure                               Abnormality         Status                     ---------                               -----------         ------                     Extra Red Top Tube[[id number]]                               Final result                 Please view results for these tests on the individual orders.   CBC with platelets and differential   Result Value Ref Range    WBC Count 16.5 (H) 4.0 - 11.0 10e3/uL    RBC Count 3.98 3.80 - 5.20 10e6/uL    Hemoglobin 12.5 11.7 - 15.7 g/dL    Hematocrit 38.9 35.0 - 47.0 %    MCV 98 78 - 100 fL    MCH 31.4 26.5 - 33.0 pg    MCHC 32.1 31.5 - 36.5 g/dL    RDW 13.3 10.0 - 15.0 %    Platelet Count 159 150 - 450 10e3/uL    % Neutrophils 23 %    % Lymphocytes 73 %    % Monocytes 3 %    % Eosinophils 0 %    % Basophils 0 %    % Immature Granulocytes 0 %    NRBCs per 100 WBC 0 <1 /100    Absolute Neutrophils 3.8 1.6 - 8.3 10e3/uL    Absolute Lymphocytes 12.1 (H) 0.8 - 5.3 10e3/uL    Absolute Monocytes 0.5 0.0 - 1.3 10e3/uL    Absolute Eosinophils 0.0 0.0 - 0.7 10e3/uL    Absolute Basophils 0.1 0.0 - 0.2 10e3/uL    Absolute Immature Granulocytes 0.0 <=0.4 10e3/uL    Absolute NRBCs 0.0 10e3/uL   Extra Red Top Tube   Result Value Ref Range    Hold Specimen [facility]    RBC and Platelet Morphology   Result Value Ref Range    RBC Morphology Confirmed RBC Indices     Platelet Assessment  Automated Count Confirmed. Platelet morphology is normal.     Automated Count Confirmed. Platelet morphology is normal.    Smudge Cells Present (A) None Seen   MR Brain w/o Contrast    Narrative    EXAM: MR BRAIN W/O CONTRAST  LOCATION: [facility]  DATE: 12/3/2024    INDICATION: right sided facial weakness, aphasia, dysarthria  COMPARISON: CT brain 3 December 2024.  TECHNIQUE: Head MRI without IV contrast performed according to the quick brain protocol with rapid imaging sequences.    FINDINGS:  INTRACRANIAL CONTENTS: No acute or subacute infarct. No mass,  "acute hemorrhage, or extra-axial fluid collections. Patchy and confluent nonspecific T2/FLAIR hyperintensities within the cerebral white matter most consistent with moderate chronic   microvascular ischemic change. Moderate generalized cerebral atrophy. No hydrocephalus. Normal position of the cerebellar tonsils. Motion artifact obscures some anatomic detail on the diffusion-weighted imaging sequences. Apparent increased DWI signal   within the brainstem is present symmetrically and compatible with normal diffusion anisotropy.    OTHER: Accounting for technique no additional abnormalities identified.      Impression    IMPRESSION:  1.  No acute intracranial process.  2.  Generalized brain atrophy and presumed microvascular ischemic changes as detailed above.         RADIOLOGY  MR Brain w/o Contrast   Final Result   IMPRESSION:   1.  No acute intracranial process.   2.  Generalized brain atrophy and presumed microvascular ischemic changes as detailed above.      CTA Head Neck with Contrast   Final Result   IMPRESSION:    HEAD CT:   1.  No evidence for acute infarct, mass or hemorrhage. Atrophy and chronic vascular changes. No \"dense vessel\" sign to strongly suggest acute intraluminal thrombus      HEAD CTA:    1.  Extensive, multifocal intracranial stenoses which are likely atherosclerotic.      2.  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 Correlate with any signs or symptoms of posterior fossa ischemia and consider further evaluation with MRI with diffusion. No definite acute intraluminal thrombus/meniscus sign or dense    vessel on the conventional CT.      3.  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 Correlate clinically.      4.  Findings discussed with Dr. Carter in the emergency department on 12/3/2024 1243 PM CST      NECK CTA:   1.  High-grade stenosis at right ICA origin      2.  Mild stenosis at left ICA origin.      3.   Aortic arch great vessel origin mild congenital " anomalies and atherosclerotic disease.      XR Chest Port 1 View    (Results Pending)           MEDICATIONS GIVEN IN THE EMERGENCY:  Medications   aspirin (ASA) Suppository 300 mg (has no administration in time range)   iopamidol (ISOVUE-370) solution 67 mL (67 mLs Intravenous $Given 12/3/24 1238)   LORazepam (ATIVAN) injection 0.5 mg (0.5 mg Intravenous $Given 12/3/24 1319)   LORazepam (ATIVAN) injection 0.5 mg (0.5 mg Intravenous $Given 12/3/24 1336)   LORazepam (ATIVAN) injection 0.5 mg (0.5 mg Intravenous $Given 12/3/24 1415)       NEW PRESCRIPTIONS STARTED AT TODAY'S ER VISIT  New Prescriptions    No medications on file        I, Saurav Núñez, am serving as a scribe to document services personally performed by Willie Carter D.O., based on my observations and the provider's statements to me.  I, Willie Carter D.O., attest that Saurav Núñez is acting in a scribe capacity, has observed my performance of the services and has documented them in accordance with my direction.     Willie Carter D.O.  Emergency Medicine  [facility] EMERGENCY DEPARTMENT  [facility] [facility] [id number] [phone]  Dept: [phone]       Willie Carter,   12/03/24 9905

## 2024-12-03 NOTE — ED PROVIDER NOTES
EMERGENCY DEPARTMENT SIGN OUT NOTE        ED COURSE AND MEDICAL DECISION MAKING  Patient was signed out to me by Dr Willie Carter at 2:15 PM    In brief, Mariela Ortiz is a 89 year old female who initially presented for stroke like symptoms.      At time of sign out, disposition was pending UA as well as chest x-ray to evaluate for potential underlying infectious etiologies of her encephalopathy/neurological changes here today.    Overall labs show a leukocytosis at 16,000 but no significant metabolic derangements.  Initial troponin is slightly elevated at 16 but no clear ischemic changes on her EKG and will repeat a delta troponin. Chest x-ray does not show any acute cardiopulmonary abnormalities.  UA also not suggestive of infection.    Discussed lab results with family and they do note that she has a history of CLL and historically has a history of high white count. Back in November at [facility] her white count was in the 18,000 therefore weight conceptual but better today.    On repeat exam has no tenderness despite deep palpation.  Low suspicion for acute intra-abdominal process.  Patient family notes a history of somewhat chronic decreased appetite but no recent vomiting or complaints of abdominal pain.     Ultimately etiology of patient's encephalopathy remains somewhat unclear at this point in time however somewhat acute change compared to her baseline do believe she will require admission for ongoing evaluation.  Discussed with the hospitalist service who agrees to meet the patient at this point in time for ongoing management.      ED Course as of 12/03/24 2340   Tue Dec 03, 2024   1504 EKG shows sinus rhythm 68 beats a minute, normal axis, normal TN interval,  ms with left lateral branch morphology, QTc 469 ms, no ST elevations or acute ischemic T wave changes.        FINAL IMPRESSION    1. Altered mental status, unspecified altered mental status type        ED MEDS  Medications   iopamidol  "(ISOVUE-370) solution 67 mL (67 mLs Intravenous $Given 12/3/24 1238)   LORazepam (ATIVAN) injection 0.5 mg (0.5 mg Intravenous $Given 12/3/24 1319)   LORazepam (ATIVAN) injection 0.5 mg (0.5 mg Intravenous $Given 12/3/24 1336)   LORazepam (ATIVAN) injection 0.5 mg (0.5 mg Intravenous $Given 12/3/24 1415)   aspirin (ASA) Suppository 300 mg (300 mg Rectal $Given 12/3/24 1506)   OLANZapine (zyPREXA) injection 2.5 mg (2.5 mg Intramuscular $Given 12/3/24 1612)       LAB      EKG      RADIOLOGY    XR Chest Port 1 View   Final Result   IMPRESSION: Negative chest.      MR Brain w/o Contrast   Final Result   IMPRESSION:   1.  No acute intracranial process.   2.  Generalized brain atrophy and presumed microvascular ischemic changes as detailed above.      CTA Head Neck with Contrast   Final Result   IMPRESSION:    HEAD CT:   1.  No evidence for acute infarct, mass or hemorrhage. Atrophy and chronic vascular changes. No \"dense vessel\" sign to strongly suggest acute intraluminal thrombus      HEAD CTA:    1.  Extensive, multifocal intracranial stenoses which are likely atherosclerotic.      2.  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 Correlate with any signs or symptoms of posterior fossa ischemia and consider further evaluation with MRI with diffusion. No definite acute intraluminal thrombus/meniscus sign or dense    vessel on the conventional CT.      3.  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 Correlate clinically.      4.  Findings discussed with Dr. Carter in the emergency department on 12/3/2024 1243 PM CST      NECK CTA:   1.  High-grade stenosis at right ICA origin      2.  Mild stenosis at left ICA origin.      3.   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          DISCHARGE MEDS  New Prescriptions    No medications on file     Alan Riley MD  [facility] EMERGENCY DEPARTMENT  [address] [facility] [place] " [id number]  [phone]     Alan Riley MD  12/03/24 233       Alan Riley MD  12/03/24 [id number]

## 2024-12-03 NOTE — MEDICATION SCRIBE - ADMISSION MEDICATION HISTORY
Medication Scribe Admission Medication History    Admission medication history is complete. The information provided in this note is only as accurate as the sources available at the time of the update.    Information Source(s): Family member via in-person    Pertinent Information: patient's medications are being managed by daughterCatalina. Catalina @ [phone] was present and gave report.     Changes made to PTA medication list:  Added: B12  Deleted: Tylenol, Robitussin, Seroquel, Trazodone (all per family member)  Changed: None    Allergies reviewed with patient and updates made in EHR: yes    Medication History Completed By: Clif Marcelo 12/3/2024 2:45 PM    PTA Med List   Medication Sig Last Dose/Taking    amoxicillin (AMOXIL) 500 MG tablet Take 4 tablets by mouth once. 1 hour prior to dental appointment Taking    levothyroxine (SYNTHROID/LEVOTHROID) 100 MCG tablet Take 100 mcg by mouth every morning. 12/3/2024 Morning    lisinopril (ZESTRIL) 5 MG tablet Take 5 mg by mouth every morning. 12/3/2024 Morning    loperamide (IMODIUM) 2 MG capsule Take 2 mg by mouth daily as needed for diarrhea Taking As Needed    vitamin B-12 (CYANOCOBALAMIN) 1000 MCG tablet Take 1 tablet by mouth every morning. 12/3/2024 Morning

## 2024-12-03 NOTE — ED NOTES
Pt arrives to room after CT via cart.  Pt very agitated.  Occasionally following commands.  Stroke eval done with stroke team via camera. Last known well was last evening around 5-530 pm.  Today presents with right facial droop. Slurred speech and word find issues.  Pt has history of increasing memory loss and agitation per daughters.  Also hx of sun downers on last admission. Pt remained very agitated and was treated with medications per orders.  Family present at bedside all shift.  1:1 requested

## 2024-12-03 NOTE — CONSULTS
[facility]     Stroke Code Note          History of Present Illness     Chief Complaint: Stroke Symptoms      Mariela Ortiz is a 89 year old right-handed female who presented to the ED today with right-sided facial weakness, slurred speech, mixed aphasia. Her last known well is yesterday around 1700 when one of her daughters saw her last. When her daughter got home from work today around 1030, symptoms were noted.   She has mild cognitive impairment at baseline. She lives with her daughter and gets help with IADLs and some ADLs (family cooks for her and prompts her to eat, sets up her bath, does medication management). She is able to get around the house independently but uses a walker outside of the home.   She does not have any contraindications for thrombolysis. Given that symptom onset time is unknown, we opted to get a hyperacute MRI. Images are motion degraded but no obvious acute pathology. Thrombolysis was deferred.          Past Medical History     Stroke risk factors: DM, CLL, alcohol use (2-3 beers daily), HLD, HTN, thyroid disease    Preadmission antithrombotic regimen: none     Modified [place] Score (Pre-morbid)  3 - Moderate disability.  Requires some help, but able to walk unassisted.                   Assessment and Plan       1.  Severe left M1 stenosis  2.  Dysarthria, aphasia, right sided facial weakness, mild right-sided sensory deficit concerning for stroke. Differential also includes hypertensive encephalopathy, toxic/metabolic process      Intravenous Thrombolysis  Not given due to:   - unclear or unfavorable risk-benefit profile for extended window thrombolysis beyond the conventional 4.5 hour time window     Endovascular Treatment  Not initiated due to absence of proximal vessel occlusion     Plan:  - toxic/metabolic work up   - Place General Neurology Consult order  - Neurochecks and Vital Signs every 4 hours    - Permissive HTN; goal SBP < 220 mmHg  -  "aspirin 300 mg IL x 1  - high intensity statin given ICAD  - TTE (with Bubble Study if age 60 yrs or less)  - Telemetry, EKG  - Bedside Glucose Monitoring  - Nutrition: per SLP   - A1c, Lipid Panel, Troponin x 3  - PT/OT/SLP  - Euthermia, Euglycemia     ___________________________________________________________________    Jayla Sarkar, NP  Vascular Neurology    To page me or covering stroke neurology team member, click here: [facility]  Choose \"On Call\" tab at top, then select \"NEUROLOGY/ALL SITES\" from middle drop-down box, press Enter, then look for \"stroke\" or \"telestroke\" for your site.  ___________________________________________________________________        Imaging/Labs   (personally reviewed CT, CTA)    CT head: no acute pathology   CTA head/neck: multifocal ICAD, multifocal severe left M1 stenosis, likely chronic distal basilar artery occlusion, severe right ICA stenosis  Hyperacute MRI: limited by motion artifact, no obvious acute pathology          Physical Examination     BP: (!) 180/86   Pulse: 84   Resp: 21           SpO2: 97 %            Wt Readings from Last 2 Encounters:   04/13/24 57.4 kg (126 lb 8.7 oz)   01/09/20 60.8 kg (134 lb)       Neuro Exam  Mental Status:  alert, unable to answer orientation questions, follows simple commands with a lot of encouragement, speech dysarthria, decreased fluency, unable to describe NIHSS picture, able to identify 4/6 single items, able to read single words, frequent mumbling   Cranial Nerves:  blinks to threat bilaterally (tested by nurse), EOMs intact but she does seem to prefer looking to the left, facial sensation intact and symmetric (tested by nurse), right sided facial weakness, hard of hearing, mild-moderate dysarthria, shoulder shrug equal bilaterally, tongue protrusion midline  Motor:  no abnormal movements, able to move all limbs antigravity spontaneously with no signs of hemiparesis observed, no pronator drift  Reflexes:  unable to test " (telestroke)  Sensory:   withdraws from noxious stimuli in all limbs but brisker on the left  Coordination:  normal finger-to-nose and heel-to-shin bilaterally without dysmetria  Station/Gait:  unable to test due to telestroke        Stroke Scales       NIHSS  1a. Level of Consciousness 0-->Alert, keenly responsive   1b. LOC Questions 2-->Answers neither question correctly   1c. LOC Commands 0-->Performs both tasks correctly   2.   Best Gaze 0-->Normal   3.   Visual 0-->No visual loss   4.   Facial Palsy 1-->Minor paralysis (flattened nasolabial fold, asymmetry on smiling)   5a. Motor Arm, Left 0-->No drift, limb holds 90 (or 45) degrees for full 10 secs   5b. Motor Arm, Right 0-->No drift, limb holds 90 (or 45) degrees for full 10 secs   6a. Motor Leg, Left 0-->No drift, leg holds 30 degree position for full 5 secs   6b. Motor Leg, right 0-->No drift, leg holds 30 degree position for full 5 secs   7.   Limb Ataxia 0-->Absent   8.   Sensory 1-->Mild-to-moderate sensory loss, patient feels pinprick is less sharp or is dull on the affected side, or there is a loss of superficial pain with pinprick, but patient is aware of being touched   9.   Best Language 1-->Mild-to-moderate aphasia, some obvious loss of fluency or facility of comprehension, without significant limitation on ideas expressed or form of expression. Reduction of speech and/or comprehension, however, makes conversation. . . (see row details)   10. Dysarthria 1-->Mild-to-moderate dysarthria, patient slurs at least some words and, at worst, can be understood with some difficulty   11. Extinction and Inattention  0-->No abnormality   Total 6 (12/03/24 1243)            Labs     CBC  Lab Results   Component Value Date    HGB 12.5 12/03/2024    HCT 38.9 12/03/2024    WBC 16.5 (H) 12/03/2024     12/03/2024       BMP  Lab Results   Component Value Date     12/03/2024    POTASSIUM 4.0 12/03/2024    CHLORIDE 106 12/03/2024    CO2 24 12/03/2024    BUN  6.3 (L) 12/03/2024    CR 0.75 12/03/2024     (H) 12/03/2024    VAL 8.9 12/03/2024       INR  INR   Date Value Ref Range Status   12/03/2024 0.98 0.85 - 1.15 Final       Data   Stroke Code Data  (for stroke code with tele)  Stroke code activated 12/03/24  1223   First stroke provider response 12/03/24  1225   Video start time 12/03/24  1240   Video end time 12/03/24  1413   Last known normal 12/02/24  1700   Time of discovery (or onset of symptoms)  12/03/24  1030   Head CT read by Stroke Neuro Provider 12/03/24  1240   Was stroke code de-escalated? Yes  12/03/24  1414     Telestroke Service Details  Type of service telemedicine diagnostic assessment of acute neurological changes   Reason telemedicine is appropriate patient requires assessment with a specialist for diagnosis and treatment of neurological symptoms   Mode of transmission secure interactive audio and video communication per [facility] site (patient location) [facility]    Distant site (provider location) [facility]        Clinically Significant Risk Factors Present on Admission                   # Hypertension: Noted on problem list     # Dementia: noted on problem list                 Time Spent on this Encounter   Billing: I personally examined and evaluated the patient today. At the time of my evaluation and management the patient was critical condition today due to acute neurological changes. I personally managed review of labs and images, neuro exam. Key decisions made today included hyperacute MRI. I spent a total of 80 minutes providing critical care services, evaluating the patient, directing care and reviewing laboratory values and radiologic reports.

## 2024-12-04 PROBLEM — I63.412 EMBOLIC STROKE INVOLVING LEFT MIDDLE CEREBRAL ARTERY (H): Status: ACTIVE | Noted: 2024-12-04

## 2024-12-04 PROBLEM — M26.629 TMJ SYNDROME: Status: ACTIVE | Noted: 2021-04-20

## 2024-12-04 LAB
CHOLEST SERPL-MCNC: 190 MG/DL
EST. AVERAGE GLUCOSE BLD GHB EST-MCNC: 97 MG/DL
GLUCOSE BLDC GLUCOMTR-MCNC: 103 MG/DL (ref 70–99)
GLUCOSE BLDC GLUCOMTR-MCNC: 104 MG/DL (ref 70–99)
GLUCOSE BLDC GLUCOMTR-MCNC: 115 MG/DL (ref 70–99)
HBA1C MFR BLD: 5 %
HDLC SERPL-MCNC: 52 MG/DL
LDLC SERPL CALC-MCNC: 110 MG/DL
NONHDLC SERPL-MCNC: 138 MG/DL
PROLACTIN SERPL 3RD IS-MCNC: 14 NG/ML (ref 5–23)
TRIGL SERPL-MCNC: 139 MG/DL
TROPONIN T SERPL HS-MCNC: 27 NG/L

## 2024-12-04 PROCEDURE — 99418 PROLNG IP/OBS E/M EA 15 MIN: CPT | Performed by: PSYCHIATRY & NEUROLOGY

## 2024-12-04 PROCEDURE — 80061 LIPID PANEL: CPT | Performed by: PSYCHIATRY & NEUROLOGY

## 2024-12-04 PROCEDURE — 84484 ASSAY OF TROPONIN QUANT: CPT | Performed by: INTERNAL MEDICINE

## 2024-12-04 PROCEDURE — 250N000013 HC RX MED GY IP 250 OP 250 PS 637: Performed by: PSYCHIATRY & NEUROLOGY

## 2024-12-04 PROCEDURE — 36415 COLL VENOUS BLD VENIPUNCTURE: CPT | Performed by: PSYCHIATRY & NEUROLOGY

## 2024-12-04 PROCEDURE — 84146 ASSAY OF PROLACTIN: CPT | Performed by: PSYCHIATRY & NEUROLOGY

## 2024-12-04 PROCEDURE — 120N000001 HC R&B MED SURG/OB

## 2024-12-04 PROCEDURE — 99233 SBSQ HOSP IP/OBS HIGH 50: CPT | Performed by: HOSPITALIST

## 2024-12-04 PROCEDURE — 250N000013 HC RX MED GY IP 250 OP 250 PS 637: Performed by: INTERNAL MEDICINE

## 2024-12-04 PROCEDURE — 36415 COLL VENOUS BLD VENIPUNCTURE: CPT | Performed by: INTERNAL MEDICINE

## 2024-12-04 PROCEDURE — 83036 HEMOGLOBIN GLYCOSYLATED A1C: CPT | Performed by: HOSPITALIST

## 2024-12-04 PROCEDURE — 99223 1ST HOSP IP/OBS HIGH 75: CPT | Performed by: PSYCHIATRY & NEUROLOGY

## 2024-12-04 PROCEDURE — 36415 COLL VENOUS BLD VENIPUNCTURE: CPT | Performed by: HOSPITALIST

## 2024-12-04 PROCEDURE — 250N000013 HC RX MED GY IP 250 OP 250 PS 637: Performed by: HOSPITALIST

## 2024-12-04 PROCEDURE — 258N000003 HC RX IP 258 OP 636: Performed by: HOSPITALIST

## 2024-12-04 PROCEDURE — 92523 SPEECH SOUND LANG COMPREHEN: CPT | Mod: GN

## 2024-12-04 PROCEDURE — 258N000003 HC RX IP 258 OP 636: Performed by: INTERNAL MEDICINE

## 2024-12-04 PROCEDURE — 82465 ASSAY BLD/SERUM CHOLESTEROL: CPT | Performed by: PSYCHIATRY & NEUROLOGY

## 2024-12-04 PROCEDURE — 92610 EVALUATE SWALLOWING FUNCTION: CPT | Mod: GN

## 2024-12-04 PROCEDURE — 250N000011 HC RX IP 250 OP 636: Performed by: INTERNAL MEDICINE

## 2024-12-04 PROCEDURE — 250N000011 HC RX IP 250 OP 636: Performed by: HOSPITALIST

## 2024-12-04 RX ORDER — ATORVASTATIN CALCIUM 40 MG/1
40 TABLET, FILM COATED ORAL EVERY EVENING
Status: DISCONTINUED | OUTPATIENT
Start: 2024-12-04 | End: 2024-12-06

## 2024-12-04 RX ORDER — SODIUM CHLORIDE, SODIUM LACTATE, POTASSIUM CHLORIDE, CALCIUM CHLORIDE 600; 310; 30; 20 MG/100ML; MG/100ML; MG/100ML; MG/100ML
INJECTION, SOLUTION INTRAVENOUS CONTINUOUS
Status: ACTIVE | OUTPATIENT
Start: 2024-12-04 | End: 2024-12-04

## 2024-12-04 RX ORDER — CLOPIDOGREL BISULFATE 75 MG/1
75 TABLET ORAL DAILY
Status: DISCONTINUED | OUTPATIENT
Start: 2024-12-04 | End: 2024-12-06

## 2024-12-04 RX ORDER — HYDRALAZINE HYDROCHLORIDE 20 MG/ML
10-20 INJECTION INTRAMUSCULAR; INTRAVENOUS
Status: DISCONTINUED | OUTPATIENT
Start: 2024-12-04 | End: 2024-12-06

## 2024-12-04 RX ORDER — BISACODYL 10 MG
10 SUPPOSITORY, RECTAL RECTAL DAILY PRN
Status: DISCONTINUED | OUTPATIENT
Start: 2024-12-04 | End: 2024-12-06

## 2024-12-04 RX ORDER — LABETALOL HYDROCHLORIDE 5 MG/ML
10-20 INJECTION, SOLUTION INTRAVENOUS EVERY 10 MIN PRN
Status: DISCONTINUED | OUTPATIENT
Start: 2024-12-04 | End: 2024-12-06

## 2024-12-04 RX ORDER — ASPIRIN 81 MG/1
81 TABLET, CHEWABLE ORAL DAILY
Status: DISCONTINUED | OUTPATIENT
Start: 2024-12-04 | End: 2024-12-06

## 2024-12-04 RX ORDER — LORAZEPAM 2 MG/ML
0.5 INJECTION INTRAMUSCULAR ONCE
Status: COMPLETED | OUTPATIENT
Start: 2024-12-04 | End: 2024-12-04

## 2024-12-04 RX ADMIN — ASPIRIN 81 MG CHEWABLE TABLET 81 MG: 81 TABLET CHEWABLE at 15:21

## 2024-12-04 RX ADMIN — OLANZAPINE 2.5 MG: 10 INJECTION, POWDER, FOR SOLUTION INTRAMUSCULAR at 00:36

## 2024-12-04 RX ADMIN — Medication 3 MG: at 17:25

## 2024-12-04 RX ADMIN — CLOPIDOGREL BISULFATE 75 MG: 75 TABLET ORAL at 15:21

## 2024-12-04 RX ADMIN — QUETIAPINE FUMARATE 25 MG: 25 TABLET ORAL at 15:17

## 2024-12-04 RX ADMIN — SODIUM CHLORIDE, POTASSIUM CHLORIDE, SODIUM LACTATE AND CALCIUM CHLORIDE: 600; 310; 30; 20 INJECTION, SOLUTION INTRAVENOUS at 09:47

## 2024-12-04 RX ADMIN — ENOXAPARIN SODIUM 40 MG: 40 INJECTION SUBCUTANEOUS at 20:49

## 2024-12-04 RX ADMIN — LORAZEPAM 0.5 MG: 2 INJECTION INTRAMUSCULAR; INTRAVENOUS at 10:22

## 2024-12-04 ASSESSMENT — ACTIVITIES OF DAILY LIVING (ADL)
ADLS_ACUITY_SCORE: 66
ADLS_ACUITY_SCORE: 68
ADLS_ACUITY_SCORE: 68
ADLS_ACUITY_SCORE: 66
ADLS_ACUITY_SCORE: 66
ADLS_ACUITY_SCORE: 68
ADLS_ACUITY_SCORE: 68
ADLS_ACUITY_SCORE: 66
ADLS_ACUITY_SCORE: 70
ADLS_ACUITY_SCORE: 66
ADLS_ACUITY_SCORE: 68
ADLS_ACUITY_SCORE: 68
ADLS_ACUITY_SCORE: 66
ADLS_ACUITY_SCORE: 68
ADLS_ACUITY_SCORE: 68
ADLS_ACUITY_SCORE: 70
ADLS_ACUITY_SCORE: 66

## 2024-12-04 NOTE — ED NOTES
[facility] ED Handoff Report    ED Chief Complaint: Altered Mental Status    ED Diagnosis:  (R41.82) Altered mental status, unspecified altered mental status type  Comment:   Plan:     (G93.40) Acute encephalopathy  Comment:   Plan:        PMH:    Past Medical History:   Diagnosis Date    Melanoma (H)         Code Status:  Prior     Falls Risk: Yes Band: Applied    Current Living Situation/Residence: lives in a house with her daughter    Elimination Status: Continent: No and wears briefs     Activity Level: 2 assist    Patients Preferred Language:  English     Needed: No    Vital Signs:  BP (!) 172/72   Pulse 85   Resp 29   Wt 53.9 kg (118 lb 12.8 oz)   SpO2 98%   BMI 21.73 kg/m       Cardiac Rhythm: NSR    Pain Score: 0/10    Is the Patient Confused:  Yes    Last Food or Drink: Family at bedside is not sure when pt ate last.    Focused Assessment:  Pt is more confused than baseline, per family, and not making sense when speaking. Speech is slurred. No chest pain. No shortness of breath. History of dementia.    Tests Performed: Done: Labs and Imaging    Treatments Provided:  Ativan, Aspirin, Zyprexa, IV fluids    Family Dynamics/Concerns: No    Family Updated On Visitor Policy: Yes    Plan of Care Communicated to Family: Yes    Who Was Updated about Plan of Care: 2 of the pt's daughters    Belongings Checklist Done and Signed by Patient: Yes    Medications sent with patient: None    No covid test ordered.    Additional Information: Pt appears relaxed now, but earlier, prior to Zyprexa, pt needed a 1:1 d/t constantly trying to get out of bed and pulling off cardiac leads. Pt was originally a stroke code, but pt did not have a stroke.    Bar Moffett RN 12/3/2024 6:53 PM

## 2024-12-04 NOTE — PLAN OF CARE
Problem: Adult Inpatient Plan of Care  Goal: Optimal Comfort and Wellbeing  Outcome: Progressing     Problem: Fall Injury Risk  Goal: Absence of Fall and Fall-Related Injury  Outcome: Progressing  Intervention: Identify and Manage Contributors  Recent Flowsheet Documentation  Taken 12/4/2024 0000 by Miles Wylie RN  Medication Review/Management: medications reviewed  Intervention: Promote Injury-Free Environment  Recent Flowsheet Documentation  Taken 12/4/2024 0000 by Miles Wylie RN  Safety Promotion/Fall Prevention:   activity supervised   assistive device/personal items within reach   bedside attendant   clutter free environment maintained   Goal Outcome Evaluation:  A&O to self. Pt restless, agitated and uncooperative early in the night. PRN zyprexa was given. Pt didn't fall asleep until 0415. Purewick in place. VSS on room air. 1:1 for safety.

## 2024-12-04 NOTE — PROGRESS NOTES
Bedside EEG was performed that included photic, auditory, and sensory stimulation. Hyperventilation was not possible given the patient's clinical state.   Skin intact.  EEG #     [id number] EEG system used.    Neurology dictation to follow up.

## 2024-12-04 NOTE — PLAN OF CARE
Problem: Stroke, Ischemic (Includes Transient Ischemic Attack)  Goal: Optimal Coping  Outcome: Progressing     Problem: Adult Inpatient Plan of Care  Goal: Optimal Comfort and Wellbeing  Outcome: Progressing     Problem: Stroke, Ischemic (Includes Transient Ischemic Attack)  Goal: Optimal Functional Ability  Outcome: Progressing  Intervention: Optimize Functional Ability  Recent Flowsheet Documentation  Taken 12/4/2024 0825 by Naima Cramer RN  Activity Management:   activity adjusted per tolerance   up in chair     /67 (BP Location: Left arm)   Pulse 58   Temp 97.3  F (36.3  C) (Axillary)   Resp 20   Wt 53.9 kg (118 lb 12.8 oz)   SpO2 99%   BMI 21.73 kg/m      Pt shows no signs of pain.  Assist of 2 with pivot. MRI completed this shift. EEG completed. NIH score 21 and 16. Pt does not follow commands. Daughters are at bedside. Speech is garbled and incoherent.  Visual cut noted on right side. However pt does track around the room.  Right sided weakness noted. Speech assessment NPO except for meds when alert. Plan for ECHO today. Plan of care ongoing.

## 2024-12-04 NOTE — CONSULTS
NEUROLOGY INPATIENT CONSULTATION NOTE       [facility], [place]  [address] [place] [address]  Tel: [phone]  Fax: [phone]  [email or web address]     Mariela Ortiz,  1933, MRN [id number]  PCP: [facility]  Date: 2024     ASSESSMENT & PLAN     Diagnosis code: Strokelike symptom    Left putamen & corona radiata infarction  89-year-old female with HTN, CLL, hypothyroidism admitted with right-sided weakness, confusion.  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  Patient also has high-grade stenosis in the right ICA  MRI brain shows generalized atrophy but no acute infarct  Dual antiplatelet therapy with baby aspirin and Plavix  Check lipid panel and start Lipitor 40 mg daily  Repeat MRI scan shows infarct in the left putamen and corona radiata.  Left MCA stenosis and multifocal basilar artery stenosis noted on MRA  Echocardiogram  EEG  Troponin borderline elevated at 20 check hemoglobin A1c  Physical, occupational and speech therapy consult  Thank you again for this referral, please feel free to contact me if you have any questions.    Alexis Coleman MD  [facility], [place]     CHIEF COMPLAINT Altered mental status, unspecified altered mental status type     HISTORY OF PRESENT ILLNESS     We have been requested by Dr. De Los Santos to evaluate Mariela Ortiz who is a 89 year old  female for CVA    Patient is a 89-year-old female with history of HTN, CLL, hypothyroidism brought to the emergency room with complaint of altered mental status and right-sided weakness.  According to daughter she was in her usual state of health until day before yesterday.  When she returned from work she noticed patient was sitting and  staring.  She was able to get up and walk with the p daughter but was not responding and staring.  She not have any focal weakness but daughter thought there was some facial droop on the right side and her speech was more slurred.  She was brought to the emergency room and had a CT of the head and CTA head and neck that showed no acute infarct but patient had distal basilar artery occlusion with high-grade right ICA stenosis..  MRI brain was done that did not show any acute abnormality.  There was generalized brain atrophy.  Although patient has significant intracranial atherosclerotic disease, daughter reports that patient was not taking aspirin or any statin     PROBLEM LIST      Patient Active Problem List   Diagnosis    Hypothyroidism    GERD (gastroesophageal reflux disease)    Chronic lymphocytic leukemia (H)    Spinal stenosis    Type 2 diabetes mellitus without complication (H)    Hyperlipidemia LDL goal <100    Right knee pain    Low back pain    AC (acromioclavicular) arthritis    Arthritis of shoulder    Hypothyroidism due to acquired atrophy of thyroid    Alcohol use    Closed fracture of part of neck of femur (H)    Dementia (H)    Disequilibrium    Dyspepsia    Fecal urgency    Melanoma of skin (H)    Osteoarthritis of knees, bilateral    Cystocele    Sensorineural hearing loss    Vaginal vault prolapse after hysterectomy    Benign paroxysmal positional vertigo due to bilateral vestibular disorder    Benign essential hypertension    Anorexia    Confusion    Chronic diarrhea    General weakness    Confusion associated with infection    Acute cystitis without hematuria    COVID-19    Acute encephalopathy    Altered mental status, unspecified altered mental status type      Clinically Significant Risk Factors Present on Admission                     # Hypertension: Noted on problem list         # Dementia: noted on problem list                    PAST MEDICAL & SURGICAL HISTORY     Past Medical History:  Patient  has a past medical history of Melanoma (H).    Past Surgical History: She  has a past surgical history that includes joint replacemtn, knee rt/lt (2002); joint replacement, hip rt/lt (fall 2002); appendectomy; cholecystectomy, laporoscopic; and hysterectomy, pap no longer indicated (1981).     SOCIAL HISTORY     Reviewed, and she  reports that she has quit smoking. She started smoking about 22 years ago. She has never used smokeless tobacco. She reports current alcohol use. She reports that she does not use drugs.     FAMILY HISTORY     Reviewed, and family history includes Diabetes in her daughter.     ALLERGIES     Allergies   Allergen Reactions    Morphine Rash and Unknown     Red line went up her arm when given  Other reaction(s): Erythema  Red line went up her arm when given      Tramadol      agitation    Oxycodone-Acetaminophen Rash        REVIEW OF SYSTEMS     Pertinent items are noted in HPI.     HOME & HOSPITAL MEDICATIONS     Prior to Admission Medications  Medications Prior to Admission   Medication Sig Dispense Refill Last Dose/Taking    amoxicillin (AMOXIL) 500 MG tablet Take 4 tablets by mouth once. 1 hour prior to dental appointment   Taking    levothyroxine (SYNTHROID/LEVOTHROID) 100 MCG tablet Take 100 mcg by mouth every morning.   12/3/2024 Morning    lisinopril (ZESTRIL) 5 MG tablet Take 5 mg by mouth every morning.   12/3/2024 Morning    loperamide (IMODIUM) 2 MG capsule Take 2 mg by mouth daily as needed for diarrhea   Taking As Needed    vitamin B-12 (CYANOCOBALAMIN) 1000 MCG tablet Take 1 tablet by mouth every morning.   12/3/2024 Morning       Hospital Medications  Current Facility-Administered Medications   Medication Dose Route Frequency Provider Last Rate Last Admin    enoxaparin ANTICOAGULANT (LOVENOX) injection 40 mg  40 mg Subcutaneous Q24H Jenelle Ho MD   40 mg at 12/03/24 2132    levothyroxine (SYNTHROID/LEVOTHROID) tablet 100 mcg  100 mcg Oral QAM AC Jenelle Ho MD    "     lisinopril (ZESTRIL) tablet 5 mg  5 mg Oral QAM Jenelle Ho MD        sodium chloride (PF) 0.9% PF flush 3 mL  3 mL Intracatheter Q8H Jenelle Ho MD   3 mL at 12/03/24 2127        PHYSICAL EXAM     Vital signs  Temp:  [97.5  F (36.4  C)] 97.5  F (36.4  C)  Pulse:  [55-95] 55  Resp:  [18-42] 18  BP: (122-180)/(57-91) 126/59  SpO2:  [95 %-100 %] 96 %    General Physical Exam: Patient is alert and oriented x 1. Vital signs were reviewed and are documented in EMR. Neck was supple, no carotid bruit, thyromegaly, JVD or lymphadenopathy noted.  Neurological Exam:  Patient is alert and oriented x 1 speech is slurred.  She has left gaze preference.  Pupil 2 mm minimally reactive she has decreased right nasolabial fold and intermittently responds to question.  She moves left side more briskly compared to right grimaces to painful stimuli in all 4 extremities.  Reflexes absent toes equivocal.  Did not cooperate for cerebellar testing     DIAGNOSTIC STUDIES     Pertinent Radiology   Radiology Results: Reviewed impression and images     CT  HEAD CT:  1.  No evidence for acute infarct, mass or hemorrhage. Atrophy and chronic vascular changes. No \"dense vessel\" sign to strongly suggest acute intraluminal thrombus     HEAD CTA:   1.  Extensive, multifocal intracranial stenoses which are likely atherosclerotic.     2.  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 Correlate with any signs or symptoms of posterior fossa ischemia and consider further evaluation with MRI with diffusion. No definite acute intraluminal thrombus/meniscus sign or dense   vessel on the conventional CT.     3.  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 Correlate clinically.     NECK CTA:  1.  High-grade stenosis at right ICA origin     2.  Mild stenosis at left ICA origin.     3.   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    MRI  1.  No acute intracranial process.  2.  " Generalized brain atrophy and presumed microvascular ischemic changes as detailed above.    Pertinent Labs   Lab Results: Personally Reviewed   Recent Results (from the past 24 hours)   Basic metabolic panel    Collection Time: 12/03/24 12:42 PM   Result Value Ref Range    Sodium 138 135 - 145 mmol/L    Potassium 4.0 3.4 - 5.3 mmol/L    Chloride 106 98 - 107 mmol/L    Carbon Dioxide (CO2) 24 22 - 29 mmol/L    Anion Gap 8 7 - 15 mmol/L    Urea Nitrogen 6.3 (L) 8.0 - 23.0 mg/dL    Creatinine 0.75 0.51 - 0.95 mg/dL    GFR Estimate 75 >60 mL/min/1.73m2    Calcium 8.9 8.8 - 10.4 mg/dL    Glucose 128 (H) 70 - 99 mg/dL   INR    Collection Time: 12/03/24 12:42 PM   Result Value Ref Range    INR 0.98 0.85 - 1.15   Partial thromboplastin time    Collection Time: 12/03/24 12:42 PM   Result Value Ref Range    aPTT 26 22 - 38 Seconds   Troponin T, High Sensitivity    Collection Time: 12/03/24 12:42 PM   Result Value Ref Range    Troponin T, High Sensitivity 16 (H) <=14 ng/L   CBC with platelets and differential    Collection Time: 12/03/24 12:42 PM   Result Value Ref Range    WBC Count 16.5 (H) 4.0 - 11.0 10e3/uL    RBC Count 3.98 3.80 - 5.20 10e6/uL    Hemoglobin 12.5 11.7 - 15.7 g/dL    Hematocrit 38.9 35.0 - 47.0 %    MCV 98 78 - 100 fL    MCH 31.4 26.5 - 33.0 pg    MCHC 32.1 31.5 - 36.5 g/dL    RDW 13.3 10.0 - 15.0 %    Platelet Count 159 150 - 450 10e3/uL    % Neutrophils 23 %    % Lymphocytes 73 %    % Monocytes 3 %    % Eosinophils 0 %    % Basophils 0 %    % Immature Granulocytes 0 %    NRBCs per 100 WBC 0 <1 /100    Absolute Neutrophils 3.8 1.6 - 8.3 10e3/uL    Absolute Lymphocytes 12.1 (H) 0.8 - 5.3 10e3/uL    Absolute Monocytes 0.5 0.0 - 1.3 10e3/uL    Absolute Eosinophils 0.0 0.0 - 0.7 10e3/uL    Absolute Basophils 0.1 0.0 - 0.2 10e3/uL    Absolute Immature Granulocytes 0.0 <=0.4 10e3/uL    Absolute NRBCs 0.0 10e3/uL   Extra Red Top Tube    Collection Time: 12/03/24 12:42 PM   Result Value Ref Range    Hold Specimen  [facility]    RBC and Platelet Morphology    Collection Time: 12/03/24 12:42 PM   Result Value Ref Range    RBC Morphology Confirmed RBC Indices     Platelet Assessment  Automated Count Confirmed. Platelet morphology is normal.     Automated Count Confirmed. Platelet morphology is normal.    Smudge Cells Present (A) None Seen   Hepatic function panel    Collection Time: 12/03/24 12:42 PM   Result Value Ref Range    Protein Total 6.1 (L) 6.4 - 8.3 g/dL    Albumin 4.1 3.5 - 5.2 g/dL    Bilirubin Total 0.5 <=1.2 mg/dL    Alkaline Phosphatase 99 40 - 150 U/L    AST 17 0 - 45 U/L    ALT 26 0 - 50 U/L    Bilirubin Direct <0.20 0.00 - 0.30 mg/dL   Magnesium    Collection Time: 12/03/24 12:42 PM   Result Value Ref Range    Magnesium 2.1 1.7 - 2.3 mg/dL   UA with Microscopic reflex to Culture    Collection Time: 12/03/24  3:12 PM    Specimen: Urine, Catheter   Result Value Ref Range    Color Urine Light Yellow Colorless, Straw, Light Yellow, Yellow    Appearance Urine Clear Clear    Glucose Urine Negative Negative mg/dL    Bilirubin Urine Negative Negative    Ketones Urine Negative Negative mg/dL    Specific Gravity Urine 1.026 1.001 - 1.030    Blood Urine 0.03 mg/dL (A) Negative    pH Urine 5.5 5.0 - 7.0    Protein Albumin Urine Negative Negative mg/dL    Urobilinogen Urine <2.0 <2.0 mg/dL    Nitrite Urine Negative Negative    Leukocyte Esterase Urine Negative Negative    Mucus Urine Present (A) None Seen /LPF    RBC Urine 2 <=2 /HPF    WBC Urine 1 <=5 /HPF   Troponin T, High Sensitivity    Collection Time: 12/03/24  5:13 PM   Result Value Ref Range    Troponin T, High Sensitivity 20 (H) <=14 ng/L   TSH with free T4 reflex    Collection Time: 12/03/24  5:13 PM   Result Value Ref Range    TSH 0.97 0.30 - 4.20 uIU/mL   Troponin T, High Sensitivity    Collection Time: 12/04/24 12:00 AM   Result Value Ref Range    Troponin T, High Sensitivity 27 (H) <=14 ng/L       Total time spent for face to face visit, reviewing labs/imaging  studies, counseling and coordination of care was: 1 Hour 30 Minutes More than 50% of this time was spent on counseling and coordination of care.    This note was dictated using voice recognition software.  Any grammatical or context distortions are unintentional and inherent to the software.

## 2024-12-04 NOTE — PROGRESS NOTES
SPEECH PATHOLOGY CLINICAL SWALLOW EVALUATION  SPEECH PATHOLOGY SPEECH/LANGUAGE EVALUATION      12/04/24 1400   Appointment Info   Signing Clinician's Name / Credentials (SLP) Paul Coronel MA [facility] SLP   General Information   Onset of Illness/Injury or Date of Surgery 12/03/24   Referring Physician Ilene De Los Santos MD   Patient/Family Therapy Goal Statement (SLP) determine ability to eat/drink   Pertinent History of Current Problem 89 year old female with hypertension, hypothyroidism, low-grade CLL, baseline mild cognitive impairment with sundowning here for evaluation of acute onset altered mental status and right facial droop.  Fluctuating symptoms since arrival, seems much worse this morning. Hospital Day: 2. Right facial droop, not using RUE or RLE, garbled speech.  MRI 12/4: 1.  There are a few small acute infarcts within the upper left putamen and overlying corona radiata.   2.  No hemorrhage.   General Observations Pt is awake, left gaze preferences, speech is unintelligible. He daughter, Stephanie, is present. She is also on 1:1 with nursing.   Type of Evaluation   Type of Evaluation Swallow Evaluation;Speech, Language, Cognition   Oral Motor   Oral Musculature generally intact   Mucosal Quality adequate   Dentition (Oral Motor)   Comment, Dentition (Oral Motor) Has upper dentures in but not secure, removed for evaluation.   Dentition (Oral Motor) dental appliance/dentures   Dental Appliance/Denture (Oral Motor) dentures, full   Facial Symmetry (Oral Motor)   Facial Symmetry (Oral Motor) right side impairment   Comment, Facial Symmetry (Oral Motor) facial droop, flattened right nasolabial fold   Right Side Facial Asymmetry moderate impairment   Lip Function (Oral Motor)   Lip Range of Motion (Oral Motor) unable/difficult to assess   Lip Strength (Oral Motor) unable/difficult to assess   Tongue Function (Oral Motor)   Tongue Strength (Oral Motor) unable/difficult to assess   Tongue Coordination/Speed (Oral  Motor) unable/difficult to assess   Tongue ROM (Oral Motor) unable/difficult to assess   Cough/Swallow/Gag Reflex (Oral Motor)   Volitional Throat Clear/Cough (Oral Motor) not tested   Volitional Swallow (Oral Motor) unable/difficult to assess   Vocal Quality/Secretion Management (Oral Motor)   Vocal Quality (Oral Motor) wet/gurgly   General Swallowing Observations   Past History of Dysphagia no known oral pharyngeal dysphagia   Current Diet/Method of Nutritional Intake (General Swallowing Observations, NIS) NPO   Swallowing Evaluation Clinical swallow evaluation   Clinical Swallow Evaluation   Feeding Assistance dependent   Clinical Swallow Evaluation Textures Trialed thin liquids;pureed   Clinical Swallow Eval: Thin Liquid Texture Trial   Mode of Presentation, Thin Liquids spoon;cup   Volume of Liquid or Food Presented single ice chips, sip from cup and spoon   Oral Phase of Swallow residue in oral cavity;delayed AP movement   Oral Residue right lip drooling   Pharyngeal Phase of Swallow no awareness of problems;reduction in laryngeal movement;repeated swallows;wet vocal quality after swallow;other (see comments);impaired  (audible, appears delayed)   Diagnostic Statement resistive to accepting spoon or ice, when assisted to hold cup she did bring it to her mouth with adequate lip to cup placement, poor oral control, suspect premature spill, delayed swallow, disorganized.   Clinical Swallow Evaluation: Puree Solid Texture Trial   Mode of Presentation, Puree spoon;fed by clinician   Volume of Puree Presented 3 bites   Oral Phase, Puree delayed AP movement;effortful AP movement;residue in oral cavity   Oral Residue, Puree right anterior lateral sulci   Pharyngeal Phase, Puree impaired;reduction in laryngeal movement;repeated swallows;wet vocal quality after swallow   Diagnostic Statement varies from disorganized oral movement/control to no awareness or attempts to manipulate bolus   Swallowing Recommendations   Diet  Consistency Recommendations NPO   Instrumental Assessment Recommendations VFSS (videofluoroscopic swallowing study)   Comment, Swallowing Recommendations Recommend NPO, video swallow only when alert and engaged, may need to consider alternative means of nutrition for nutrition and medications   Motor Speech   Speech Intelligibility (Motor Speech) word level;conversational level   Comment, Motor Speech Assessment Speech is unintelligible, no oberved spontanous intelligilbe words or phrases. Unable to repeat words.   Word Level, Speech Intelligibility (Motor Speech) impaired;severe impairment   Auditory Comprehension   Follows Commands (Auditory Comprehension) 1-step command   Comment, Assessment (Auditory Comprehension) No observed comprehension. Unable to participate in structured tasks or follow commands.   Yes/No Questions (Auditory Comprehension) biographical/personal questions   1 Step, Follows Commands (Auditory Comprehension) impaired;0-24% accuracy   Biographical/Personal Questions (Auditory Comprehension) impaired;0-24% accuracy   Verbal Expression   Comment, Assessment (Verbal Expression) Severe impairment, garbled spontaneous speech, no obvious use of gestures other than pushing my hand or body away when attempting to provide food/liquid.   Pragmatic Language   Comment, Assessment (Pragmatic Language) smiling at times, left gaze preference, needs cues to turn head to right.   Clinical Impression   Criteria for Skilled Therapeutic Interventions Met (SLP Eval) Yes, treatment indicated   SLP Diagnosis Dysphagia, aphasia   Activity Limitations Related to Problem List (SLP) unable to take PO for nutrition, unable to communicate needs   Risks & Benefits of therapy have been explained evaluation/treatment results reviewed;care plan/treatment goals reviewed;risks/benefits reviewed;current/potential barriers reviewed;participants voiced agreement with care plan;daughter   Clinical Impression Comments Pt is awake,  garbled speech but attempting to talk. Unable to follow commands with auditory or visual and tactile cues. Left gaze preference. She is resistive at times to spoon or cup being placed at her mouth. Pushed me away at one point. She smiles and laughs at times. Her daughter and RN also attempted to feed or assist her. She has poor awareness of the environment and what to do when presented with food/drink. She spit out ice chip, showed poor oral control with right side oral loss with water by cup and spoon. Poor control and at times no attempts to manipulate puree texture (tried apple sauce and pudding which she likes). AP transit is delayed or absent. Suspect premature spill over the tongue back. Swallow is significantly delayed or absent, appears uncoordinated. Audible swallows at times with reduced laryngeal movement to observation. No cough but has ongoing wet vocal quality after trials. No attempts to cough or throat clear. She is unable to follow directions. At this time swallow is disorganized and not safe or efficient for PO intake.   SLP Total Evaluation Time   Eval: oral/pharyngeal swallow function, clinical swallow Minutes (06712) 15   Eval: Sound production with lang comprehension and expression Minutes (10688) 10   SLP Goals   Therapy Frequency (SLP Eval) 5 times/week   SLP Predicted Duration/Target Date for Goal Attainment 12/13/24   SLP Goals Swallow;Language Comprehension;Communication   SLP: Safely tolerate diet without signs/symptoms of aspiration One P.O. texture;With use of swallow precautions;With use of compensatory swallow strategies   SLP: Improve language comprehension for interaction with caregivers/environment with visual and/or verbal support;maximum assist   SLP: Communicate basic wants and needs maximum assist;verbally;using gestures   SLP Discharge Planning   SLP Plan Thur 0/5; VSS only if maintaining alertness, may need alt nutrition, very basic a/c and v/e tasks   SLP Discharge  Recommendation Transitional Care Facility   SLP Rationale for DC Rec unable to safely take PO or communicate needs, fully dependent at this time.   SLP Brief overview of current status  Recommend NPO, video swallow only when alert and engaged, may need to consider alternative means of nutrition for nutrition and medications. Severe communication deficits, unable to follow commands or communicate needs.   SLP Time and Intention   Total Session Time (sum of timed and untimed services) 25

## 2024-12-04 NOTE — PROGRESS NOTES
NEUROLOGY INPATIENT PROGRESS NOTE       [facility] NEUROLOGY [place]  [address] [place] [address]  Tel: [phone]  Fax: [phone]  [email or web address]     ASSESSMENT & PLAN   Date: 12/05/2024  Hospital Day#: 2  Visit diagnosis: Subcortical infarction    Left putamen & corona radiata infarction  89-year-old female with HTN, CLL, hypothyroidism admitted with right-sided weakness, confusion.  Initial MRI scan was negative but subsequent MRI confirmed subcortical infarct  CT, CTA head and neck showed generalized atrophy with extensive multifocal intracranial atherosclerotic disease with possible mid to distal basilar artery occlusion.  Additionally high-grade stenosis of the right ICA  Initial MRI brain showed generalized atrophy but subsequent MRI scan showed infarct in the left putamen and corona radiata.  Left MCA stenosis and multifocal basilar artery stenosis noted on MRA.  Diffuse intracranial atherosclerosis he requires maximal medical treatment, no acute intervention indicated  Dual antiplatelet therapy with baby aspirin and Plavix for 90 days afterwards switch to enteric-coated aspirin 325 mg daily  Lipid panel shows an LDL of 110.  Started on Lipitor 40 mg daily with goal of LDL less than 70  Echocardiogram done yesterday, result pending  Telemetry monitoring.  Physical, occupational and speech therapy consult appreciated.  They recommend transitional care    Neurology Discharge Planning :   JUANA Coleman MD  [facility]     PROBLEM LIST      Patient Active Problem List   Diagnosis    Hypothyroidism    GERD (gastroesophageal reflux disease)    Chronic lymphocytic leukemia (H)    Spinal stenosis    Type 2 diabetes mellitus without complication (H)    Hyperlipidemia LDL goal <100    Right knee pain    Low back pain    AC (acromioclavicular) arthritis    Arthritis of shoulder    Hypothyroidism due to acquired atrophy of thyroid    Alcohol use     Closed fracture of part of neck of femur (H)    Dementia (H)    Disequilibrium    Dyspepsia    Fecal urgency    Melanoma of skin (H)    Osteoarthritis of knees, bilateral    Cystocele    Sensorineural hearing loss    Vaginal vault prolapse after hysterectomy    Benign paroxysmal positional vertigo due to bilateral vestibular disorder    Benign essential hypertension    Anorexia    Confusion    Chronic diarrhea    General weakness    Confusion associated with infection    Acute cystitis without hematuria    COVID-19    Acute encephalopathy    Altered mental status, unspecified altered mental status type    Acute embolic strokes involving left middle cerebral artery (H)    TMJ syndrome       Past Medical History:   Patient  has a past medical history of Melanoma (H).     SUBJECTIVE     Patient alert but lethargic.  Continues to have significant right-sided facial droop and weakness     OBJECTIVE     Vital signs in last 24 hours  Temp:  [97.2  F (36.2  C)-99.2  F (37.3  C)] 99.2  F (37.3  C)  Pulse:  [58-92] 65  Resp:  [17-20] 20  BP: (109-152)/(49-67) 134/67  SpO2:  [95 %-98 %] 98 %    Review of Systems   Pertinent items are noted in HPI.    General Physical Exam: Patient is alert and oriented x 1. Vital signs were reviewed and are documented in EMR. Neck was supple, no carotid bruit, thyromegaly, JVD or lymphadenopathy noted.  Neurological Exam:  Patient is alert and oriented x 1 speech is slurred.  She has left gaze preference.  Pupil 2 mm minimally reactive she has decreased right nasolabial fold and intermittently responds to question.  She moves left side more briskly compared to right grimaces to painful stimuli in all 4 extremities.  Reflexes absent toes equivocal.  Did not cooperate for cerebellar testing     DIAGNOSTIC STUDIES     Pertinent Radiology   Following imaging studies were reviewed:    CT  HEAD CT:  1.  No evidence for acute infarct, mass or hemorrhage. Atrophy and chronic vascular changes. No  "\"dense vessel\" sign to strongly suggest acute intraluminal thrombus     HEAD CTA:   1.  Extensive, multifocal intracranial stenoses which are likely atherosclerotic.     2.  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 Correlate with any signs or symptoms of posterior fossa ischemia and consider further evaluation with MRI with diffusion. No definite acute intraluminal thrombus/meniscus sign or dense   vessel on the conventional CT.     3.  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 Correlate clinically.     NECK CTA:  1.  High-grade stenosis at right ICA origin     2.  Mild stenosis at left ICA origin.     3.   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     MRI  12/4/2024  HEAD MRI:  1.  There are a few small acute infarcts within the upper left putamen and overlying corona radiata.   2.  No hemorrhage.     HEAD MRA:  1.  Markedly diminished flow related enhancement within the proximal left middle cerebral artery at least in part due to severe proximal stenosis and motion artifact. Superimposed vessel thrombus or occlusion cannot be excluded on this exam. Follow-up   CTA may be useful given the infarcts in this territory on diffusion imaging.  2.  Intracranial atherosclerosis elsewhere including severe multifocal basilar artery stenosis was better demonstrated on the CTA.    12/3/2024  1.  No acute intracranial process.  2.  Generalized brain atrophy and presumed microvascular ischemic changes as detailed above.    EEG  This is an abnormal EEG due to diffusely slow background in theta and delta range that suggests a nonspecific generalized cerebral dysfunction. Such slowing can be seen in metabolic or toxic abnormalities, clinical correlation is recommended. No sharp discharges or spikes were recorded during this recording to suggest a seizure disorder.      Echocardiogram  Pending    Pertinent Labs   Lab Results: Personally Reviewed  Recent " Results (from the past 24 hours)   Glucose by meter    Collection Time: 12/04/24  9:16 AM   Result Value Ref Range    GLUCOSE BY METER POCT 104 (H) 70 - 99 mg/dL   Prolactin    Collection Time: 12/04/24  9:39 AM   Result Value Ref Range    Prolactin 14 5 - 23 ng/mL   Hemoglobin A1c    Collection Time: 12/04/24  2:02 PM   Result Value Ref Range    Estimated Average Glucose 97 <117 mg/dL    Hemoglobin A1C 5.0 <5.7 %   Glucose by meter    Collection Time: 12/04/24  5:46 PM   Result Value Ref Range    GLUCOSE BY METER POCT 115 (H) 70 - 99 mg/dL   Glucose by meter    Collection Time: 12/04/24  8:59 PM   Result Value Ref Range    GLUCOSE BY METER POCT 103 (H) 70 - 99 mg/dL   Glucose by meter    Collection Time: 12/05/24 12:18 AM   Result Value Ref Range    GLUCOSE BY METER POCT 134 (H) 70 - 99 mg/dL   Glucose by meter    Collection Time: 12/05/24  6:00 AM   Result Value Ref Range    GLUCOSE BY METER POCT 103 (H) 70 - 99 mg/dL   Basic metabolic panel    Collection Time: 12/05/24  6:33 AM   Result Value Ref Range    Sodium 141 135 - 145 mmol/L    Potassium 3.5 3.4 - 5.3 mmol/L    Chloride 107 98 - 107 mmol/L    Carbon Dioxide (CO2) 21 (L) 22 - 29 mmol/L    Anion Gap 13 7 - 15 mmol/L    Urea Nitrogen 7.3 (L) 8.0 - 23.0 mg/dL    Creatinine 0.66 0.51 - 0.95 mg/dL    GFR Estimate 82 >60 mL/min/1.73m2    Calcium 8.6 (L) 8.8 - 10.4 mg/dL    Glucose 90 70 - 99 mg/dL         HOSPITAL MEDICATIONS     Current Facility-Administered Medications   Medication Dose Route Frequency Provider Last Rate Last Admin    aspirin (ASA) chewable tablet 81 mg  81 mg Oral Daily Alexis Coleman MD   81 mg at 12/04/24 1521    atorvastatin (LIPITOR) tablet 40 mg  40 mg Oral QPM Alexis Coleman MD        clopidogrel (PLAVIX) tablet 75 mg  75 mg Oral Daily Alexis Coleman MD   75 mg at 12/04/24 1521    enoxaparin ANTICOAGULANT (LOVENOX) injection 40 mg  40 mg Subcutaneous Q24H Jenelle Ho MD   40 mg at 12/04/24 2049    [Held by provider] levothyroxine  (SYNTHROID/LEVOTHROID) tablet 100 mcg  100 mcg Oral QAM AC Jenelle Ho MD        [Held by provider] lisinopril (ZESTRIL) tablet 5 mg  5 mg Oral QAM Jenelle Ho MD        melatonin tablet 3 mg  3 mg Oral Daily with supper Ilene De Los Santos MD   3 mg at 12/04/24 1725    sodium chloride (PF) 0.9% PF flush 3 mL  3 mL Intracatheter Q8H Jenelle Ho MD   3 mL at 12/05/24 0330        Total time spent for face to face visit, reviewing labs/imaging studies, counseling and coordination of care was: 45 Minutes More than 50% of this time was spent on counseling and coordination of care.      This note was dictated using voice recognition software.  Any grammatical or context distortions are unintentional and inherent to the software.

## 2024-12-04 NOTE — PLAN OF CARE
Goal Outcome Evaluation:    Writer 1:1 w/pt since around 2000. Pt confused, restless in bed. Re-directable at times w/ encouragement. Tele in place. Pt prefers to wear no gown. Incontinent of urine, pure wick in place. LR running continuous at 75mL/hr pt takes pills w/ vanilla ice cream or pudding. Hypertensive, PRN IV Hydralazine given per order.      Problem: Adult Inpatient Plan of Care  Goal: Plan of Care Review  Description: The Plan of Care Review/Shift note should be completed every shift.  The Outcome Evaluation is a brief statement about your assessment that the patient is improving, declining, or no change.  This information will be displayed automatically on your shift  note.  Outcome: Progressing  Flowsheets (Taken 12/3/2024 2246)  Plan of Care Reviewed With:   patient   family  Overall Patient Progress: improving    Problem: Adult Inpatient Plan of Care  Goal: Absence of Hospital-Acquired Illness or Injury  Intervention: Identify and Manage Fall Risk  Recent Flowsheet Documentation  Taken 12/3/2024 2030 by Sonal Gillespie RN  Safety Promotion/Fall Prevention:   activity supervised   bedside attendant     Problem: Adult Inpatient Plan of Care  Goal: Absence of Hospital-Acquired Illness or Injury  Intervention: Prevent Skin Injury  Recent Flowsheet Documentation  Taken 12/3/2024 2215 by Sonal Gillespie RN  Body Position:   legs elevated   heels elevated   foot of bed elevated   log-rolled   supine  Taken 12/3/2024 2030 by Sonal Gillespie RN  Body Position: log-rolled     Problem: Adult Inpatient Plan of Care  Goal: Absence of Hospital-Acquired Illness or Injury  Intervention: Prevent and Manage VTE (Venous Thromboembolism) Risk  Recent Flowsheet Documentation  Taken 12/3/2024 2030 by Sonal Gillespie RN  VTE Prevention/Management: (lovenox SQ) SCDs off (sequential compression devices)     Problem: Adult Inpatient Plan of Care  Goal: Optimal Comfort and Wellbeing  Outcome: Progressing             Plan of Care Reviewed With: patient, family    Overall Patient Progress: improvingOverall Patient Progress: improving

## 2024-12-04 NOTE — PHARMACY-CONSULT NOTE
Pharmacy Consult to evaluate for medication related stroke core measures    Mariela BURAK Ortiz, 89 year old female admitted for AMS on 12/3/2024.    Thrombolytic was not given because of Time from onset contraindications    VTE Prophylaxis Enoxaparin given on 12/3 as appropriate prior to end of hospital day 2.    Antithrombotic: aspirin started on 12/3, as appropriate by end of hospital day 2. Continue antithrombotic therapy on discharge to meet quality measures, unless contraindicated.    Anticoagulation if history of A-fib/flutter: Patient does not have history of A-fib/flutter - anticoagulation not required for medication related stroke core measures.     LDL Cholesterol Calculated   Date Value Ref Range Status   12/04/2024 110 (H) <100 mg/dL Final   12/14/2015 84 <100 mg/dL Final     Comment:     Desirable:       <100 mg/dl       Patient currently receiving Lipitor (atorvastatin) continue statin on discharge to meet quality measures, unless contraindicated.    Recommendations: None at this time    Thank you for the consult.    Marcella Saba, PharmD, BCPS 12/04/24 1:58 PM

## 2024-12-04 NOTE — PROGRESS NOTES
[facility]    Medicine Progress Note - Hospitalist Service    Date of Admission:  12/3/2024    Assessment & Plan                Mariela Oritz is a 89 year old female with hypertension, hypothyroidism, low-grade CLL, baseline mild cognitive impairment with sundowning here for evaluation of acute onset altered mental status and right facial droop.  Fluctuating symptoms since arrival, seems much worse this morning. Hospital Day: 2      Acute strokes L MCA territory  -Her last known well was the day before at 5 PM. She found confused when woke up the next morning at 10 AM.  Initially with concern for right facial droop, symptoms fluctuating since arrival today more overt right sided weakness.  -CTA head showed extensive multifocal intracranial stenoses  -Initial MRI was motion degraded, no acute infarct seen.   -Repeat MRI 12/4 showed a few small acute infarcts in the left putamen and corona radiata, concern for markedly diminished flow in the proximal left MCA  - Telemetry  - Neuro checks q4h  -started on aspirin, Plavix, atorvastatin  -Permissive hypertension  -PT, OT, speech  -IVF for now while NPO  -Neurology following.  Defer to neurology if any intervention or further workup needed for the concern of possible L MCA near-occlusion    Acute metabolic encephalopathy  Underlying mild cognitive impairment  - Supportive care with melatonin at bedtime, seroquel prn  - Family concerns patient is dehydrated. continue gentle IVF.     Mildly elevated troponin:  Troponin borderline elevated but uptrending 16-->20-->27  - Telemetry  -Recheck troponin in the morning     CLL: Mild lymphocytosis at baseline, seems stable from historic counts.     Essential hypertension: Hold home lisinopril for permissive hypertension     Hypothyroidism: TSH 0.97 and downtrending from 8 months ago. Consider slight dose reduction but would do as outpatient when well.     Hyperlipidemia: , started on atorvastatin  as above.  Would recheck lipids in 1 to 3 months.          Diet: Regular Diet Adult    DVT Prophylaxis: Moderate risk.   Enoxaparin (Lovenox) SQ  Zaman Catheter: Not present  Lines: None     Cardiac Monitoring: ACTIVE order. Indication: altered mental status  Code Status: No CPR- Do NOT Intubate      Clinically Significant Risk Factors Present on Admission                   # Hypertension: Noted on problem list     # Dementia: noted on problem list                  Disposition Plan     Medically Ready for Discharge: Anticipated in 2-4 Days         Discharge barrier(s): AMS, worsening stroke symptoms  Care discussed with: RN, family      Ilene De Los Santos MD  Hospitalist Service  [facility]  Securely message with [employer] (more info)  Text page via [employer] Paging/Directory   ______________________________________________________________________      Physical Exam   Vital Signs: Temp: 97.3  F (36.3  C) Temp src: Axillary BP: 137/67 Pulse: 58   Resp: 20 SpO2: 99 % O2 Device: None (Room air)    Weight: 118 lbs 12.8 oz    General: drowsy elderly female sitting in bedside chair with her head falling to the side.  Significant right facial droop.  Minimal movement of her right arm but she did move it spontaneously a little bit.  No clear spontaneous movement of the RLE.  She was moving the L UE and LLE spontaneously during exam.  One mumbled attempt at speech unintelligible.  HEENT: Head normocephalic atraumatic, oral mucosa moist.  CV: Regular rhythm, normal rate, no murmurs  Resp: No wheezes, no rales or rhonchi, no focal consolidations  GI: Belly soft, nondistended, nontender, bowel sounds present  Skin: No rashes or lesions  Extremities: No peripheral edema  Psych: drowsy  Neuro: R facial droop. R sided weakness noted      Medical Decision Making               Data   Recent Results (from the past 16 hours)   Troponin T, High Sensitivity    Collection Time: 12/04/24 12:00 AM   Result Value Ref Range     Troponin T, High Sensitivity 27 (H) <=14 ng/L   Lipid Profile    Collection Time: 12/04/24 12:00 AM   Result Value Ref Range    Cholesterol 190 <200 mg/dL    Triglycerides 139 <150 mg/dL    Direct Measure HDL 52 >=50 mg/dL    LDL Cholesterol Calculated 110 (H) <100 mg/dL    Non HDL Cholesterol 138 (H) <130 mg/dL   Glucose by meter    Collection Time: 12/04/24  9:16 AM   Result Value Ref Range    GLUCOSE BY METER POCT 104 (H) 70 - 99 mg/dL   Prolactin    Collection Time: 12/04/24  9:39 AM   Result Value Ref Range    Prolactin 14 5 - 23 ng/mL       Interval History     Patient with recurrent right sided facial droop and altered mental status this morning.  Also not using her right arm or leg as much.  Stat repeat MRI has been ordered.  Premedicate with IV Ativan as she is unlikely to cooperate and previously images were of poor quality.  On IV fluid for hydration.  Family members updated at bedside.  Not ready for discharge, likely multiple days.  CODE STATUS confirmed DNR/DNI.

## 2024-12-05 VITALS
DIASTOLIC BLOOD PRESSURE: 79 MMHG | RESPIRATION RATE: 24 BRPM | HEART RATE: 76 BPM | SYSTOLIC BLOOD PRESSURE: 177 MMHG | BODY MASS INDEX: 21.73 KG/M2 | OXYGEN SATURATION: 96 % | WEIGHT: 118.8 LBS | TEMPERATURE: 98.6 F

## 2024-12-05 LAB
ANION GAP SERPL CALCULATED.3IONS-SCNC: 13 MMOL/L (ref 7–15)
BUN SERPL-MCNC: 7.3 MG/DL (ref 8–23)
CALCIUM SERPL-MCNC: 8.6 MG/DL (ref 8.8–10.4)
CHLORIDE SERPL-SCNC: 107 MMOL/L (ref 98–107)
CREAT SERPL-MCNC: 0.66 MG/DL (ref 0.51–0.95)
EGFRCR SERPLBLD CKD-EPI 2021: 82 ML/MIN/1.73M2
GLUCOSE BLDC GLUCOMTR-MCNC: 103 MG/DL (ref 70–99)
GLUCOSE BLDC GLUCOMTR-MCNC: 120 MG/DL (ref 70–99)
GLUCOSE BLDC GLUCOMTR-MCNC: 129 MG/DL (ref 70–99)
GLUCOSE BLDC GLUCOMTR-MCNC: 134 MG/DL (ref 70–99)
GLUCOSE BLDC GLUCOMTR-MCNC: 73 MG/DL (ref 70–99)
GLUCOSE SERPL-MCNC: 90 MG/DL (ref 70–99)
HCO3 SERPL-SCNC: 21 MMOL/L (ref 22–29)
POTASSIUM SERPL-SCNC: 3.5 MMOL/L (ref 3.4–5.3)
SODIUM SERPL-SCNC: 141 MMOL/L (ref 135–145)

## 2024-12-05 PROCEDURE — 120N000001 HC R&B MED SURG/OB

## 2024-12-05 PROCEDURE — 92507 TX SP LANG VOICE COMM INDIV: CPT | Mod: GN

## 2024-12-05 PROCEDURE — 250N000013 HC RX MED GY IP 250 OP 250 PS 637: Performed by: HOSPITALIST

## 2024-12-05 PROCEDURE — 97112 NEUROMUSCULAR REEDUCATION: CPT | Mod: GP

## 2024-12-05 PROCEDURE — 82310 ASSAY OF CALCIUM: CPT | Performed by: HOSPITALIST

## 2024-12-05 PROCEDURE — 99233 SBSQ HOSP IP/OBS HIGH 50: CPT | Performed by: HOSPITALIST

## 2024-12-05 PROCEDURE — 99232 SBSQ HOSP IP/OBS MODERATE 35: CPT | Performed by: PSYCHIATRY & NEUROLOGY

## 2024-12-05 PROCEDURE — 250N000011 HC RX IP 250 OP 636: Performed by: INTERNAL MEDICINE

## 2024-12-05 PROCEDURE — 92526 ORAL FUNCTION THERAPY: CPT | Mod: GN

## 2024-12-05 PROCEDURE — 258N000003 HC RX IP 258 OP 636: Performed by: HOSPITALIST

## 2024-12-05 PROCEDURE — 97161 PT EVAL LOW COMPLEX 20 MIN: CPT | Mod: GP

## 2024-12-05 PROCEDURE — 80048 BASIC METABOLIC PNL TOTAL CA: CPT | Performed by: HOSPITALIST

## 2024-12-05 PROCEDURE — 36415 COLL VENOUS BLD VENIPUNCTURE: CPT | Performed by: HOSPITALIST

## 2024-12-05 RX ORDER — SODIUM CHLORIDE, SODIUM LACTATE, POTASSIUM CHLORIDE, CALCIUM CHLORIDE 600; 310; 30; 20 MG/100ML; MG/100ML; MG/100ML; MG/100ML
INJECTION, SOLUTION INTRAVENOUS CONTINUOUS
Status: DISCONTINUED | OUTPATIENT
Start: 2024-12-05 | End: 2024-12-05

## 2024-12-05 RX ORDER — DEXTROSE MONOHYDRATE AND SODIUM CHLORIDE 5; .9 G/100ML; G/100ML
INJECTION, SOLUTION INTRAVENOUS CONTINUOUS
Status: DISCONTINUED | OUTPATIENT
Start: 2024-12-05 | End: 2024-12-06

## 2024-12-05 RX ORDER — OLANZAPINE 5 MG/1
5 TABLET, ORALLY DISINTEGRATING ORAL DAILY PRN
Status: DISCONTINUED | OUTPATIENT
Start: 2024-12-05 | End: 2024-12-06 | Stop reason: HOSPADM

## 2024-12-05 RX ADMIN — ENOXAPARIN SODIUM 40 MG: 40 INJECTION SUBCUTANEOUS at 20:41

## 2024-12-05 RX ADMIN — DEXTROSE AND SODIUM CHLORIDE: 5; 900 INJECTION, SOLUTION INTRAVENOUS at 11:42

## 2024-12-05 RX ADMIN — OLANZAPINE 5 MG: 5 TABLET, ORALLY DISINTEGRATING ORAL at 18:03

## 2024-12-05 RX ADMIN — SODIUM CHLORIDE, POTASSIUM CHLORIDE, SODIUM LACTATE AND CALCIUM CHLORIDE: 600; 310; 30; 20 INJECTION, SOLUTION INTRAVENOUS at 08:54

## 2024-12-05 ASSESSMENT — ACTIVITIES OF DAILY LIVING (ADL)
ADLS_ACUITY_SCORE: 70
ADLS_ACUITY_SCORE: 70
ADLS_ACUITY_SCORE: 74
ADLS_ACUITY_SCORE: 75
ADLS_ACUITY_SCORE: 79
ADLS_ACUITY_SCORE: 70
ADLS_ACUITY_SCORE: 70
ADLS_ACUITY_SCORE: 79
ADLS_ACUITY_SCORE: 80
ADLS_ACUITY_SCORE: 70
ADLS_ACUITY_SCORE: 79
ADLS_ACUITY_SCORE: 79
DEPENDENT_IADLS:: CLEANING;COOKING;LAUNDRY;SHOPPING;MEAL PREPARATION;MEDICATION MANAGEMENT;TRANSPORTATION
ADLS_ACUITY_SCORE: 75
ADLS_ACUITY_SCORE: 85
ADLS_ACUITY_SCORE: 70
ADLS_ACUITY_SCORE: 79
ADLS_ACUITY_SCORE: 74
ADLS_ACUITY_SCORE: 79
ADLS_ACUITY_SCORE: 79
ADLS_ACUITY_SCORE: 80
ADLS_ACUITY_SCORE: 80

## 2024-12-05 NOTE — PLAN OF CARE
Problem: Adult Inpatient Plan of Care  Goal: Plan of Care Review  Description: The Plan of Care Review/Shift note should be completed every shift.  The Outcome Evaluation is a brief statement about your assessment that the patient is improving, declining, or no change.  This information will be displayed automatically on your shift  note.  Outcome: Progressing     Problem: Adult Inpatient Plan of Care  Goal: Absence of Hospital-Acquired Illness or Injury  Intervention: Identify and Manage Fall Risk  Recent Flowsheet Documentation  Taken 12/5/2024 0900 by Nina Sheets RN  Safety Promotion/Fall Prevention:   activity supervised   clutter free environment maintained   lighting adjusted   patient and family education   room near nurse's station   room organization consistent   safety round/check completed  Taken 12/5/2024 0800 by Nina Sheets RN  Safety Promotion/Fall Prevention:   bedside attendant   clutter free environment maintained   lighting adjusted   nonskid shoes/slippers when out of bed   patient and family education   room door open   room near nurse's station   room organization consistent   safety round/check completed   supervised activity     Problem: Adult Inpatient Plan of Care  Goal: Absence of Hospital-Acquired Illness or Injury  Intervention: Prevent Skin Injury  Recent Flowsheet Documentation  Taken 12/5/2024 1400 by Nina Sheets RN  Body Position:   turned   right  Taken 12/5/2024 1113 by Nina Sheets RN  Body Position:   turned   left  Taken 12/5/2024 0915 by Nina Sheets RN  Body Position:   turned   right  Taken 12/5/2024 0745 by Nina Sheets RN  Body Position:   turned   left     Problem: Adult Inpatient Plan of Care  Goal: Optimal Comfort and Wellbeing  Outcome: Progressing   Goal Outcome Evaluation:    VSS, on RA. Lethargic for most of shift, alert at times. Speech is garbled and intelligible. Pt is calm and seems comfortable. NIHSS scores were 23 and 24. Family at bedside,  wants to do a care conference with care team tomorrow. Care management notified about this, MD also aware. Right sided weakness, right hand more than right leg. Blood sugar at noon was 73; MD notified and D5 in NS ordered. Speech therapy came and saw pt, unable to do a video swallow test. Still NPO for now.

## 2024-12-05 NOTE — PROGRESS NOTES
SPEECH PATHOLOGY PROGRESS NOTE    Patient unable to maintain alertness for PO intake or to participate in video swallow study. She is having difficulty managing secretions, delayed or absent swallow with oral swab followed by increased wet gurgly vocal quality, poor unproductive cough. Questionable potential for swallow recovery given severity of deficits. Unable to follow commands or express needs. SLP will continue to follow as needed pending goals of care.

## 2024-12-05 NOTE — PROGRESS NOTES
[facility]    Medicine Progress Note - Hospitalist Service    Date of Admission:  12/3/2024    Assessment & Plan                Mariela Ortiz is a 89 year old female with hypertension, hypothyroidism, low-grade CLL, baseline mild cognitive impairment with sundowning here for evaluation of acute onset altered mental status and right facial droop.  Fluctuating symptoms since arrival, overall not doing well and is very very drowsy and unable to swallow safely.  Family meeting planned for tomorrow for goals of care. Hospital Day: 3      Acute strokes L MCA territory  -Her last known well was the day before at 5 PM. She was found confused when woke up the next morning at 10 AM.  Initially with concern for right facial droop, symptoms fluctuating since arrival, has since developed more overt right sided weakness.  -CTA head showed extensive multifocal intracranial stenoses  -MRI 12/4 showed a few small acute infarcts in the left putamen and corona radiata, concern for markedly diminished flow in the proximal left MCA  - Telemetry  - Neuro checks q4h  -started on aspirin, Plavix, atorvastatin-not currently able to swallow these  -Permissive hypertension  -PT, OT, speech  -IVF for now while NPO  -Overall not doing well and is very somnolent, does wake up for brief periods but not enough to do any sort of rehab.  Could consider a temporary feeding tube while recovering from stroke but since she is not participating in rehab I do not see recovery happening.  Family meeting planned for tomorrow to discuss goals of care.    Acute metabolic encephalopathy  Severe hypoactive delirium  Underlying mild cognitive impairment  - likely developing some hospital delirum but may also be directly due to stroke  -Supportive care with melatonin at bedtime, seroquel prn     Mildly elevated troponin:  Troponin borderline elevated but uptrended 16-->20-->27  - Telemetry  - no further workup- goals of care  evolving    Hypoglycemia  -Due to dysphagia and decreased alertness  -D5 in IVF  -monitor     CLL: Mild lymphocytosis at baseline, stable from historic counts.     Essential hypertension: Hold home lisinopril for permissive hypertension.  Also unable to swallow     Hypothyroidism: TSH 0.97 and downtrending from 8 months ago. Consider slight dose reduction but would do as outpatient when well.     Hyperlipidemia: , started on atorvastatin as above.  Would recheck lipids in 1 to 3 months.          Diet: NPO for Medical/Clinical Reasons Except for: Meds    DVT Prophylaxis: Moderate risk.   Enoxaparin (Lovenox) SQ  Zaman Catheter: Not present  Lines: None     Cardiac Monitoring: ACTIVE order. Indication: Stroke, acute (48 hours)  Code Status: No CPR- Do NOT Intubate      Clinically Significant Risk Factors                   # Hypertension: Noted on problem list     # Dementia: noted on problem list                   Disposition Plan     Medically Ready for Discharge: Anticipated in 2-4 Days         Discharge barrier(s): worsening somnolence  Care discussed with: family      Ilene De Los Santos MD  Hospitalist Service  [facility]  Securely message with [facility] (more info)  Text page via [facility] Paging/Directory   ______________________________________________________________________      Physical Exam   Vital Signs: Temp: 98.7  F (37.1  C) Temp src: Axillary BP: 134/61 Pulse: 72   Resp: 20 SpO2: 97 % O2 Device: None (Room air)    Weight: 118 lbs 12.8 oz    General: elderly female sleeping with loud stertorous breathing  HEENT: Head normocephalic atraumatic, oral mucosa moist.  Skin: No rashes or lesions  Extremities: No peripheral edema  Psych: somnolent  Neuro: somnolent        Medical Decision Making               Data   Recent Results (from the past 16 hours)   Glucose by meter    Collection Time: 12/05/24 12:18 AM   Result Value Ref Range    GLUCOSE BY METER POCT 134 (H) 70 - 99 mg/dL   Glucose  by meter    Collection Time: 12/05/24  6:00 AM   Result Value Ref Range    GLUCOSE BY METER POCT 103 (H) 70 - 99 mg/dL   Basic metabolic panel    Collection Time: 12/05/24  6:33 AM   Result Value Ref Range    Sodium 141 135 - 145 mmol/L    Potassium 3.5 3.4 - 5.3 mmol/L    Chloride 107 98 - 107 mmol/L    Carbon Dioxide (CO2) 21 (L) 22 - 29 mmol/L    Anion Gap 13 7 - 15 mmol/L    Urea Nitrogen 7.3 (L) 8.0 - 23.0 mg/dL    Creatinine 0.66 0.51 - 0.95 mg/dL    GFR Estimate 82 >60 mL/min/1.73m2    Calcium 8.6 (L) 8.8 - 10.4 mg/dL    Glucose 90 70 - 99 mg/dL   Glucose by meter    Collection Time: 12/05/24 11:21 AM   Result Value Ref Range    GLUCOSE BY METER POCT 73 70 - 99 mg/dL       Interval History     Patient is not doing well today.  She is very sleepy and less alert.  Family do note she made eye contact and seemed aware of their presence but then she fell right back asleep.  I am not sure she is going to be able to participate in video swallow study planned later today.  Discussed with family, concerned she is not doing well, this may be hospital delirium but this coupled with stroke, not sure she is going to be able to rehab from this.  I do not think prognosis is very good.  We should consider nutrition such as tube feeding, but this comes with risks and should be discussed as a family.  Daughters will arrange for other family to come tomorrow for a family meeting sometime between 10 AM and 3 PM.  They can let the nurse know what time and I will be there.

## 2024-12-05 NOTE — PROGRESS NOTES
Dr. Aguayo's requesting Care Conference tomorrow between 10-3.  CM sent page to Neurologist requesting a time that would work for him.  Awaiting call back.  CM will arrange with any therapies that are able to be present tomorrow once I know what time the Neurologist is available.      Anel Arias RN

## 2024-12-05 NOTE — PLAN OF CARE
Goal Outcome Evaluation:                  Pt oriented to self only. Pt NIH score of 17. Significant R droop and drift. R sided neglect.   LS diminished. BS active. Incont of urine.   Family at bedside.

## 2024-12-05 NOTE — PROGRESS NOTES
12/05/24 0850   Appointment Info   Signing Clinician's Name / Credentials (PT) Shauna Peñaloza PT   Rehab Comments (PT) Patient in bed 3 daughters present in room.   Living Environment   People in Home child(cristino), adult  (daughter)   Current Living Arrangements house   Transportation Anticipated health plan transportation   Self-Care   Usual Activity Tolerance good   Current Activity Tolerance poor   Equipment Currently Used at Home cane, straight  (ocassionally used SEC inside first in am ,mostly used SEC and FWW outside.Has W/C at home)   Fall history within last six months no   Activity/Exercise/Self-Care Comment Info obtained from pt daughter   General Information   Onset of Illness/Injury or Date of Surgery 12/03/24   Referring Physician Ilene De Los Santos   Patient/Family Therapy Goals Statement (PT) patient unable to state   Pertinent History of Current Problem (include personal factors and/or comorbidities that impact the POC) Admitted with acute encephalopathy,found to have CVA with R sided weakness,droop and aphasic speach.Patient has ahx of R TKA.   Existing Precautions/Restrictions fall   Weight-Bearing Status - LLE weight-bearing as tolerated   Weight-Bearing Status - RLE weight-bearing as tolerated   Cognition   Affect/Mental Status (Cognition) low arousal/lethargic   Orientation Status (Cognition) unable/difficult to assess   Follows Commands (Cognition) unable/difficult to assess   Cognitive Status Comments aphasic speach ,R sided neglect   Range of Motion (ROM)   Range of Motion ROM is WFL   Strength (Manual Muscle Testing)   Strength (Manual Muscle Testing) Deficits observed during functional mobility   Bed Mobility   Rolling Left [place] (Bed Mobility) moderate assist (50% patient effort)   Rolling Right [place] (Bed Mobility) maximum assist (25% patient effort)   Scooting/Bridging [place] (Bed Mobility) dependent (less than 25% patient effort);2 person assist   Supine-Sit [place]  (Bed Mobility) moderate assist (50% patient effort)   Sit-Supine [place] (Bed Mobility) maximum assist (25% patient effort)   Impairments Contributing to Impaired Bed Mobility impaired balance;impaired postural control;decreased strength;other (see comments)  (cognition)   Assistive Device (Bed Mobility) bed rails   Transfers   Comment, (Transfers) N/A   Gait/Stairs (Locomotion)   Distance in Feet (Gait) N/A   Balance   Balance other (describe)   Sitting Balance: Static poor balance   Balance Comments Max assist while sitting unsuported -leans to R and back.Min assist /CGA while supported with giovanni U/E.   Clinical Impression   Criteria for Skilled Therapeutic Intervention Yes, treatment indicated   PT Diagnosis (PT) impaired functional mobility   Influenced by the following impairments medical,age,weakness   Functional limitations due to impairments strength,balance ,mobility   Clinical Presentation (PT Evaluation Complexity) evolving   Clinical Presentation Rationale patient presents as med diagnosed   Clinical Decision Making (Complexity) low complexity   Planned Therapy Interventions (PT) balance training;bed mobility training;neuromuscular re-education;strengthening;transfer training   Risk & Benefits of therapy have been explained evaluation/treatment results reviewed;current/potential barriers reviewed;daughter   PT Total Evaluation Time   PT Eval, Low Complexity Minutes (89772) 12   Physical Therapy Goals   PT Frequency 5x/week   PT Predicted Duration/Target Date for Goal Attainment 12/12/24   PT Goals Bed Mobility;Transfers   PT: Bed Mobility Minimal assist;Rolling;Supine to/from sit   PT: Transfers Moderate assist;Sit to/from stand;Bed to/from chair  (of 2)   Neuromuscular Re-education   Neuromuscular Re-Education Minutes (61285) 8   Symptoms Noted During/After Treatment fatigue   Treatment Detail/Skilled Intervention Patient sat on EOB x 5 min and worked on trunk stability and head control. with pt  supported and unsupported with giovanni U/E.   PT Discharge Planning   PT Plan strengthening,sitting balance   PT Discharge Recommendation (DC Rec) Transitional Care Facility   PT Rationale for DC Rec weak,needs assist with mobility ,poor balance.Needs more rehab to improve functional mobility .May need LTC pending progresss and POC per family   PT Brief overview of current status Mod/max assist of 1-2 bed mobility ,Max assist to sit on EOB   PT Equipment Needed at Discharge lift device;wheelchair   Physical Therapy Time and Intention   Timed Code Treatment Minutes 8   Total Session Time (sum of timed and untimed services) 20

## 2024-12-05 NOTE — PLAN OF CARE
Goal Outcome Evaluation:    Pt does not appear to be in any pain. Did become more restless into the shift. PRN PO Zyprexa was given with relief. No nausea noted. NPO. NSR with BBB on the monitor. NIH 24. Right side is completely flaccid. Neuro following. IVF. Blood sugar was 120. Pt is alert. Severe aphasia/garbled speech. Bedrest. Reposition every 2hrs. Elidia lift out of bed. Daughter at the bedside. Plan is for family to have a care conference tomorrow. Will continue to monitor.

## 2024-12-05 NOTE — PLAN OF CARE
Problem: Fall Injury Risk  Goal: Absence of Fall and Fall-Related Injury  Outcome: Progressing  Intervention: Identify and Manage Contributors  Recent Flowsheet Documentation  Taken 12/5/2024 0002 by Jef Nathan, RN  Medication Review/Management: medications reviewed  Taken 12/4/2024 2015 by Jef Nathan, RN  Medication Review/Management: medications reviewed  Intervention: Promote Injury-Free Environment  Recent Flowsheet Documentation  Taken 12/5/2024 0002 by Jef Nathan, RN  Safety Promotion/Fall Prevention:   activity supervised   assistive device/personal items within reach   bedside attendant   clutter free environment maintained  Taken 12/4/2024 2015 by Jef Nathan, RN  Safety Promotion/Fall Prevention:   activity supervised   assistive device/personal items within reach   bedside attendant   clutter free environment maintained     Problem: Stroke, Ischemic (Includes Transient Ischemic Attack)  Goal: Optimal Functional Ability  Outcome: Progressing   Goal Outcome Evaluation:       Pt lethargic easy to arouse to voice. On 1:1 sitter for safety. Scoring 20 on NIH see flowsheet for details. SR/ Sbrady with prolonged QT on tele. NPO maintained. BG monitored overnight with no insulin given. Incontinent care provided. No signs of pain noted this shift. VSS on room air.

## 2024-12-06 VITALS
BODY MASS INDEX: 21.73 KG/M2 | WEIGHT: 118.8 LBS | DIASTOLIC BLOOD PRESSURE: 63 MMHG | SYSTOLIC BLOOD PRESSURE: 137 MMHG | TEMPERATURE: 97.5 F | RESPIRATION RATE: 22 BRPM | HEART RATE: 65 BPM | OXYGEN SATURATION: 97 %

## 2024-12-06 PROBLEM — Z51.5 HOSPICE CARE: Status: ACTIVE | Noted: 2024-01-01

## 2024-12-06 LAB
GLUCOSE BLDC GLUCOMTR-MCNC: 132 MG/DL (ref 70–99)
PLATELET # BLD AUTO: 146 10E3/UL (ref 150–450)

## 2024-12-06 PROCEDURE — 99207 PR NO BILLABLE SERVICE THIS VISIT: CPT | Performed by: HOSPITALIST

## 2024-12-06 PROCEDURE — 92507 TX SP LANG VOICE COMM INDIV: CPT | Mod: GN

## 2024-12-06 PROCEDURE — 92526 ORAL FUNCTION THERAPY: CPT | Mod: GN

## 2024-12-06 PROCEDURE — 99231 SBSQ HOSP IP/OBS SF/LOW 25: CPT | Performed by: PSYCHIATRY & NEUROLOGY

## 2024-12-06 PROCEDURE — 250N000013 HC RX MED GY IP 250 OP 250 PS 637: Performed by: HOSPITALIST

## 2024-12-06 PROCEDURE — 85049 AUTOMATED PLATELET COUNT: CPT | Performed by: HOSPITALIST

## 2024-12-06 PROCEDURE — 99239 HOSP IP/OBS DSCHRG MGMT >30: CPT | Performed by: HOSPITALIST

## 2024-12-06 PROCEDURE — 36415 COLL VENOUS BLD VENIPUNCTURE: CPT | Performed by: HOSPITALIST

## 2024-12-06 PROCEDURE — 258N000003 HC RX IP 258 OP 636: Performed by: HOSPITALIST

## 2024-12-06 RX ORDER — CARBOXYMETHYLCELLULOSE SODIUM 5 MG/ML
1-2 SOLUTION/ DROPS OPHTHALMIC
Status: DISCONTINUED | OUTPATIENT
Start: 2024-12-06 | End: 2024-12-06 | Stop reason: HOSPADM

## 2024-12-06 RX ORDER — HYDROMORPHONE HYDROCHLORIDE 1 MG/ML
1 SOLUTION ORAL
Status: DISCONTINUED | OUTPATIENT
Start: 2024-12-06 | End: 2024-12-06 | Stop reason: HOSPADM

## 2024-12-06 RX ORDER — ATROPINE SULFATE 10 MG/ML
2 SOLUTION/ DROPS OPHTHALMIC EVERY 4 HOURS PRN
Status: DISCONTINUED | OUTPATIENT
Start: 2024-12-06 | End: 2024-12-06 | Stop reason: HOSPADM

## 2024-12-06 RX ORDER — ONDANSETRON 2 MG/ML
4 INJECTION INTRAMUSCULAR; INTRAVENOUS EVERY 6 HOURS PRN
Status: DISCONTINUED | OUTPATIENT
Start: 2024-12-06 | End: 2024-12-06 | Stop reason: HOSPADM

## 2024-12-06 RX ORDER — HYDROMORPHONE HYDROCHLORIDE 1 MG/ML
2 SOLUTION ORAL
Status: DISCONTINUED | OUTPATIENT
Start: 2024-12-06 | End: 2024-12-06 | Stop reason: HOSPADM

## 2024-12-06 RX ORDER — HYDROMORPHONE HYDROCHLORIDE 1 MG/ML
1 SOLUTION ORAL
Status: CANCELLED | OUTPATIENT
Start: 2024-12-06

## 2024-12-06 RX ORDER — ONDANSETRON 4 MG/1
4 TABLET, ORALLY DISINTEGRATING ORAL EVERY 6 HOURS PRN
Status: CANCELLED | OUTPATIENT
Start: 2024-12-06

## 2024-12-06 RX ORDER — BISACODYL 10 MG
10 SUPPOSITORY, RECTAL RECTAL
Status: CANCELLED | OUTPATIENT
Start: 2024-12-09

## 2024-12-06 RX ORDER — ONDANSETRON 2 MG/ML
4 INJECTION INTRAMUSCULAR; INTRAVENOUS EVERY 6 HOURS PRN
Status: CANCELLED | OUTPATIENT
Start: 2024-12-06

## 2024-12-06 RX ORDER — LORAZEPAM 2 MG/ML
0.5 INJECTION INTRAMUSCULAR EVERY 6 HOURS
Status: CANCELLED | OUTPATIENT
Start: 2024-12-06

## 2024-12-06 RX ORDER — MINERAL OIL/HYDROPHIL PETROLAT
OINTMENT (GRAM) TOPICAL
Status: DISCONTINUED | OUTPATIENT
Start: 2024-12-06 | End: 2024-12-06 | Stop reason: HOSPADM

## 2024-12-06 RX ORDER — OLANZAPINE 5 MG/1
5 TABLET, ORALLY DISINTEGRATING ORAL DAILY PRN
Status: CANCELLED | OUTPATIENT
Start: 2024-12-06

## 2024-12-06 RX ORDER — ACETAMINOPHEN 650 MG/1
650 SUPPOSITORY RECTAL EVERY 4 HOURS PRN
Status: CANCELLED | OUTPATIENT
Start: 2024-12-06

## 2024-12-06 RX ORDER — GLYCOPYRROLATE 0.2 MG/ML
0.2 INJECTION, SOLUTION INTRAMUSCULAR; INTRAVENOUS EVERY 4 HOURS PRN
Status: CANCELLED | OUTPATIENT
Start: 2024-12-06

## 2024-12-06 RX ORDER — LORAZEPAM 2 MG/ML
0.5 INJECTION INTRAMUSCULAR EVERY 6 HOURS PRN
Status: CANCELLED | OUTPATIENT
Start: 2024-12-06

## 2024-12-06 RX ORDER — MINERAL OIL/HYDROPHIL PETROLAT
OINTMENT (GRAM) TOPICAL
Status: CANCELLED | OUTPATIENT
Start: 2024-12-06

## 2024-12-06 RX ORDER — BISACODYL 10 MG
10 SUPPOSITORY, RECTAL RECTAL
Status: DISCONTINUED | OUTPATIENT
Start: 2024-12-09 | End: 2024-12-06 | Stop reason: HOSPADM

## 2024-12-06 RX ORDER — HYDROMORPHONE HYDROCHLORIDE 1 MG/ML
0.5 INJECTION, SOLUTION INTRAMUSCULAR; INTRAVENOUS; SUBCUTANEOUS
Status: CANCELLED | OUTPATIENT
Start: 2024-12-06

## 2024-12-06 RX ORDER — ONDANSETRON 4 MG/1
4 TABLET, ORALLY DISINTEGRATING ORAL EVERY 6 HOURS PRN
Status: DISCONTINUED | OUTPATIENT
Start: 2024-12-06 | End: 2024-12-06 | Stop reason: HOSPADM

## 2024-12-06 RX ORDER — CARBOXYMETHYLCELLULOSE SODIUM 5 MG/ML
1-2 SOLUTION/ DROPS OPHTHALMIC
Status: CANCELLED | OUTPATIENT
Start: 2024-12-06

## 2024-12-06 RX ORDER — HYDROMORPHONE HYDROCHLORIDE 2 MG/1
2 TABLET ORAL
Status: DISCONTINUED | OUTPATIENT
Start: 2024-12-06 | End: 2024-12-06 | Stop reason: HOSPADM

## 2024-12-06 RX ORDER — HYDROMORPHONE HYDROCHLORIDE 1 MG/ML
0.5 INJECTION, SOLUTION INTRAMUSCULAR; INTRAVENOUS; SUBCUTANEOUS EVERY 4 HOURS
Status: CANCELLED | OUTPATIENT
Start: 2024-12-06

## 2024-12-06 RX ADMIN — DEXTROSE AND SODIUM CHLORIDE: 5; 900 INJECTION, SOLUTION INTRAVENOUS at 00:53

## 2024-12-06 RX ADMIN — HYDROMORPHONE HYDROCHLORIDE 1 MG: 1 SOLUTION ORAL at 11:49

## 2024-12-06 RX ADMIN — HYDROMORPHONE HYDROCHLORIDE 2 MG: 1 SOLUTION ORAL at 14:12

## 2024-12-06 RX ADMIN — Medication 1 DROP: at 11:53

## 2024-12-06 ASSESSMENT — ACTIVITIES OF DAILY LIVING (ADL)
ADLS_ACUITY_SCORE: 85
ADLS_ACUITY_SCORE: 85
ADLS_ACUITY_SCORE: 81
ADLS_ACUITY_SCORE: 85
ADLS_ACUITY_SCORE: 81
ADLS_ACUITY_SCORE: 85
ADLS_ACUITY_SCORE: 81
ADLS_ACUITY_SCORE: 85

## 2024-12-06 NOTE — PROGRESS NOTES
Care Management Follow Up    Length of Stay (days): 3    Expected Discharge Date: 12/08/2024     Concerns to be Addressed: discharge planning     Patient plan of care discussed at interdisciplinary rounds: Yes    Anticipated Discharge Disposition:  [facility]        Anticipated Discharge Services:  Hospital Hospice  Anticipated Discharge DME:  per hospice     Patient/family educated on Medicare website which has current facility and service quality ratings:  yes  Education Provided on the Discharge Plan:  yes  Patient/Family in Agreement with the Plan:  yes    Referrals Placed by CM/SW:  NA  Private pay costs discussed: Not applicable    Discussed  Partnership in Safe Discharge Planning  document with patient/family: No     Handoff Completed: No, handoff not indicated or clinically appropriate    Additional Information:  Family transitioned patient to comfort cares.  Family signed patient on to [facility].     Next Steps: follow progression of care and support family as needed.    Anel Arias RN

## 2024-12-06 NOTE — PLAN OF CARE
Physical Therapy Discharge Summary    Reason for therapy discharge:    Change in medical status.  Pt on comfort cares.       Shabana Lerner, PT, DPT  12/6/2024

## 2024-12-06 NOTE — PLAN OF CARE
Problem: Adult Inpatient Plan of Care  Goal: Optimal Comfort and Wellbeing  Outcome: Progressing     Problem: Fall Injury Risk  Goal: Absence of Fall and Fall-Related Injury  Outcome: Progressing  Intervention: Identify and Manage Contributors  Recent Flowsheet Documentation  Taken 12/6/2024 0045 by Miles Wylie RN  Medication Review/Management: medications reviewed  Taken 12/5/2024 2043 by Miles Wylie RN  Medication Review/Management: medications reviewed  Intervention: Promote Injury-Free Environment  Recent Flowsheet Documentation  Taken 12/6/2024 0045 by Miles Wylie RN  Safety Promotion/Fall Prevention:   activity supervised   assistive device/personal items within reach   clutter free environment maintained   bedside attendant  Taken 12/5/2024 2043 by Miles Wylie RN  Safety Promotion/Fall Prevention   activity supervised   assistive device/personal items within reach   clutter free environment maintained   bedside attendant   Goal Outcome Evaluation:  Alert and unable to assess orientation. Garbled speaking and speaking minimally. NIHSS scores (26-26-26). PAINAD (0). Incontinent of B&B. Pt had three large brown watery/mucous looking stools this shift. Q2h repositioning completed. Lung sounds coarse. VSS on room air. Pt refused to wear telemetry, cross cover was notified. D5+NS@75mL/hr.

## 2024-12-06 NOTE — PLAN OF CARE
Occupational Therapy Discharge Summary    Reason for therapy discharge:    Change in medical status, moving to comfort focused care.

## 2024-12-06 NOTE — CONSULTS
SPIRITUAL HEALTH SERVICES Progress Note  [facility], [place], P1    Saw pt Mariela Ortiz per MD.    Patient/Family Understanding of Illness and Goals of Care - yes    Distress and Loss - Mariela is dying. Two dtrs are at bedside. The mood was one of gratitude. Mariela is alert, listening and participating in visit as able.    Strengths, Coping, and Resources - Jolly and family are front and center in meaning and support.    Meaning, Beliefs, and Spirituality - Mariela is a lifelong [employer]. She is gifted in singing which Lutherans do a lot of. The dtrs, myself and Mariela sang a few hymns bringing smiles to everyone. She does not appear afraid of death. I acknowledged that letting go is hard, both for her and for those who love her. Her face communicated agreement. My prayer included blessings of peace, anticipation of eternity with God, and encouragement to keep in touch.    Plan of Care - Spiritual Care is available as requested    Domonique Marte MDiv  , Spiritual Health Services      Spiritual Health Services is available 24/7 for emergent requests and consults, either by paging the on-call  or by entering an ASAP/STAT consult in [employer], which will also page the on-call .

## 2024-12-06 NOTE — DISCHARGE SUMMARY
[facility] MEDICINE  DISCHARGE SUMMARY     Primary Care Physician: [facility]  Admission Date: 12/3/2024   Discharge Provider: Ilene De Los Santos MD Discharge Date: 12/6/2024   Diet:   Active Diet and Nourishment Order   Procedures    NPO for Medical/Clinical Reasons Except for: Meds       Code Status: No CPR- Do NOT Intubate   Activity: DCACTIVITY: Activity as tolerated        Condition at Discharge: Terminal     REASON FOR PRESENTATION(See Admission Note for Details)   AMS, slurred speech    PRINCIPAL & ACTIVE DISCHARGE DIAGNOSES     Principal Problem:    Acute embolic strokes involving left middle cerebral artery (H)  Active Problems:    Hypothyroidism    Chronic lymphocytic leukemia (H)    Benign essential hypertension    Acute encephalopathy    Altered mental status, unspecified altered mental status type      PENDING LABS     Unresulted Labs Ordered in the Past 30 Days of this Admission       No orders found from 11/3/2024 to 12/4/2024.            PROCEDURES ( this hospitalization only)      none    RECOMMENDATIONS TO OUTPATIENT PROVIDER FOR F/U VISIT     Inpatient Hospice    DISPOSITION     Inpatient Hospice    SUMMARY OF HOSPITAL COURSE:      Mariela Ortiz is a 89 year old female with hypertension, hypothyroidism, low-grade CLL, baseline mild cognitive impairment with sundowning here for evaluation of acute onset altered mental status and right facial droop found to have acute strokes in L MCA territory associated with a significant flow defect within the MCA. She is not doing well and has been progressively more somnolent, unable to take p.o. meds safely and not able to participate in rehabilitation efforts.  She is now transitioning to Inpatient Hospice. Hospital Day: 4        Acute strokes L MCA territory  Severe flow defect within the proximal L MCA  -Her last known well was the day before at 5 PM. She was found confused when woke up the next  morning at 10 AM.  Initially with concern for right facial droop, symptoms fluctuating since arrival, has since developed more overt right sided weakness.  Becoming more somnolent.  -CTA head showed extensive multifocal intracranial stenoses  -MRI 12/4 showed a few small acute infarcts in the left putamen and corona radiata, and concern for markedly diminished flow in the proximal left MCA. Strokes attributed to this intracranial stenosis  -Patient has not been able to receive oral nutrition or meds for DAPT due to decreased alertness and dysphagia. She is overall not doing well and not alert enough to participate in rehab.  Family meeting was held today, decision for comfort focused care.  Patient transitioning to Inpatient Hospice     Acute metabolic encephalopathy  Severe hypoactive delirium  Underlying mild cognitive impairment  - likely has some hospital delirum but may also be directly due to stroke  -Supportive care   -melatonin at bedtime if alert enough to take, had benefit from Zyprexa ODT prn as well     Hypoglycemia  -Due to dysphagia and decreased alertness  -after discussion with family, will discontinue IVF now.  -discontinue BG checks     CLL: Mild lymphocytosis at baseline, stable from historic counts.     Essential hypertension: discontinued home lisinopril as unable to swallow     Hypothyroidism: discontinued Synthroid due to comfort cares     Hyperlipidemia:  contributing to stroke above. No treatment due to comfort cares      Discharge Medications with Med changes:     Current Discharge Medication List        CONTINUE these medications which have NOT CHANGED    Details   amoxicillin (AMOXIL) 500 MG tablet Take 4 tablets by mouth once. 1 hour prior to dental appointment      levothyroxine (SYNTHROID/LEVOTHROID) 100 MCG tablet Take 100 mcg by mouth every morning.      lisinopril (ZESTRIL) 5 MG tablet Take 5 mg by mouth every morning.      loperamide (IMODIUM) 2 MG capsule Take 2 mg by mouth  "daily as needed for diarrhea      vitamin B-12 (CYANOCOBALAMIN) 1000 MCG tablet Take 1 tablet by mouth every morning.               Consults     NEUROLOGY IP CONSULT  SPEECH LANGUAGE PATH ADULT IP CONSULT  PHYSICAL THERAPY ADULT IP CONSULT  OCCUPATIONAL THERAPY ADULT IP CONSULT  PHARMACY IP CONSULT  PHARMACY IP CONSULT  PHARMACY IP CONSULT  REHAB ADMISSIONS LIAISON IP CONSULT  CARE MANAGEMENT / SOCIAL WORK IP CONSULT  SPIRITUAL HEALTH SERVICES IP CONSULT  [facility] INPATIENT HOSPICE ADULT CONSULT  SMOKING CESSATION PROGRAM IP CONSULT  PHARMACY IP CONSULT      SIGNIFICANT IMAGING FINDINGS     Results for orders placed or performed during the hospital encounter of 12/03/24   CTA Head Neck with Contrast    Impression    IMPRESSION:   HEAD CT:  1.  No evidence for acute infarct, mass or hemorrhage. Atrophy and chronic vascular changes. No \"dense vessel\" sign to strongly suggest acute intraluminal thrombus    HEAD CTA:   1.  Extensive, multifocal intracranial stenoses which are likely atherosclerotic.    2.  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 Correlate with any signs or symptoms of posterior fossa ischemia and consider further evaluation with MRI with diffusion. No definite acute intraluminal thrombus/meniscus sign or dense   vessel on the conventional CT.    3.  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 Correlate clinically.    4.  Findings discussed with Dr. Carter in the emergency department on 12/3/2024 1243 PM CST    NECK CTA:  1.  High-grade stenosis at right ICA origin    2.  Mild stenosis at left ICA origin.    3.   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   MR Brain w/o Contrast    Impression    IMPRESSION:  1.  No acute intracranial process.  2.  Generalized brain atrophy and presumed microvascular ischemic changes as detailed above.   XR Chest Port 1 View    Impression    IMPRESSION: Negative chest.   MR Brain w/o Contrast    " Impression    CONCLUSION:   HEAD MRI:  1.  There are a few small acute infarcts within the upper left putamen and overlying corona radiata.   2.  No hemorrhage.    HEAD MRA:  1.  Markedly diminished flow related enhancement within the proximal left middle cerebral artery at least in part due to severe proximal stenosis and motion artifact. Superimposed vessel thrombus or occlusion cannot be excluded on this exam. Follow-up   CTA may be useful given the infarcts in this territory on diffusion imaging.  2.  Intracranial atherosclerosis elsewhere including severe multifocal basilar artery stenosis was better demonstrated on the CTA.   MRA Brain ([place] [facility] [place]) w/o Contrast    Impression    CONCLUSION:   HEAD MRI:  1.  There are a few small acute infarcts within the upper left putamen and overlying corona radiata.   2.  No hemorrhage.    HEAD MRA:  1.  Markedly diminished flow related enhancement within the proximal left middle cerebral artery at least in part due to severe proximal stenosis and motion artifact. Superimposed vessel thrombus or occlusion cannot be excluded on this exam. Follow-up   CTA may be useful given the infarcts in this territory on diffusion imaging.  2.  Intracranial atherosclerosis elsewhere including severe multifocal basilar artery stenosis was better demonstrated on the CTA.       SIGNIFICANT LABORATORY FINDINGS     See emr    Discharge Orders     No discharge procedures on file.    Examination   Physical Exam   Temp:  [97.5  F (36.4  C)-98.6  F (37  C)] 97.5  F (36.4  C)  Pulse:  [65-95] 65  Resp:  [22-24] 22  BP: (137-177)/(63-79) 137/63  SpO2:  [93 %-97 %] 97 %  Wt Readings from Last 1 Encounters:   12/03/24 53.9 kg (118 lb 12.8 oz)       Please see EMR for more detailed significant labs, imaging, consultant notes etc.    IIlene MD, personally saw the patient today and spent greater than 30 minutes discharging this patient.    Ilene De Los Santos MD  [facility]  Hospital    CC:Clinic - [facility]

## 2024-12-06 NOTE — CONSULTS
Care Management Initial Consult    General Information  Assessment completed with: Children, Lyla and Ginger  Type of CM/SW Visit: Initial Assessment    Primary Care Provider verified and updated as needed: Yes   Readmission within the last 30 days: no previous admission in last 30 days      Reason for Consult: discharge planning  Advance Care Planning: Advance Care Planning Reviewed: present on chart          Communication Assessment  Patient's communication style: spoken language (English or Bilingual)    Hearing Difficulty or Deaf: yes   Wear Glasses or Blind: no    Cognitive  Cognitive/Neuro/Behavioral: .WDL except, level of consciousness  Level of Consciousness: alert  Arousal Level: arouses to voice  Orientation: other (see comments) (unable to speak)  Mood/Behavior: calm, cooperative  Best Language: 2 - Severe aphasia  Speech: garbled    Living Environment:   People in home: child(cristino), adult  Judith  Current living Arrangements: house      Able to return to prior arrangements: no (not in currernt state)       Family/Social Support:  Care provided by: child(cristino), self  Provides care for: no one  Marital Status:   Support system: Children          Description of Support System: Supportive, Involved    Support Assessment: Adequate family and caregiver support, Adequate social supports    Current Resources:   Patient receiving home care services: No        Community Resources: None  Equipment currently used at home: cane, straight (ocassionally used cane inside first in am, mostly used cane and FWW outside. Has transport W/C at home)  Supplies currently used at home: Incontinence Supplies, Other    Employment/Financial:  Employment Status: retired     Employment/ Comments: no  benefits  Financial Concerns: none           Does the patient's insurance plan have a 3 day qualifying hospital stay waiver?  No    Lifestyle & Psychosocial Needs:  Social Drivers of Health     Food Insecurity: Low  Risk  (12/5/2024)    Food Insecurity     Within the past 12 months, did you worry that your food would run out before you got money to buy more?: No     Within the past 12 months, did the food you bought just not last and you didn t have money to get more?: No   Depression: Not at risk (5/16/2023)    [facility], [facility]    PHQ-2     PHQ-2 TOTAL SCORE: 0   Housing Stability: Low Risk  (12/5/2024)    Housing Stability     Do you have housing? : Yes     Are you worried about losing your housing?: No   Tobacco Use: Medium Risk (5/13/2024)    Received from [facility]    Patient History     Smoking Tobacco Use: Former     Smokeless Tobacco Use: Never     Passive Exposure: Not on file   Financial Resource Strain: Low Risk  (12/5/2024)    Financial Resource Strain     Within the past 12 months, have you or your family members you live with been unable to get utilities (heat, electricity) when it was really needed?: No   Alcohol Use: Not on file   Transportation Needs: Low Risk  (12/5/2024)    Transportation Needs     Within the past 12 months, has lack of transportation kept you from medical appointments, getting your medicines, non-medical meetings or appointments, work, or from getting things that you need?: No   Physical Activity: Not on file   Interpersonal Safety: Unknown (12/5/2024)    Interpersonal Safety     Do you feel physically and emotionally safe where you currently live?: Patient unable to answer     Within the past 12 months, have you been hit, slapped, kicked or otherwise physically hurt by someone?: Patient unable to answer     Within the past 12 months, have you been humiliated or emotionally abused in other ways by your partner or ex-partner?: Patient unable to answer   Stress: Not on file   Social Connections: Socially Integrated (10/25/2023)    [facility]    Social Connections     Frequency of Communication with Friends and Family: 0   Health Literacy: Not on file       Functional Status:  Prior to admission patient needed assistance:   Dependent ADLs:: Ambulation-cane, Bathing (bathing is set up and has assist in and out of the tub.)  Dependent IADLs:: Cleaning, Cooking, Laundry, Shopping, Meal Preparation, Medication Management, Transportation       Mental Health Status:  Mental Health Status: No Current Concerns       Chemical Dependency Status:  Chemical Dependency Status: No Current Concerns             Discussed  Partnership in Safe Discharge Planning  document with patient/family: No    Additional Information:  CM met with patient's daughter's (Stephanie and Yary) in patient's room. Patient is not able to engage in conversation.    Patient lives in a house with her youngest daughter, Beth.  There are 4-5 steps to enter and patient has been able to manage these.  At baseline, patient doesn't normally use assistive devices in the home, will occasionally use a cane.  And when outside, she will typically use a cane or walker.  Patient also has a transport wheelchair for longer distances. Daughter beth would set up and help patient into and out of the tub and patient would bathe herself.  Patient's daughter assisted with all IADLs.   Patient has depends.  No additional supplies or equipment.  No home care or community services.      CM explained therapies recommendation for TCU.  Daughters are considering hospice.  CM provided list of hospice facilities and agencies.     Next Steps: Follow up tomorrow after family conference.    Anel Arias RN

## 2024-12-06 NOTE — PROGRESS NOTES
Alert most of the shift. Labored breathing and restless. Family in the room all day. Incontinent of urine and BM. Turned and repositioned ever 2 hours. Restless and tries to get up. Dangled her feet. Oral dilaudid given x 2. Pt rests in between meds. Pt is on one to one supervision for safety.     Cierra Ngo RN

## 2024-12-06 NOTE — H&P
[facility]    History and Physical - Hospitalist Service       Date of Admission:  12/6/2024    Assessment & Plan   Mariela Ortiz is a 89 year old female who is being transitioned to inpatient hospice on 12/6/2024. Please see the discharge summary from the same date for more details of her hospital course.    Patient's breathing has been labored, we will schedule some IV Dilaudid.  Patient has been restless, not redirectable, schedule some IV Ativan.       Diet: NPO for Medical/Clinical Reasons Except for: Meds    Zaman Catheter: Not present  Lines: None     Code Status: No CPR- Do NOT Intubate    Expected Discharge: working on discharge with hospice    Ilene De Los Santos MD  Hospitalist Service  [facility]  Securely message with [employer] (more info)  Text page via [employer] Paging/Directory     ______________________________________________________________________    Chief Complaint   Transitioning to inpatient hospice    History of Present Illness   Mariela Ortiz is a 89 year old female who is being transitioned to inpatient hospice.  Please see the discharge summary from the same date for more details; a brief summary of her current symptoms is included here.      Physical Exam   Vital Signs:                    Weight: 0 lbs 0 oz    Physical exam deferred given current goals of care    Medical Decision Making

## 2024-12-06 NOTE — CARE CONFERENCE
[facility] Care Conference    Attendance: Filiberto BEST, daughters Stephanie, Judith and Catalina.    Discussed the natural history of stroke and the possible outcomes of a stroke.    Discussed patient's imaging findings including acute strokes and a concern for a significant near-blockage of one of the main arteries of the brain (left MCA).    Discussed how we usually treat strokes including medical therapies and rehabilitation, neither of which patient has been able to participate in.  She seems to be worsening daily and today she is less alert and not handling her secretions well and seems uncomfortable.    Patient might have a significant component of delirium. Discussed that her right sided weakness might also be contributing to delirium since she probably does not understand the cause of this.  She is also right-handed unfortunately.   We briefly discussed that delirium is treated by trying to normalize the environment including removing tubes and lines that are attached to the patient and getting them out of the hospital etc.  Discussed that is difficult to do for the patient at this time.     Discussed that patient is not currently receiving any nutrition, this makes it harder for her muscles to rehab after the stroke.  There is no way to give her nutrition orally that is safe right now, she is high risk for aspiration with all textures.  Discussed options for artificial nutrition including TPN, NG tube, percutaneous gastrostomy tube and explained why I do not feel any of these are good options for patient at this time.  None of these would address the underlying stroke symptoms and her difficulty rehabbing.  I feel artificial nutrition would only prolong the dying process.  It may also introduce additional sources of discomfort and delirium for the patient. Family in agreement about no artificial nutrition.  Family noted that patient had recently spelled out in her advanced direction that she would not want any heroic  "measures.  She had also requested a \"Spiritual healer of the [employer],\" daughters admitted they were unsure what she meant by this but they felt a  visit may be appreciated. Consult will be placed.    My recommendation is that we proceed with comfort focused care.  Discussed that we could continue the IV fluid until family members who wish to have visited with the patient and then I would recommend that we stop it.  She had hypoglycemia previously, I do not feel she would survive very long off of the IV drip.  Reasonable options would be to go to long-term care with hospice or transition to [facility] hospice at family preference.    Family appreciated the information and are going to confer further and let us know their decision.  Family gave me permission to proceed with comfort focused orders including atropine drops.    Ilene De Los Santos MD  [facility]  "

## 2024-12-06 NOTE — PHARMACY-ADMISSION MEDICATION HISTORY
Admission medication history completed at [facility]. Please see Medication Scribe Admission Medication History note from 12/3/2024.

## 2024-12-06 NOTE — PROGRESS NOTES
NEUROLOGY INPATIENT PROGRESS NOTE       [facility] NEUROLOGY [place]  [address] [place] [address]  Tel: [phone]  Fax: [phone]  [email or web address]     ASSESSMENT & PLAN   Date: 12/05/2024  Hospital Day#: 2  Visit diagnosis: Subcortical infarction    Left putamen & corona radiata infarction  89-year-old female with HTN, CLL, hypothyroidism admitted with right-sided weakness, confusion.  Initial MRI scan was negative but subsequent MRI confirmed subcortical infarct  CT, CTA head and neck showed generalized atrophy with extensive multifocal intracranial atherosclerotic disease with possible mid to distal basilar artery occlusion.  Additionally high-grade stenosis of the right ICA  Initial MRI brain showed generalized atrophy but subsequent MRI scan showed infarct in the left putamen and corona radiata.  Left MCA stenosis and multifocal basilar artery stenosis noted on MRA.  Diffuse intracranial atherosclerosis he requires maximal medical treatment, no acute intervention indicated  Dual antiplatelet therapy with baby aspirin and Plavix for 90 days afterwards switch to enteric-coated aspirin 325 mg daily   patient started on Lipitor 40 mg daily with goal of LDL less than 70.  Echocardiogram shows lower limits of ejection fraction at 50% with abnormal septal motion likely related to bundle branch block.  No significant valvular heart abnormality with mild left atrial enlargement  Continue telemetry monitoring.  If no cardiac arrhythmias noted scheduled for outpatient Zio patch  ***    Neurology Discharge Planning :   JUANA Coleman MD  [facility] NEUROLOGY, [place]     PROBLEM LIST      Patient Active Problem List   Diagnosis    Hypothyroidism    GERD (gastroesophageal reflux disease)    Chronic lymphocytic leukemia (H)    Spinal stenosis    Type 2 diabetes mellitus without complication (H)    Hyperlipidemia LDL goal <100    Right knee pain    Low back pain     AC (acromioclavicular) arthritis    Arthritis of shoulder    Hypothyroidism due to acquired atrophy of thyroid    Alcohol use    Closed fracture of part of neck of femur (H)    Dementia (H)    Disequilibrium    Dyspepsia    Fecal urgency    Melanoma of skin (H)    Osteoarthritis of knees, bilateral    Cystocele    Sensorineural hearing loss    Vaginal vault prolapse after hysterectomy    Benign paroxysmal positional vertigo due to bilateral vestibular disorder    Benign essential hypertension    Anorexia    Confusion    Chronic diarrhea    General weakness    Confusion associated with infection    Acute cystitis without hematuria    COVID-19    Acute encephalopathy    Altered mental status, unspecified altered mental status type    Acute embolic strokes involving left middle cerebral artery (H)    TMJ syndrome       Past Medical History:   Patient  has a past medical history of Melanoma (H).     SUBJECTIVE     Patient somnolent but wakes up for a brief period of time.  Speech continues to be garbled     OBJECTIVE     Vital signs in last 24 hours  Temp:  [97.3  F (36.3  C)-99.2  F (37.3  C)] 97.8  F (36.6  C)  Pulse:  [58-76] 65  Resp:  [17-22] 22  BP: (107-140)/(49-67) 107/52  SpO2:  [95 %-98 %] 96 %    Review of Systems   Pertinent items are noted in HPI.    General Physical Exam: Patient is alert and oriented x 1. Vital signs were reviewed and are documented in EMR. Neck was supple, no carotid bruit, thyromegaly, JVD or lymphadenopathy noted.  Neurological Exam:  Patient is alert and oriented x 1 speech is slurred.  She has left gaze preference.  Pupil 2 mm minimally reactive she has decreased right nasolabial fold and intermittently responds to question.  She moves left side more briskly compared to right grimaces to painful stimuli in all 4 extremities.  Reflexes absent toes equivocal.  Did not cooperate for cerebellar testing     DIAGNOSTIC STUDIES     Pertinent Radiology   Following imaging studies were  "reviewed:    CT  HEAD CT:  1.  No evidence for acute infarct, mass or hemorrhage. Atrophy and chronic vascular changes. No \"dense vessel\" sign to strongly suggest acute intraluminal thrombus     HEAD CTA:   1.  Extensive, multifocal intracranial stenoses which are likely atherosclerotic.     2.  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 Correlate with any signs or symptoms of posterior fossa ischemia and consider further evaluation with MRI with diffusion. No definite acute intraluminal thrombus/meniscus sign or dense   vessel on the conventional CT.     3.  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 Correlate clinically.     NECK CTA:  1.  High-grade stenosis at right ICA origin     2.  Mild stenosis at left ICA origin.     3.   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     MRI  12/4/2024  HEAD MRI:  1.  There are a few small acute infarcts within the upper left putamen and overlying corona radiata.   2.  No hemorrhage.     HEAD MRA:  1.  Markedly diminished flow related enhancement within the proximal left middle cerebral artery at least in part due to severe proximal stenosis and motion artifact. Superimposed vessel thrombus or occlusion cannot be excluded on this exam. Follow-up   CTA may be useful given the infarcts in this territory on diffusion imaging.  2.  Intracranial atherosclerosis elsewhere including severe multifocal basilar artery stenosis was better demonstrated on the CTA.    12/3/2024  1.  No acute intracranial process.  2.  Generalized brain atrophy and presumed microvascular ischemic changes as detailed above.    EEG  This is an abnormal EEG due to diffusely slow background in theta and delta range that suggests a nonspecific generalized cerebral dysfunction. Such slowing can be seen in metabolic or toxic abnormalities, clinical correlation is recommended. No sharp discharges or spikes were recorded during this recording " to suggest a seizure disorder.      Echocardiogram  1. The left ventricle is normal in size. Left ventricular systolic performance  is at the lower limits of normal. The ejection fraction is estimated to be  50%.  2. There is abnormal septal motion likely related to altered electrical  activation due to bundle branch block.  3. No significant valvular heart disease is identified on this study.  4. Normal right ventricular size and systolic performance.  5. There is mild left atrial enlargement.    Pertinent Labs   Lab Results: Personally Reviewed  Recent Results (from the past 24 hours)   Glucose by meter    Collection Time: 12/04/24  8:59 PM   Result Value Ref Range    GLUCOSE BY METER POCT 103 (H) 70 - 99 mg/dL   Glucose by meter    Collection Time: 12/05/24 12:18 AM   Result Value Ref Range    GLUCOSE BY METER POCT 134 (H) 70 - 99 mg/dL   Glucose by meter    Collection Time: 12/05/24  6:00 AM   Result Value Ref Range    GLUCOSE BY METER POCT 103 (H) 70 - 99 mg/dL   Basic metabolic panel    Collection Time: 12/05/24  6:33 AM   Result Value Ref Range    Sodium 141 135 - 145 mmol/L    Potassium 3.5 3.4 - 5.3 mmol/L    Chloride 107 98 - 107 mmol/L    Carbon Dioxide (CO2) 21 (L) 22 - 29 mmol/L    Anion Gap 13 7 - 15 mmol/L    Urea Nitrogen 7.3 (L) 8.0 - 23.0 mg/dL    Creatinine 0.66 0.51 - 0.95 mg/dL    GFR Estimate 82 >60 mL/min/1.73m2    Calcium 8.6 (L) 8.8 - 10.4 mg/dL    Glucose 90 70 - 99 mg/dL   Glucose by meter    Collection Time: 12/05/24 11:21 AM   Result Value Ref Range    GLUCOSE BY METER POCT 73 70 - 99 mg/dL         HOSPITAL MEDICATIONS     Current Facility-Administered Medications   Medication Dose Route Frequency Provider Last Rate Last Admin    aspirin (ASA) chewable tablet 81 mg  81 mg Oral Daily Alexis Coleman MD   81 mg at 12/04/24 1521    atorvastatin (LIPITOR) tablet 40 mg  40 mg Oral QPM Alexis Coleman MD        clopidogrel (PLAVIX) tablet 75 mg  75 mg Oral Daily Alexis Coleman MD   75 mg at  12/04/24 1521    enoxaparin ANTICOAGULANT (LOVENOX) injection 40 mg  40 mg Subcutaneous Q24H Jenelle Ho MD   40 mg at 12/04/24 2049    [Held by provider] levothyroxine (SYNTHROID/LEVOTHROID) tablet 100 mcg  100 mcg Oral QAM AC Jenelle Ho MD        [Held by provider] lisinopril (ZESTRIL) tablet 5 mg  5 mg Oral Jenelle Gilliland MD        melatonin tablet 3 mg  3 mg Oral Daily with supper Ilene De Los Santos MD   3 mg at 12/04/24 1725    sodium chloride (PF) 0.9% PF flush 3 mL  3 mL Intracatheter Q8H Jenelle Ho MD   3 mL at 12/05/24 0330        Total time spent for face to face visit, reviewing labs/imaging studies, counseling and coordination of care was: 45 Minutes More than 50% of this time was spent on counseling and coordination of care.      This note was dictated using voice recognition software.  Any grammatical or context distortions are unintentional and inherent to the software.

## 2024-12-06 NOTE — PROGRESS NOTES
Speech Language Therapy Discharge Summary    Reason for therapy discharge:    No further expectations of functional progress.  Change in medical status.    Progress towards therapy goal(s). See goals on Care Plan in [facility] electronic health record for goal details.  MD reports transition to comfort care.    Therapy recommendation(s):    No further therapy is recommended.

## 2024-12-06 NOTE — PROGRESS NOTES
[facility]    Medicine Progress Note - Hospitalist Service    Date of Admission:  12/3/2024    Assessment & Plan                Mariela Ortiz is a 89 year old female with hypertension, hypothyroidism, low-grade CLL, baseline mild cognitive impairment with sundowning here for evaluation of acute onset altered mental status and right facial droop found to have acute strokes in L MCA territory associated with a significant flow defect within the MCA. She is not doing well and has been progressively more somnolent, unable to take p.o. meds safely and not participating in rehabilitation efforts.  She is now transitioning to comfort cares. Hospital Day: 4      Acute strokes L MCA territory  -Her last known well was the day before at 5 PM. She was found confused when woke up the next morning at 10 AM.  Initially with concern for right facial droop, symptoms fluctuating since arrival, has since developed more overt right sided weakness.  Becoming more somnolent.  -CTA head showed extensive multifocal intracranial stenoses  -MRI 12/4 showed a few small acute infarcts in the left putamen and corona radiata, concern for markedly diminished flow in the proximal left MCA  -Patient has not been  receiving any oral nutrition or meds for DAPT due to decreased alertness and dysphagia. She is overall not doing well and not alert enough to participate in rehab.  Family meeting was held today, decision for comfort focused care.  GIP consulted    Acute metabolic encephalopathy  Severe hypoactive delirium  Underlying mild cognitive impairment  - likely has some hospital delirum but may also be directly due to stroke  -Supportive care   -melatonin at bedtime if alert enough to take, had benefit from Zyprexa ODT prn as well     Hypoglycemia  -Due to dysphagia and decreased alertness  -after discussion with family, will discontinue IVF now.  -discontinue BG checks     CLL: Mild lymphocytosis at baseline, stable  from historic counts.     Essential hypertension: discontinue home lisinopril as unable to swallow     Hypothyroidism: discontinue Synthroid due to comfort cares    Hyperlipidemia:  contributing to stroke above. No treatment due to comfort cares          Diet: NPO for Medical/Clinical Reasons Except for: Meds    DVT Prophylaxis: Moderate risk.   No proph indicated  Zaman Catheter: Not present  Lines: None     Cardiac Monitoring: None  Code Status: No CPR- Do NOT Intubate      Clinically Significant Risk Factors                   # Hypertension: Noted on problem list     # Dementia: noted on problem list                   Disposition Plan     Medically Ready for Discharge: Anticipated in 2-4 Days         Discharge barrier(s): GIP consult  Care discussed with: family, RN, care mgr      Ilene De Los Santos MD  Hospitalist Service  [facility]  Securely message with Apparent (more info)  Text page via Med Access Paging/Directory   ______________________________________________________________________      Physical Exam   Vital Signs: Temp: 97.5  F (36.4  C) Temp src: Oral BP: 137/63 Pulse: 65   Resp: 22 SpO2: 97 % O2 Device: None (Room air)    Weight: 118 lbs 12.8 oz    General: elderly female sleeping with loud stertorous breathing and seems to be struggling with secretions  HEENT: Head normocephalic atraumatic, oral mucosa moist.  Skin: No rashes or lesions  Extremities: No peripheral edema  Psych: somnolent  Neuro: somnolent        Medical Decision Making               Data   Recent Results (from the past 16 hours)   Glucose by meter    Collection Time: 12/05/24  9:13 PM   Result Value Ref Range    GLUCOSE BY METER POCT 129 (H) 70 - 99 mg/dL   Platelet count    Collection Time: 12/06/24  6:59 AM   Result Value Ref Range    Platelet Count 146 (L) 150 - 450 10e3/uL   Glucose by meter    Collection Time: 12/06/24  9:52 AM   Result Value Ref Range    GLUCOSE BY METER POCT 132 (H) 70 - 99 mg/dL        Interval History     10:24 AM    Family meeting held with 3 dtrs and myself. Decision for comfort cares. See Care Conference note for details.    Dtrs will decide if they want to pursue community Hospice placement, or if we discontinue IVF now I don't think patient would survive long in which case GIP Hospice would be appropriate.    RN and Care mgr notified    9:06 AM    Patient is more uncomfortable today with pooling secretions.  Removed restriction for head of bed less than 30 degrees, we sat her up and she seemed immediately more comfortable.  Family meeting planned for 10 AM today.

## 2024-12-07 NOTE — PROGRESS NOTES
[facility]    Consult Note - [facility]  _________________________________________________________________    [facility] 24/7 Contact Number: [phone]    - Providers: Please contact [facility] with changes in orders or clinical plan of care   - Nursing: Please contact [facility] with significant changes in patient condition    Hospice will notify the care team (including the hospitalist) to confirm date of inpatient hospice (GIP) admission.    New Epic encounter will not be created until hospice completes admission.   ______________________________________________________________________        Hospice Diagnosis: Cerebral Infarction    Indication for Inpatient Hospice: Pain, Agitation, and Respiratory Secretions    Goals for Hospital Discharge: Pain managed AEB RR<20, Adult non-verbal pain score< 3/10 for 24 hrs in a 48 hr period, Agitation managed AEB no grabbing at sheet, pushing away staff during cares, attempting to get out of bed unassisted, or need for a sitter for 24 hours in a 48 hr period, Secretions managed AEB no audible gurgling and 3 or less PRN's for 24 hours in a 48 hr period.     Plan of Care Discussed with the Following:   - Nurse: Cierra RN  - Charge Nurse: Micky  - Hospitalist/Rounding Provider: Ilene De Los Santos MD  - Solveig's Family/Preferred Contact: Stephanie, Daughter  - Hospice Provider: Aditya Mccoy DO    Summary of Visit (includes assessment, medications and any new orders):   Patient is Disoriented x 4 and was enjoying some music with daughter Catalina, so eldest daughter, Stephanie and Hospice RN went to the chapel, that the Nurse Manager so graciously reserved for us, to complete the informational visit. Consents were signed and an assessment was completed. Anterior expiratory wheezes in bilateral upper lobes, patient pushes away additional attempts for lower lobes. Hypoactive bowel sounds in 4 quadrants, postive peripheral pulses. During  Hospice RN's time in the patient room, patient was attempting to get out of bed, putting her L leg over the rail. Hospice RN discussed floor RN and aide try to put patient into the chair to alleviate agitation.     After speaking to our hospice physician, Dr. Mccoy, recommendations are as follows:     Schedule:     hydromorphone 0.5 mg IV q 4 hr   lorazepam 1 mg IV q 6 hrs     PRN:     hydromorphone 0.5 mg IV q 1 hr for non-verbal signs of pain such as restlessness, facial grimace, or RR>20   lorazepam 1 mg IV q 4 hr for additional signs of agitation such as attempting to climb out of bed or pushing staff away   robinul 0.2 mg q 4 hr for audible gurgling    Hospice RN Lynne Adame will be the RN on this weekend.     Thank you for the referral!         Julian Singh RN

## 2024-12-07 NOTE — PLAN OF CARE
Goal Outcome Evaluation:  Slept all day. Turned and repositioned every 2 hours. Scheduled dilaudid given x 2 and scheduled Ativan given x 1. Pt Appears  calm and comfortable. Daughters in the room all day.     Cierra Ngo RN

## 2024-12-07 NOTE — PROGRESS NOTES
[facility]    Medicine Progress Note - Hospitalist Service    Date of Admission:  2024    Assessment & Plan                Mariela Ortiz is a 89 year old female with hypertension, hypothyroidism, low-grade CLL, baseline mild cognitive impairment with sundowning, here for evaluation of acute onset altered mental status and right facial droop found to have acute strokes in L MCA territory associated with a significant flow defect within the MCA. She became progressively more somnolent, unable to take p.o. meds safely and not able to participate in rehabilitation efforts.  She is now on Inpatient Hospice. Hospital Day: 2       Acute strokes L MCA territory  Severe flow defect within the proximal L MCA  -presented for slurred speech and AMS, developed R sided weakness  -severe dysphagia, unable to take PO.  -Significant somnolence, from stroke +/- delirium. Unable to participate in rehab efforts.  -transitioned to Inpatient Hospice on   -continue IV dilaudid and Ativan for symptoms, she seems very comfortable      Hypoglycemia  -Due to dysphagia and decreased alertness  -IV D5 discontinued   -no BG checks           Diet: NPO for Medical/Clinical Reasons Except for: Meds    DVT Prophylaxis: Moderate risk.   Proph contraindicated due to comfort cares  Zaman Catheter: Not present  Lines: None     Cardiac Monitoring: None  Code Status: No CPR- Do NOT Intubate      Clinically Significant Risk Factors Present on Admission                   # Hypertension: Noted on problem list     # Dementia: noted on problem list           # Financial/Environmental Concerns:           Disposition Plan     Medically Ready for Discharge: Anticipated in 2-4 Days         Discharge barrier(s): Inpatient Hospice. Expected to  here soon  Care discussed with: family      Ilene De Los Santos MD  Hospitalist Service  [facility]  Securely message with Vocera (more info)  Text page via  Room:  Identified pt with two pt identifiers(name and ). Reviewed record in preparation for visit and have obtained necessary documentation. Chief Complaint   Patient presents with    Medication Evaluation    Hypertension          Vitals:    23 1300   BP: (!) 148/95   Pulse: 61   Resp: 19   Temp: 98.2 °F (36.8 °C)   TempSrc: Oral   SpO2: 97%   Weight: 217 lb (98.4 kg)   Height: 5' 3\" (1.6 m)   PainSc:   0 - No pain   LMP: 2019       Health Maintenance Due   Topic    Colorectal Cancer Screening Combo     Flu Vaccine (1)    COVID-19 Vaccine (5 - Booster for Moderna series)       1. \"Have you been to the ER, urgent care clinic since your last visit? Hospitalized since your last visit? \" No    2. \"Have you seen or consulted any other health care providers outside of the [address] [place] [address] since your last visit? \" No     3. For patients over 45: Has the patient had a colonoscopy? No     If the patient is female:    4. For patients over 40: Has the patient had a mammogram? Yes - no Care Gap present    5. For patients over 21: Has the patient had a pap smear? NA - based on age    Current Outpatient Medications   Medication Instructions    amphetamine-dextroamphetamine XR (ADDERALL XR) 30 mg XR capsule Take 1 capsule by mouth every morning for 30 days.  Max daily amount: 30mg    buPROPion XL (WELLBUTRIN XL) 150 mg, Oral, 7AM    cholecalciferol, vitamin D3, 50 mcg (2,000 unit) tab Oral    dextroamphetamine-amphetamine (ADDERALL) 30 mg tablet 1 Tablet, Oral, 2 TIMES DAILY    escitalopram oxalate (LEXAPRO) 10 mg, Oral, DAILY    estradioL (ESTRACE) 1 mg, Oral, DAILY    eszopiclone (LUNESTA) 2 mg, Oral, EVERY BEDTIME    levothyroxine (SYNTHROID) 50 mcg, Oral, DAILY BEFORE BREAKFAST       Allergies   Allergen Reactions    Codeine Itching and Other (comments)     CHEST PAIN    Vicodin [Hydrocodone-Acetaminophen] Shortness of Breath, Itching and Other (comments)     CHEST PAIN    Percocet [facility] Paging/Directory   ______________________________________________________________________      Physical Exam   Vital Signs:                    Weight: 0 lbs 0 oz    General: elderly female sleeping with somewhat noisy but unlabored breathing  HEENT: Head normocephalic atraumatic, oral mucosa tacky.  Skin: No rashes or lesions  Extremities: No peripheral edema  Psych: somnolent  Neuro: somnolent      Medical Decision Making               Data   No results found for this or any previous visit (from the past 16 hours).    Interval History     Patient is very comfortable today.  Family are happy with her cares.  She is unresponsive at this point.  Her coloration appears good.  Family very much appreciated  visit yesterday.  Patient is on inpatient hospice.   [Oxycodone-Acetaminophen] Other (comments)     Chest pain    Percodan [Oxycodone-Aspirin] Other (comments)     Chest pain      Sulfa (Sulfonamide Antibiotics) Rash       Immunization History   Administered Date(s) Administered    COVID-19, MODERNA BLUE border, Primary or Immunocompromised, (age 18y+), IM, 100 mcg/0.5mL 03/15/2021, 04/20/2021    COVID-19, MODERNA Booster BLUE border, (age 18y+), IM, 50mcg/0.25mL 11/10/2021, 06/23/2022    Influenza, FLUARIX, FLULAVAL, FLUZONE (age 10 mo+) AND AFLURIA, (age 1 y+), PF, 0.5mL 10/29/2018, 09/17/2019, 10/26/2020, 10/18/2021    Tdap 06/24/2019    Zoster Recombinant 08/24/2020, 10/26/2020       Past Medical History:   Diagnosis Date    Ill-defined condition     ANXIETY    Iron deficiency anemia

## 2024-12-07 NOTE — PROGRESS NOTES
[facility]     Consult Note - [facility]  _________________________________________________________________     [facility] 24/7 Contact Number: [phone]    - Providers: Please contact [facility] with changes in orders or clinical plan of care   - Nursing: Please contact [facility] with significant changes in patient condition     Hospice will notify the care team (including the hospitalist) to confirm date of inpatient hospice (GIP) admission.    New Epic encounter will not be created until hospice completes admission.   ______________________________________________________________________         Hospice Diagnosis: Cerebral Infarction     Indication for Inpatient Hospice: Pain, Agitation, and Respiratory Secretions     Goals for Hospital Discharge: Pain managed AEB RR<20, Adult non-verbal pain score< 3/10 for 24 hrs in a 48 hr period, Agitation managed AEB no grabbing at sheet, pushing away staff during cares, attempting to get out of bed unassisted, or need for a sitter for 24 hours in a 48 hr period, Secretions managed AEB no audible gurgling and 3 or less PRN's for 24 hours in a 48 hr period.      Plan of Care Discussed with the Following:   - Nurse: Cierra RN  - Charge Nurse: Micky  - Hospitalist/Rounding Provider: Dr. Ilene De Los Santos MD  - Solveig's Family/Preferred Contact: Stephanie, Daughter  - Hospice Provider: Dr. Aditya Mccoy DO     Summary of Visit (includes assessment, medications and any new orders):   Saw Pt. This morning and spend time with patient's Daughter's Catalina and Stephanie.  Patient is now unresponsive and obtunded- which is a large shift from her cognitive status yesterday.  Per her daughters, she has not opened her eyes or been awake since last evening. Patient does appear comfortable during cares and after cares completed.  No signs of pain or agitation noted on assessment.    No changes to hospice medications today.  Please contiinue to  support patient with scheduled medications and utilize PRN's as needed.    Patient's Daughter's are wanting patient to be cremated and will decide on a  home in [place] to use.    Daughter's to confirm  Home today.     Given patient's significant decline overnight, expect patient to pass in hospice within coming days.      Thank you for taking such great care of this patient and family. This writer is available via [employer] for any further questions or concerns.      Lynne Adame, MSN, RN

## 2024-12-07 NOTE — PLAN OF CARE
Problem: Adult Inpatient Plan of Care  Goal: Optimal Comfort and Wellbeing  Outcome: Progressing  Intervention: Monitor Pain and Promote Comfort  Recent Flowsheet Documentation  Taken 12/7/2024 0119 by Luann Borjas RN  Pain Management Interventions:   medication (see MAR)   repositioned     Problem: End-of-Life Care  Goal: Comfort, Peace and Preserved Dignity  Outcome: Progressing   Goal Outcome Evaluation:    Pt somnolent. Does not arouse to voice, touch or cares. Incontinent of urine, brief in place. Appears comfortable- scheduled ativan and dilaudid given with good effect. Q 2 hr turn- did have ayan hugger on- pt sweaty so this was removed and turned off.     This RN made med error and gave 2 gtts of atropine prn at 0530 for increased congestion. Hospice discontinued this order yesterday. Updated nocturnist of error and filled out compass. Pt now at 0611 has improvement in congestion and continues to be somnolent, and appears comfortable.

## 2024-12-07 NOTE — CONSULTS
Care Management Follow Up    Length of Stay (days): 1    Expected Discharge Date: 12/08/2024    Anticipated Discharge Plan:  Other (Comments) (GIP hospice)    Transportation: TBD    PT Recommendations:    OT Recommendations:        Barriers to Discharge:  GIP hospice     Prior Living Situation:   with  daughter Judith     Discussed  Partnership in Safe Discharge Planning  document with patient/family: No     Handoff Completed: No, handoff not indicated or clinically appropriate    Patient/Spokesperson Updated: No    Additional Information:  Chart reviewed. Patient on GIP hospice . CM will follow for discharge planning needs     Next Steps: continue to follow     Jayla Hernandez RN

## 2024-12-08 NOTE — PLAN OF CARE
Goal Outcome Evaluation:              Patient has been sleeping,  appears  comfortable today.   Her coloration appears good.  Family was at bedside, then they left. Repositioning, mouth care, and check and change in bed done.

## 2024-12-08 NOTE — PROGRESS NOTES
[facility]    Medicine Progress Note - Hospitalist Service    Date of Admission:  2024    Assessment & Plan                Mariela Ortiz is a 89 year old female with hypertension, hypothyroidism, low-grade CLL, baseline mild cognitive impairment with sundowning, here for evaluation of acute onset altered mental status and right facial droop found to have acute strokes in L MCA territory associated with a significant flow defect within the MCA. She became progressively more somnolent, unable to take p.o. meds safely and not able to participate in rehabilitation efforts.  She is now on Inpatient Hospice. Hospital Day: 3       Acute strokes L MCA territory  Severe flow defect within the proximal L MCA  -presented for slurred speech and AMS, developed R sided weakness  -severe dysphagia, unable to take PO.  -Significant somnolence, from stroke +/- delirium. Unable to participate in rehab efforts.  -transitioned to Inpatient Hospice on   -continue IV dilaudid and Ativan for symptoms, she seems very comfortable      Hypoglycemia  -Due to dysphagia and decreased alertness  -IV D5 discontinued   -no BG checks           Diet: NPO for Medical/Clinical Reasons Except for: Meds    DVT Prophylaxis: Moderate risk.   Proph contraindicated due to comfort cares  Zaman Catheter: Not present  Lines: None     Cardiac Monitoring: None  Code Status: No CPR- Do NOT Intubate      Clinically Significant Risk Factors                   # Hypertension: Noted on problem list     # Dementia: noted on problem list            # Financial/Environmental Concerns:           Disposition Plan     Medically Ready for Discharge: Anticipated in 2-4 Days         Discharge barrier(s): Inpatient Hospice. Expected to  here soon  Care discussed with: family      Ilene De Los Santos MD  Hospitalist Service  [facility]  Securely message with [employer] (more info)  Text page via [employer] Paging/Directory    ______________________________________________________________________      Physical Exam   Vital Signs:                    Weight: 0 lbs 0 oz    General: elderly female sleeping with somewhat noisy but unlabored breathing  HEENT: Head normocephalic atraumatic, oral mucosa tacky.  Skin: No rashes or lesions  Extremities: No peripheral edema  Psych: somnolent  Neuro: somnolent      Medical Decision Making               Data   No results found for this or any previous visit (from the past 16 hours).    Interval History     Patient continues to be very comfortable.  She is sleeping deeply, does not arouse any longer.  Family members happy with cares.  Continue current doses of medications.

## 2024-12-08 NOTE — PLAN OF CARE
Problem: End-of-Life Care  Goal: Comfort, Peace and Preserved Dignity  Outcome: Progressing   Goal Outcome Evaluation:  Slept all day. Scheduled IV ativan and Dilaudid given. Turned and repositioned every 2 hours. Hair shampooed with shower cup. Pt appears comfortable. Family in the room all day, supportive of pt.     Cierra Ngo RN

## 2024-12-08 NOTE — PROGRESS NOTES
[facility]     Consult Note - [facility]patient [facility]  _________________________________________________________________     [facility]  Contact Number: [phone]    - Providers: Please contact [facility] with changes in orders or clinical plan of care   - Nursing: Please contact [facility] with significant changes in patient condition     Hospice will notify the care team (including the hospitalist) to confirm date of inpatient hospice (GIP) admission.    New Epic encounter will not be created until hospice completes admission.   ______________________________________________________________________         Hospice Diagnosis: Cerebral Infarction     Indication for Inpatient Hospice: Pain, Agitation, and Respiratory Secretions     Goals for Hospital Discharge: Pain managed AEB RR<20, Adult non-verbal pain score< 3/10 for 24 hrs in a 48 hr period, Agitation managed AEB no grabbing at sheet, pushing away staff during cares, attempting to get out of bed unassisted, or need for a sitter for 24 hours in a 48 hr period, Secretions managed AEB no audible gurgling and 3 or less PRN's for 24 hours in a 48 hr period.      Plan of Care Discussed with the Following:   - Nurse: Cierra RN  - Charge Nurse: Micky  - Hospitalist/Rounding Provider: Dr. Ilene De Los Santos MD  - Solveig's Family/Preferred Contact: Ginger, Daughter  - Hospice Provider: Dr. Aditya Mccoy,      Summary of Visit (includes assessment, medications and any new orders):   Saw Pt. This morning and spend time with patient's Daughter's Catalina and Ginger. Patient continues to appear comfortable and is tolerating cares with current pain and agitation medications on board.    No recommendations from hospice team this visit.  Please continue to support patient with scheduled and PRN medications as needed.    Patient's daughter's confirmed that they will go with [facility]  Home:  [address] [place] [address]  [phone]    Thank you for taking such great care of this patient and family!      Lynne Adame, MSN, RN

## 2024-12-09 NOTE — PROGRESS NOTES
[facility]    Medicine Progress Note - Hospitalist Service    Date of Admission:  2024    Assessment & Plan                Mariela Ortiz is a 89 year old female with hypertension, hypothyroidism, low-grade CLL, baseline mild cognitive impairment with sundowning, here for evaluation of acute onset altered mental status and right facial droop found to have acute strokes in L MCA territory associated with a significant flow defect within the MCA. She became progressively more somnolent, unable to take p.o. meds safely and not able to participate in rehabilitation efforts.  She is now on Inpatient Hospice. Hospital Day: 4       Acute strokes L MCA territory  Severe flow defect within the proximal L MCA  -presented for slurred speech and AMS, developed R sided weakness  -severe dysphagia, unable to take PO.  -Significant somnolence, from stroke +/- delirium. Unable to participate in rehab efforts.  -transitioned to Inpatient Hospice on   -continue IV dilaudid and Ativan for symptoms, she seems very comfortable      Hypoglycemia  -Due to dysphagia and decreased alertness  -IV D5 discontinued   -no BG checks           Diet: NPO for Medical/Clinical Reasons Except for: Meds    DVT Prophylaxis: Moderate risk.   Proph contraindicated due to comfort cares  Zaman Catheter: Not present  Lines: None     Cardiac Monitoring: None  Code Status: No CPR- Do NOT Intubate      Clinically Significant Risk Factors                   # Hypertension: Noted on problem list     # Dementia: noted on problem list            # Financial/Environmental Concerns:           Disposition Plan     Medically Ready for Discharge: Anticipated in 2-4 Days         Discharge barrier(s): Inpatient Hospice. Expected to  here soon  Care discussed with: family      Ilene De Los Santos MD  Hospitalist Service  [facility]  Securely message with [employer] (more info)  Text page via [employer] Paging/Directory    ______________________________________________________________________      Physical Exam   Vital Signs:                    Weight: 0 lbs 0 oz    General: elderly female sleeping with somewhat noisy but unlabored breathing. Lying with neck more extended today.  HEENT: Head normocephalic atraumatic, oral mucosa tacky.  Skin: No rashes or lesions  Extremities: No peripheral edema  Psych: somnolent  Neuro: somnolent      Medical Decision Making               Data   No results found for this or any previous visit (from the past 16 hours).    Interval History     Patient remains very comfortable.  She seems to be resting with her neck more stented today, type of posturing typically seen closer to end-of-life.  Family members are getting a little bit tired with duration that things are taking.  Expressed sympathy.  Encouraged them to ask for visiting hours exception if they are worried about their mom.  Continue present cares.

## 2024-12-09 NOTE — PROGRESS NOTES
"[facility]    Consult Note - [facility] Inpatient [facility]osp[facility]  _________________________________________________________________    [facility] 24/7 Contact Number: [phone]    - Providers: Please contact [facility] with changes in orders or clinical plan of care   - Nursing: Please contact [facility] with significant changes in patient condition    Hospice will notify the care team (including the hospitalist) to confirm date of inpatient hospice (GIP) admission.    New Epic encounter will not be created until hospice completes admission.   ______________________________________________________________________        Hospice Diagnosis: Cerebral Infarction    Indication for Inpatient Hospice: Pain, Agitation, & Respiratory Secretions    Goals for Hospital Discharge: Pain managed AEB RR<20, Adult non-verbal pain score< 3/10, and no signs of pain in phases of care for 24 hrs in a 48 hr period, Agitation managed AEB no grabbing at sheet, pushing away staff during cares, attempting to get out of bed unassisted, or need for a sitter for 24 hours in a 48 hr period, Secretions managed AEB no audible gurgling and 3 or less PRN's for 24 hours in a 48 hr period.     Plan of Care Discussed with the Following:   - Nurse: GRACIA Interiano  - Hospitalist/Rounding Provider: Ilene De Los Santos MD  - Solveig's Family/Preferred Contact: DaughterStephanie  - Hospice Provider: Aditya Mccoy DO    Summary of Visit (includes assessment, medications and any new orders):     Patient was resting in bed when Hospice RN came into room to assess. Daughters Stephanie and Catalina were also present and discussed how patient seems to stir more when it's getting time for medication or after repositioning, This RN discussed with floor nurse pre-medicating with PRN's prior to repositioning so patient will be comfortable in all phases of care. Family reports patient's breathing was \"different\" last night, although no signs of apneic " episodes. Today patient has shallow, open-mouthed breathing and looks very cachectic. Patient seems fairly imminent and will probably pass here in the next couple of days. Hospice RN answered both daughter's questions and assisted in repositioning patient. Will check PRN's used over the next 24 hours to see if patient needs a change in orders or change to routine care.     Updated DIANE Uribe and will do so daily while patient in under GIP level of care.      Julian Singh, RN

## 2024-12-09 NOTE — PLAN OF CARE
Goal Outcome Evaluation:                      Patient has been lying quietly in bed,  appears  comfortable today.  Pt moans occasionally.  Labored breathing noted. Her coloration appears good.  Family was at bedside, then they left. Repositioning, mouth care, and check and change in bed done.

## 2024-12-09 NOTE — PROGRESS NOTES
Daughters Stephanie and Catalina wanted to update [facility] hospice team and care team of the  home they would prefer pt goes to.     [facility]   [address]   [place] [address]  Phone #[phone]      RNCM updated [facility] hospice team.      Margaret Borjas RN on 2024 at 3:39 PM

## 2024-12-09 NOTE — PROGRESS NOTES
Patient has been lying quietly in bed,  appears  comfortable today.  Pt moans occasionally.  Labored breathing noted. Her coloration appears good.  Family was at bedside, then they left. Repositioning, mouth care, and check and change in bed done.

## 2024-12-09 NOTE — PLAN OF CARE
Goal Outcome Evaluation:            Problem: End-of-Life Care  Goal: Comfort, Peace and Preserved Dignity  Outcome: Progressing

## 2024-12-10 NOTE — PROGRESS NOTES
Called to pt's room by daughter, Catalina Reilly. Pt had appeared to have passed. Listened to pt's lungs, no breath sounds. Checked heart/pulse, no activity detected. Notified resident.    Resident to bedside, time of death 225. Daughter did not wish to stay longer, no belongings except pt's tshirt which daughter did not request back. Confirmed preferred  home with daughter. Will notify for collection. Chg RN updated. Notified on-call hospitalist.    0320- Notified  home, director states they will not be able to collect pt until the morning. Pt to be moved to the [facility] in the interim.    0350- Transferred pt to temporary holding with security assistance.

## 2024-12-10 NOTE — DISCHARGE SUMMARY
Death / Discharge Summary    Admit date: 2024  Admission Diagnoses: Hospice  Hospice care   Patient YOB: 1933     Date and time of death: 12/10/2024 2:25AM    Reason for Admission: stroke     Hospital Summary: Mariela Ortiz was a 89 year old female admitted on 12/3/2024 for altered mental status and slurred speech found to have strokes associated with a severe stenosis of L MCA. She developed R sided weakness, severe dysphagia and somnolence. She was not able to participate in rehab efforts. Decision was made for comfort cares. Patient transitioned to inpatient hospice on . She  with family at her side. May she rest in peace.    Events Leading to Death:     Cause(s) of death: Approximate interval for each:   hypoglycemia 5 days   somnolence 7 days   Acute stroke 8 days             Other significant diagnoses that contributed to death:   Left middle cerebral artery stenosis  Essential hypertension  Mild cognitive impairment  Hyperlipidemia  Did tobacco use contribute to death? Probably  Did injury or trauma contribute to death? No  If female, was patient pregnant at time of death? No  Manner of death: Natural     Consultants: neurology, Hospice  Surgical Procedures Performed: none    Autopsy: Not requested    Attending Physician: Ilene De Los Santos MD    Primary Care Physician: [facility] [place] [facility]    Discharge Physician: Ilene De Los Santos MD, [facility] Medicine [facility]    Time spent <30 minutes

## 2024-12-10 NOTE — PLAN OF CARE
Goal Outcome Evaluation:       Pt. Turned and repositioned every two hours and as needed. Appears to be comfortable and sleeping. Valerie care done with diaper changes. Oral cares done with repositioning. Monitor IV patency. Oral medication is available. Family cared for as well.

## 2024-12-10 NOTE — SIGNIFICANT EVENT
Significant Event Note    House officer called to pronounce Mariela Ortiz dead. Patient unresponsive to verbal and tactile stimuli.  No heart sounds heard, no pulse felt. No spontaneous respirations.  Pupils fixed and dilated. Patient pronounced dead at 2:25 AM on 12/10/2024. Nursing staff to notify family and primary doctor.      The resident team was not the primary team for this patient    Matt Robert MD  [facility]           no
